# Patient Record
Sex: MALE | Race: WHITE | NOT HISPANIC OR LATINO | Employment: OTHER | URBAN - METROPOLITAN AREA
[De-identification: names, ages, dates, MRNs, and addresses within clinical notes are randomized per-mention and may not be internally consistent; named-entity substitution may affect disease eponyms.]

---

## 2017-04-03 DIAGNOSIS — F41.9 ANXIETY: ICD-10-CM

## 2017-04-03 RX ORDER — ALPRAZOLAM 0.5 MG/1
TABLET ORAL
Qty: 30 TABLET | Refills: 3 | Status: SHIPPED | OUTPATIENT
Start: 2017-04-03 | End: 2017-08-11 | Stop reason: SDUPTHER

## 2017-08-11 DIAGNOSIS — F41.9 ANXIETY: ICD-10-CM

## 2017-08-11 DIAGNOSIS — F33.1 MAJOR DEPRESSIVE DISORDER, RECURRENT, MODERATE: ICD-10-CM

## 2017-08-11 RX ORDER — ALPRAZOLAM 0.5 MG/1
0.5 TABLET ORAL NIGHTLY PRN
Qty: 30 TABLET | Refills: 5 | Status: SHIPPED | OUTPATIENT
Start: 2017-08-11 | End: 2018-03-10 | Stop reason: SDUPTHER

## 2017-08-11 RX ORDER — SERTRALINE HYDROCHLORIDE 100 MG/1
TABLET, FILM COATED ORAL
Qty: 90 TABLET | Refills: 3 | Status: SHIPPED | OUTPATIENT
Start: 2017-08-11 | End: 2018-02-23 | Stop reason: SDUPTHER

## 2017-12-28 RX ORDER — ATORVASTATIN CALCIUM 40 MG/1
TABLET, FILM COATED ORAL
Qty: 90 TABLET | Refills: 3 | Status: SHIPPED | OUTPATIENT
Start: 2017-12-28 | End: 2018-02-23 | Stop reason: SDUPTHER

## 2018-01-10 ENCOUNTER — OFFICE VISIT (OUTPATIENT)
Dept: FAMILY MEDICINE | Facility: CLINIC | Age: 63
End: 2018-01-10
Payer: COMMERCIAL

## 2018-01-10 VITALS
HEIGHT: 71 IN | TEMPERATURE: 98 F | DIASTOLIC BLOOD PRESSURE: 72 MMHG | SYSTOLIC BLOOD PRESSURE: 130 MMHG | OXYGEN SATURATION: 97 % | BODY MASS INDEX: 31.42 KG/M2 | HEART RATE: 73 BPM | WEIGHT: 224.44 LBS

## 2018-01-10 DIAGNOSIS — R73.03 PREDIABETES: Primary | ICD-10-CM

## 2018-01-10 DIAGNOSIS — G47.33 OSA ON CPAP: ICD-10-CM

## 2018-01-10 DIAGNOSIS — Z71.6 ENCOUNTER FOR SMOKING CESSATION COUNSELING: ICD-10-CM

## 2018-01-10 DIAGNOSIS — Z23 NEED FOR IMMUNIZATION AGAINST INFLUENZA: ICD-10-CM

## 2018-01-10 DIAGNOSIS — F33.1 MAJOR DEPRESSIVE DISORDER, RECURRENT, MODERATE: ICD-10-CM

## 2018-01-10 DIAGNOSIS — Z79.899 MEDICATION MANAGEMENT: ICD-10-CM

## 2018-01-10 DIAGNOSIS — F41.9 ANXIETY: ICD-10-CM

## 2018-01-10 DIAGNOSIS — N52.9 VASCULOGENIC ERECTILE DYSFUNCTION, UNSPECIFIED VASCULOGENIC ERECTILE DYSFUNCTION TYPE: ICD-10-CM

## 2018-01-10 DIAGNOSIS — Z12.5 ENCOUNTER FOR SCREENING FOR MALIGNANT NEOPLASM OF PROSTATE: ICD-10-CM

## 2018-01-10 DIAGNOSIS — E78.2 COMBINED HYPERLIPIDEMIA: ICD-10-CM

## 2018-01-10 DIAGNOSIS — Z23 NEED FOR SHINGLES VACCINE: ICD-10-CM

## 2018-01-10 DIAGNOSIS — E66.9 OBESITY (BMI 30.0-34.9): ICD-10-CM

## 2018-01-10 DIAGNOSIS — Z79.82 LONG-TERM USE OF ASPIRIN THERAPY: ICD-10-CM

## 2018-01-10 PROCEDURE — 99213 OFFICE O/P EST LOW 20 MIN: CPT | Mod: S$GLB,,, | Performed by: FAMILY MEDICINE

## 2018-01-10 PROCEDURE — 99999 PR PBB SHADOW E&M-EST. PATIENT-LVL V: CPT | Mod: PBBFAC,,, | Performed by: FAMILY MEDICINE

## 2018-01-10 RX ORDER — SILDENAFIL 100 MG/1
100 TABLET, FILM COATED ORAL DAILY PRN
Qty: 10 TABLET | Refills: 11 | Status: SHIPPED | OUTPATIENT
Start: 2018-01-10 | End: 2020-02-21

## 2018-01-10 NOTE — PATIENT INSTRUCTIONS
Consider options for smoking cessation and will discuss at upcoming annual-- chantix, vs E-cig. Re-address colonoscopy order at upcoming visit.  Obtain flu shot and shingles shot have pharmacy as advised.  Follow up for eye exam with Dr. Mcmahon per referral.

## 2018-01-10 NOTE — PROGRESS NOTES
Office Visit    Patient Name: Stef Pool    : 1955  MRN: [id number]    Subjective:  Stef is a 62 y.o. male who presents today for:    Medication Management (wants to discuss medications and get lab work done on friday)    Stef presents today for medication management and monitoring of chronic conditions including obesity, prediabetes, hyperlipidemia, tobacco abuse, anxiety and depression.  He was originally scheduled today for an annual wellness visit, but he had multiple questions regarding his medications that he wanted to address and stated that he would prefer to have labs done prior to his annual exam so we can go over them.    He has been feeling over all well. General mood outlook is good-- helped with daily zoloft. Anxiety is managed with once daily xanax.      Diet: has decreased fried foods, soft drinks, coffee      Exercise: active at work and walking pretty regularly-- feels well with exertion.      Sleep: good-- compliant with CPAP nightly     Weight is overall stable.     Smoking is poor-- he is smoking about 1 pack per day.  He was previously successful with smoking cessation on Chantix.  He has also had benefit from E cigarettes with cutting back.    Screening tests: colonoscopy (2012) up to date and next in 2017--overdue and ordered, due for PSA, hep C neg 2015     Immunizations: TDaP 2013, due for yearly FLU shot & shingles vaccine through pharmacy today    Eye/Dental: due for eye-- requests referral.          Past Medical History  Past Medical History:   Diagnosis Date    Anxiety 2015    Combined hyperlipidemia 2012    HEARING LOSS     Major depressive disorder, recurrent, moderate 2012    Obesity (BMI 30.0-34.9) 2015    diet and exercise changes discussed     BARAK on CPAP 2015    uses at least 4 hours nightly     Smoker 2013       Past Surgical History  No past surgical history on file.    Family History  Family History   Problem  "Relation Age of Onset    Heart disease Mother        Social History  Social History     Social History    Marital status:      Spouse name: N/A    Number of children: N/A    Years of education: N/A     Occupational History    Not on file.     Social History Main Topics    Smoking status: Current Every Day Smoker    Smokeless tobacco: Not on file    Alcohol use Yes    Drug use: No    Sexual activity: Not on file     Other Topics Concern    Not on file     Social History Narrative    No narrative on file       Current Medications  Medications reviewed and updated.     Allergies   Review of patient's allergies indicates:  No Known Allergies    Review of Systems (Pertinent positives)  Review of Systems   Constitutional: Negative for unexpected weight change.   Cardiovascular: Negative for chest pain and leg swelling.   Genitourinary:        Erectile dysfunction   Psychiatric/Behavioral: Positive for dysphoric mood. Negative for sleep disturbance. The patient is nervous/anxious.        /72 (BP Location: Left arm, Patient Position: Sitting)   Pulse 73   Temp 97.7 °F (36.5 °C) (Oral)   Ht 5' 11" (1.803 m)   Wt 101.8 kg (224 lb 6.9 oz)   SpO2 97%   BMI 31.30 kg/m²     Physical Exam   Constitutional: He is oriented to person, place, and time. He appears well-developed and well-nourished. No distress.   HENT:   Head: Normocephalic and atraumatic.   Cardiovascular: Normal rate, regular rhythm and normal heart sounds.    Pulmonary/Chest: Effort normal and breath sounds normal.   Musculoskeletal: He exhibits no edema.   Neurological: He is alert and oriented to person, place, and time.   Psychiatric: He has a normal mood and affect.   Vitals reviewed.        Assessment/Plan:  Stef Pool is a 62 y.o. male who presents today for :    Stef was seen today for medication management.    Diagnoses and all orders for this visit:    Prediabetes  -     Hemoglobin A1c; Future  -     Comprehensive " metabolic panel; Future  -     Lipid panel; Future  -     Ambulatory referral to Optometry    Obesity (BMI 30.0-34.9)  -     Hemoglobin A1c; Future  -     Comprehensive metabolic panel; Future  -     Lipid panel; Future  -     TSH; Future    BARAK on CPAP    Medication management  -     Hemoglobin A1c; Future  -     Comprehensive metabolic panel; Future  -     Lipid panel; Future  -     PSA, Screening; Future  -     CBC auto differential; Future  -     TSH; Future    Anxiety  -     TSH; Future    Major depressive disorder, recurrent, moderate  -     TSH; Future    Combined hyperlipidemia  -     Comprehensive metabolic panel; Future  -     Lipid panel; Future  -     CBC auto differential; Future  -     TSH; Future    Long-term use of aspirin therapy    Need for shingles vaccine    Need for immunization against influenza    Encounter for screening for malignant neoplasm of prostate  -     PSA, Screening; Future    Encounter for smoking cessation counseling    Vasculogenic erectile dysfunction, unspecified vasculogenic erectile dysfunction type  -     sildenafil (VIAGRA) 100 MG tablet; Take 1 tablet (100 mg total) by mouth daily as needed for Erectile Dysfunction.    Other orders  -     Cancel: Case request GI: COLONOSCOPY            ICD-10-CM ICD-9-CM    1. Prediabetes R73.03 790.29 Hemoglobin A1c      Comprehensive metabolic panel      Lipid panel      Ambulatory referral to Optometry   2. Obesity (BMI 30.0-34.9) E66.9 278.00 Hemoglobin A1c      Comprehensive metabolic panel      Lipid panel      TSH   3. BARAK on CPAP G47.33 327.23     Z99.89 V46.8    4. Medication management Z79.899 V58.69 Hemoglobin A1c      Comprehensive metabolic panel      Lipid panel      PSA, Screening      CBC auto differential      TSH   5. Anxiety F41.9 300.00 TSH   6. Major depressive disorder, recurrent, moderate F33.1 296.32 TSH   7. Combined hyperlipidemia E78.2 272.2 Comprehensive metabolic panel      Lipid panel      CBC auto  differential      TSH   8. Long-term use of aspirin therapy Z79.82 V58.66    9. Need for shingles vaccine Z23 V04.89    10. Need for immunization against influenza Z23 V04.81    11. Encounter for screening for malignant neoplasm of prostate Z12.5 V76.44 PSA, Screening   12. Encounter for smoking cessation counseling Z71.6 V65.42     Z72.0 305.1    13. Vasculogenic erectile dysfunction, unspecified vasculogenic erectile dysfunction type N52.9 607.84 sildenafil (VIAGRA) 100 MG tablet       Patient Instructions   Consider options for smoking cessation and will discuss at upcoming annual-- chantix, vs E-cig. Re-address colonoscopy order at upcoming visit.  Obtain flu shot and shingles shot have pharmacy as advised.  Follow up for eye exam with Dr. Mcmahon per referral.            Return for have labs as scheduled and then return for annual exam.

## 2018-01-12 ENCOUNTER — LAB VISIT (OUTPATIENT)
Dept: LAB | Facility: HOSPITAL | Age: 63
End: 2018-01-12
Attending: FAMILY MEDICINE
Payer: COMMERCIAL

## 2018-01-12 DIAGNOSIS — E78.2 COMBINED HYPERLIPIDEMIA: ICD-10-CM

## 2018-01-12 DIAGNOSIS — R73.03 PREDIABETES: ICD-10-CM

## 2018-01-12 DIAGNOSIS — F33.1 MAJOR DEPRESSIVE DISORDER, RECURRENT, MODERATE: ICD-10-CM

## 2018-01-12 DIAGNOSIS — Z79.899 MEDICATION MANAGEMENT: ICD-10-CM

## 2018-01-12 DIAGNOSIS — E66.9 OBESITY (BMI 30.0-34.9): ICD-10-CM

## 2018-01-12 DIAGNOSIS — F41.9 ANXIETY: ICD-10-CM

## 2018-01-12 DIAGNOSIS — Z12.5 ENCOUNTER FOR SCREENING FOR MALIGNANT NEOPLASM OF PROSTATE: ICD-10-CM

## 2018-01-12 LAB
ALBUMIN SERPL BCP-MCNC: 3.6 G/DL
ALP SERPL-CCNC: 113 U/L
ALT SERPL W/O P-5'-P-CCNC: 21 U/L
ANION GAP SERPL CALC-SCNC: 7 MMOL/L
AST SERPL-CCNC: 18 U/L
BASOPHILS # BLD AUTO: 0.02 K/UL
BASOPHILS NFR BLD: 0.3 %
BILIRUB SERPL-MCNC: 0.5 MG/DL
BUN SERPL-MCNC: 13 MG/DL
CALCIUM SERPL-MCNC: 9.5 MG/DL
CHLORIDE SERPL-SCNC: 106 MMOL/L
CHOLEST SERPL-MCNC: 156 MG/DL
CHOLEST/HDLC SERPL: 5.4 {RATIO}
CO2 SERPL-SCNC: 28 MMOL/L
COMPLEXED PSA SERPL-MCNC: 1.6 NG/ML
CREAT SERPL-MCNC: 1 MG/DL
DIFFERENTIAL METHOD: NORMAL
EOSINOPHIL # BLD AUTO: 0.3 K/UL
EOSINOPHIL NFR BLD: 4.3 %
ERYTHROCYTE [DISTWIDTH] IN BLOOD BY AUTOMATED COUNT: 14 %
EST. GFR  (AFRICAN AMERICAN): >60 ML/MIN/1.73 M^2
EST. GFR  (NON AFRICAN AMERICAN): >60 ML/MIN/1.73 M^2
ESTIMATED AVG GLUCOSE: 111 MG/DL
GLUCOSE SERPL-MCNC: 91 MG/DL
HBA1C MFR BLD HPLC: 5.5 %
HCT VFR BLD AUTO: 49.4 %
HDLC SERPL-MCNC: 29 MG/DL
HDLC SERPL: 18.6 %
HGB BLD-MCNC: 16.2 G/DL
LDLC SERPL CALC-MCNC: 89 MG/DL
LYMPHOCYTES # BLD AUTO: 1.7 K/UL
LYMPHOCYTES NFR BLD: 22.6 %
MCH RBC QN AUTO: 29.4 PG
MCHC RBC AUTO-ENTMCNC: 32.8 G/DL
MCV RBC AUTO: 90 FL
MONOCYTES # BLD AUTO: 0.8 K/UL
MONOCYTES NFR BLD: 10.3 %
NEUTROPHILS # BLD AUTO: 4.7 K/UL
NEUTROPHILS NFR BLD: 62.2 %
NONHDLC SERPL-MCNC: 127 MG/DL
PLATELET # BLD AUTO: 160 K/UL
PMV BLD AUTO: 11.1 FL
POTASSIUM SERPL-SCNC: 4.4 MMOL/L
PROT SERPL-MCNC: 6.7 G/DL
RBC # BLD AUTO: 5.51 M/UL
SODIUM SERPL-SCNC: 141 MMOL/L
TRIGL SERPL-MCNC: 190 MG/DL
TSH SERPL DL<=0.005 MIU/L-ACNC: 1.51 UIU/ML
WBC # BLD AUTO: 7.6 K/UL

## 2018-01-12 PROCEDURE — 84443 ASSAY THYROID STIM HORMONE: CPT

## 2018-01-12 PROCEDURE — 84153 ASSAY OF PSA TOTAL: CPT

## 2018-01-12 PROCEDURE — 83036 HEMOGLOBIN GLYCOSYLATED A1C: CPT

## 2018-01-12 PROCEDURE — 36415 COLL VENOUS BLD VENIPUNCTURE: CPT

## 2018-01-12 PROCEDURE — 80053 COMPREHEN METABOLIC PANEL: CPT

## 2018-01-12 PROCEDURE — 85025 COMPLETE CBC W/AUTO DIFF WBC: CPT

## 2018-01-12 PROCEDURE — 80061 LIPID PANEL: CPT

## 2018-01-25 ENCOUNTER — OFFICE VISIT (OUTPATIENT)
Dept: DERMATOLOGY | Facility: CLINIC | Age: 63
End: 2018-01-25
Payer: COMMERCIAL

## 2018-01-25 DIAGNOSIS — L72.0 EIC (EPIDERMAL INCLUSION CYST): ICD-10-CM

## 2018-01-25 DIAGNOSIS — Z12.83 SCREENING EXAM FOR SKIN CANCER: ICD-10-CM

## 2018-01-25 DIAGNOSIS — L81.4 LENTIGO: ICD-10-CM

## 2018-01-25 DIAGNOSIS — L91.8 SKIN TAG: ICD-10-CM

## 2018-01-25 DIAGNOSIS — D22.9 BENIGN NEVUS: ICD-10-CM

## 2018-01-25 DIAGNOSIS — L82.0 BENIGN LICHENOID KERATOSIS: Primary | ICD-10-CM

## 2018-01-25 PROCEDURE — 17110 DESTRUCTION B9 LES UP TO 14: CPT | Mod: S$GLB,,, | Performed by: DERMATOLOGY

## 2018-01-25 PROCEDURE — 99202 OFFICE O/P NEW SF 15 MIN: CPT | Mod: 25,S$GLB,, | Performed by: DERMATOLOGY

## 2018-01-25 PROCEDURE — 99999 PR PBB SHADOW E&M-EST. PATIENT-LVL II: CPT | Mod: PBBFAC,,, | Performed by: DERMATOLOGY

## 2018-01-25 NOTE — PATIENT INSTRUCTIONS

## 2018-01-25 NOTE — PROGRESS NOTES
Subjective:       Patient ID:  Stef Pool is a 62 y.o. male who presents for   Chief Complaint   Patient presents with    Spot     left chin x 12- years, asymptomatic no prev tx     Skin Check     UBSE     Patient presents as new patient for UBSE.   Lesions of concern: pink spot on the left chin, present 1-2 years, asymptomatic. No prior tx.   No personal or family history of skin cancer.        Review of Systems   Constitutional: Negative for fever and chills.   Respiratory:        Smokes 1ppd   Skin: Positive for wears hat. Negative for daily sunscreen use, activity-related sunscreen use and recent sunburn.   Hematologic/Lymphatic: Does not bruise/bleed easily.        Objective:    Physical Exam   Constitutional: He appears well-developed and well-nourished. No distress.   Neurological: He is alert and oriented to person, place, and time. He is not disoriented.   Psychiatric: He has a normal mood and affect.   Skin:   Areas Examined (abnormalities noted in diagram):   Head / Face Inspection Performed  Neck Inspection Performed  Chest / Axilla Inspection Performed  Back Inspection Performed  RUE Inspected  LUE Inspection Performed                   Diagram Legend     Erythematous scaling macule/papule c/w actinic keratosis       Vascular papule c/w angioma      Pigmented verrucoid papule/plaque c/w seborrheic keratosis      Yellow umbilicated papule c/w sebaceous hyperplasia      Irregularly shaped tan macule c/w lentigo     1-2 mm smooth white papules consistent with Milia      Movable subcutaneous cyst with punctum c/w epidermal inclusion cyst      Subcutaneous movable cyst c/w pilar cyst      Firm pink to brown papule c/w dermatofibroma      Pedunculated fleshy papule(s) c/w skin tag(s)      Evenly pigmented macule c/w junctional nevus     Mildly variegated pigmented, slightly irregular-bordered macule c/w mildly atypical nevus      Flesh colored to evenly pigmented papule c/w intradermal nevus        Pink pearly papule/plaque c/w basal cell carcinoma      Erythematous hyperkeratotic cursted plaque c/w SCC      Surgical scar with no sign of skin cancer recurrence      Open and closed comedones      Inflammatory papules and pustules      Verrucoid papule consistent consistent with wart     Erythematous eczematous patches and plaques     Dystrophic onycholytic nail with subungual debris c/w onychomycosis     Umbilicated papule    Erythematous-base heme-crusted tan verrucoid plaque consistent with inflamed seborrheic keratosis     Erythematous Silvery Scaling Plaque c/w Psoriasis     See annotation      Assessment / Plan:        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 Liquid nitrogen cryosurgery is applied to 1 lesions to produce a freeze injury. The patient is aware that blisters may form and is instructed on wound care with gentle cleansing and use of vaseline ointment to keep moist until healed. The patient is supplied a handout on cryosurgery and is instructed to call if lesions do not completely resolve.      Lentigo  This is a benign hyperpigmented sun induced lesion. Daily sun protection will reduce the number of new lesions. Treatment of these benign lesions are considered cosmetic.      Skin tags  Reassurance given to patient. No treatment is necessary.   Pt to see ophtho tomorrow for removal of irritating lesions left lower and upper eyelid    Benign nevus  Reassurance given to patient. No treatment is necessary.       EIC (epidermal inclusion cyst)  Reassurance given to patient. No treatment is necessary.       Screening exam for skin cancer    Upper body skin examination performed today including at least 6 points as noted in physical examination. No lesions suspicious for malignancy noted.               Follow-up if symptoms worsen or fail to improve.

## 2018-01-26 ENCOUNTER — OFFICE VISIT (OUTPATIENT)
Dept: OPTOMETRY | Facility: CLINIC | Age: 63
End: 2018-01-26
Payer: COMMERCIAL

## 2018-01-26 DIAGNOSIS — H25.13 NUCLEAR SCLEROSIS OF BOTH EYES: Primary | ICD-10-CM

## 2018-01-26 DIAGNOSIS — H52.4 BILATERAL PRESBYOPIA: ICD-10-CM

## 2018-01-26 DIAGNOSIS — H02.9 EYELID LESION: ICD-10-CM

## 2018-01-26 PROCEDURE — 92004 COMPRE OPH EXAM NEW PT 1/>: CPT | Mod: S$GLB,,, | Performed by: OPTOMETRIST

## 2018-01-26 PROCEDURE — 99999 PR PBB SHADOW E&M-EST. PATIENT-LVL II: CPT | Mod: PBBFAC,,, | Performed by: OPTOMETRIST

## 2018-01-26 NOTE — LETTER
January 26, 2018      Caitlyn Fong MD  [address]  [address]  [place] [address]           [place] [facility]  [address] [facility]  [place] [address]  Phone: [phone]  Fax: [phone]          Patient: Stef Pool   MR Number: [id number]   YOB: 1955   Date of Visit: 1/26/2018       Dear Dr. Caitlyn Fong:    Thank you for referring Stef Pool to me for evaluation. Attached you will find relevant portions of my assessment and plan of care.    If you have questions, please do not hesitate to call me. I look forward to following Stef Pool along with you.    Sincerely,    Don Mcmahon, OD    Enclosure  CC:  No Recipients    If you would like to receive this communication electronically, please contact [email or web address] or [phone] to request more information on [employer] access.    For providers and/or their staff who would like to refer a patient to [facility], please contact us through our one-stop-shop provider referral line, [facility] , at [phone].    If you feel you have received this communication in error or would no longer like to receive these types of communications, please e-mail [email or web address]

## 2018-01-26 NOTE — PROGRESS NOTES
HPI     Using OTC readers with good results  Wants eyelid lesion removed from LLL  Found out he was not diabetic    Last edited by Don Mcmahon, OD on 1/26/2018 10:57 AM. (History)            Assessment /Plan     For exam results, see Encounter Report.    Nuclear sclerosis of both eyes    Eyelid lesion  -     Ambulatory Referral to Ophthalmology    Bilateral presbyopia      1. Educated pt on presence of cataracts and effects on vision. No surgery at this time. Recheck in one year.  2. Refer to [facility] for eval and removal.  3. Cont with OTc readers.

## 2018-01-26 NOTE — MEDICAL/APP STUDENT
Subjective:       Patient ID: Stef Pool is a 62 y.o. male.    Chief Complaint: Skin tag on OS LL is a cosmetic concern and has gotten large enough to be within line of sight. Uses +2.50 OTC readers, happy with distance vision, reports difficulty driving at night due to glare.    HPI  Review of Systems    Objective:      Physical Exam    Assessment:       No diagnosis found.    Plan:

## 2018-02-23 ENCOUNTER — OFFICE VISIT (OUTPATIENT)
Dept: FAMILY MEDICINE | Facility: CLINIC | Age: 63
End: 2018-02-23
Payer: COMMERCIAL

## 2018-02-23 VITALS
SYSTOLIC BLOOD PRESSURE: 123 MMHG | BODY MASS INDEX: 31.11 KG/M2 | TEMPERATURE: 98 F | DIASTOLIC BLOOD PRESSURE: 71 MMHG | HEIGHT: 71 IN | OXYGEN SATURATION: 97 % | HEART RATE: 63 BPM | WEIGHT: 222.25 LBS

## 2018-02-23 DIAGNOSIS — M76.32 IT BAND SYNDROME, LEFT: ICD-10-CM

## 2018-02-23 DIAGNOSIS — Z12.11 COLON CANCER SCREENING: ICD-10-CM

## 2018-02-23 DIAGNOSIS — Z79.899 MEDICATION MANAGEMENT: ICD-10-CM

## 2018-02-23 DIAGNOSIS — E78.2 COMBINED HYPERLIPIDEMIA: ICD-10-CM

## 2018-02-23 DIAGNOSIS — R73.03 PREDIABETES: ICD-10-CM

## 2018-02-23 DIAGNOSIS — Z00.00 ROUTINE GENERAL MEDICAL EXAMINATION AT A HEALTH CARE FACILITY: Primary | ICD-10-CM

## 2018-02-23 DIAGNOSIS — H25.13 NUCLEAR SCLEROSIS, BILATERAL: ICD-10-CM

## 2018-02-23 DIAGNOSIS — G47.33 OSA ON CPAP: ICD-10-CM

## 2018-02-23 DIAGNOSIS — F33.1 MAJOR DEPRESSIVE DISORDER, RECURRENT, MODERATE: ICD-10-CM

## 2018-02-23 DIAGNOSIS — N40.1 BENIGN PROSTATIC HYPERPLASIA WITH WEAK URINARY STREAM: ICD-10-CM

## 2018-02-23 DIAGNOSIS — R39.12 BENIGN PROSTATIC HYPERPLASIA WITH WEAK URINARY STREAM: ICD-10-CM

## 2018-02-23 DIAGNOSIS — F41.9 ANXIETY: ICD-10-CM

## 2018-02-23 DIAGNOSIS — Z71.6 ENCOUNTER FOR SMOKING CESSATION COUNSELING: ICD-10-CM

## 2018-02-23 DIAGNOSIS — Z79.82 LONG-TERM USE OF ASPIRIN THERAPY: ICD-10-CM

## 2018-02-23 DIAGNOSIS — E66.9 OBESITY (BMI 30.0-34.9): ICD-10-CM

## 2018-02-23 PROCEDURE — 99396 PREV VISIT EST AGE 40-64: CPT | Mod: S$GLB,,, | Performed by: FAMILY MEDICINE

## 2018-02-23 PROCEDURE — 99999 PR PBB SHADOW E&M-EST. PATIENT-LVL III: CPT | Mod: PBBFAC,,, | Performed by: FAMILY MEDICINE

## 2018-02-23 RX ORDER — METFORMIN HYDROCHLORIDE 500 MG/1
500 TABLET, EXTENDED RELEASE ORAL
Qty: 90 TABLET | Refills: 3 | Status: SHIPPED | OUTPATIENT
Start: 2018-02-23 | End: 2019-02-08 | Stop reason: SDUPTHER

## 2018-02-23 RX ORDER — SERTRALINE HYDROCHLORIDE 100 MG/1
100 TABLET, FILM COATED ORAL DAILY
Qty: 90 TABLET | Refills: 3 | Status: SHIPPED | OUTPATIENT
Start: 2018-02-23 | End: 2019-02-08 | Stop reason: SDUPTHER

## 2018-02-23 RX ORDER — ATORVASTATIN CALCIUM 40 MG/1
40 TABLET, FILM COATED ORAL DAILY
Qty: 90 TABLET | Refills: 3 | Status: SHIPPED | OUTPATIENT
Start: 2018-02-23 | End: 2019-02-08 | Stop reason: SDUPTHER

## 2018-02-23 RX ORDER — VARENICLINE TARTRATE 0.5 (11)-1
KIT ORAL
Qty: 1 PACKAGE | Refills: 0 | Status: SHIPPED | OUTPATIENT
Start: 2018-02-23 | End: 2020-02-21

## 2018-02-23 RX ORDER — VARENICLINE TARTRATE 1 MG/1
1 TABLET, FILM COATED ORAL 2 TIMES DAILY
Qty: 60 TABLET | Refills: 4 | Status: SHIPPED | OUTPATIENT
Start: 2018-02-23 | End: 2020-02-21

## 2018-02-23 NOTE — PROGRESS NOTES
Office Visit    Patient Name: Stef Pool    : 1955  MRN: [id number]    Subjective:  Stef is a 62 y.o. male who presents today for:    Annual Exam    63 yo male here for annual exam and monitoring of chronic conditions including obesity, prediabetes, hyperlipidemia (with ongoing triglyceride elevation and low HDL), tobacco abuse, anxiety and depression.     He has been feeling over all well. General mood outlook is good-- helped with daily zoloft. Anxiety is managed with once daily xanax.      Diet: has decreased fried foods, soft drinks, coffee and drinking more water.       Exercise: walking 3-4 times week 1-2 miles, walks dogs      Sleep: good-- compliant with CPAP nightly, about 8 hours nightly     Weight is overall stable.     Today he complains of some BPH type symptoms-- frequency, weak stream of urine, trouble emptying bladder.      Smoking is poor-- he is smoking about 1 pack per day.  He was previously successful with smoking cessation on Chantix.  He an his wife are ready to try Chantix for smoking cessation. His wife recently picked up a Chantix prescription. High readiness to quit and plans to start chantix with his wife. Plans to use vaporized nicotine and decrease gradually until full cessation.      Screening tests: colonoscopy (2012) up to date and next in 2017--overdue and ordered, PSA normal, hep C neg 2015     Immunizations: TDaP 2013, FLU shot UTD & shingles vaccine through pharmacy 1/10/2018, Pneumovax 23 2012     Eye/Dental: eye [place] 2018- RTC one year, dental       Past Medical History  Past Medical History:   Diagnosis Date    Anxiety 2015    Combined hyperlipidemia 2012    HEARING LOSS     Major depressive disorder, recurrent, moderate 2012    Obesity (BMI 30.0-34.9) 2015    diet and exercise changes discussed     BARAK on CPAP 2015    uses at least 4 hours nightly     Smoker 2013       Past Surgical  "History  History reviewed. No pertinent surgical history.    Family History  Family History   Problem Relation Age of Onset    Heart disease Mother        Social History  Social History     Social History    Marital status:      Spouse name: N/A    Number of children: N/A    Years of education: N/A     Occupational History    Not on file.     Social History Main Topics    Smoking status: Current Every Day Smoker    Smokeless tobacco: Never Used    Alcohol use Yes    Drug use: No    Sexual activity: Not on file     Other Topics Concern    Not on file     Social History Narrative    No narrative on file       Current Medications  Medications reviewed and updated.     Allergies   Review of patient's allergies indicates:  No Known Allergies    Review of Systems (Pertinent positives)  Review of Systems   Constitutional: Negative for unexpected weight change.   Eyes: Negative for visual disturbance.   Respiratory: Negative for shortness of breath.    Cardiovascular: Negative for chest pain.   Gastrointestinal: Negative for constipation and diarrhea.   Genitourinary: Positive for difficulty urinating and frequency.   Musculoskeletal: Positive for arthralgias (left hip).   Skin: Positive for wound.   Allergic/Immunologic: Negative for environmental allergies.   Psychiatric/Behavioral: Negative for dysphoric mood and sleep disturbance. The patient is not nervous/anxious.        /71 (BP Location: Left arm, Patient Position: Sitting)   Pulse 63   Temp 98.2 °F (36.8 °C) (Oral)   Ht 5' 11" (1.803 m)   Wt 100.8 kg (222 lb 3.6 oz)   SpO2 97%   BMI 30.99 kg/m²     Physical Exam   Constitutional: He is oriented to person, place, and time. He appears well-developed and well-nourished. No distress.   HENT:   Head: Normocephalic and atraumatic.   Right Ear: External ear normal.   Left Ear: External ear normal.   Nose: Nose normal.   Mouth/Throat: Oropharynx is clear and moist. No oropharyngeal exudate. "   Eyes: Conjunctivae are normal. No scleral icterus.   Neck: No tracheal deviation present. No thyromegaly present.   Cardiovascular: Normal rate, regular rhythm, normal heart sounds and intact distal pulses.    Pulmonary/Chest: Effort normal and breath sounds normal.   Abdominal: Soft. Bowel sounds are normal. He exhibits no distension and no mass. There is no tenderness. No hernia.   Genitourinary: Prostate normal and penis normal.   Musculoskeletal: Normal range of motion.        Legs:  Lymphadenopathy:     He has no cervical adenopathy.   Neurological: He is alert and oriented to person, place, and time. He has normal reflexes.   Skin: Skin is warm and dry. No rash noted.   Psychiatric: He has a normal mood and affect.   Vitals reviewed.        Assessment/Plan:  Stef Pool is a 62 y.o. male who presents today for :    Stef was seen today for annual exam.    Diagnoses and all orders for this visit:    Routine general medical examination at a health care facility  Comments:  health maintenance reviewed and up to date except for colonoscopy-- ordered  Orders:  -     Comprehensive metabolic panel; Future  -     Lipid panel; Future  -     Hemoglobin A1c; Future    Obesity (BMI 30.0-34.9)    Encounter for smoking cessation counseling  -     varenicline (CHANTIX STARTING MONTH CYDNEY) 0.5 mg (11)- 1 mg (42) tablet; Take one 0.5mg tab by mouth once daily X3 days,then increase to one 0.5mg tab twice daily X4 days,then increase to one 1mg tab twice daily  -     varenicline (CHANTIX) 1 mg Tab; Take 1 tablet (1 mg total) by mouth 2 (two) times daily.    Prediabetes  -     Comprehensive metabolic panel; Future  -     Lipid panel; Future  -     Hemoglobin A1c; Future  -     metFORMIN (GLUCOPHAGE-XR) 500 MG 24 hr tablet; Take 1 tablet (500 mg total) by mouth daily with breakfast.    BARAK on CPAP    Major depressive disorder, recurrent, moderate  -     sertraline (ZOLOFT) 100 MG tablet; Take 1 tablet (100 mg total) by mouth  "once daily.    Combined hyperlipidemia  -     Comprehensive metabolic panel; Future  -     Lipid panel; Future  -     atorvastatin (LIPITOR) 40 MG tablet; Take 1 tablet (40 mg total) by mouth once daily.    Medication management  -     Comprehensive metabolic panel; Future  -     Lipid panel; Future  -     Hemoglobin A1c; Future    Long-term use of aspirin therapy    Anxiety    Nuclear sclerosis, bilateral    Colon cancer screening  -     Case request GI: COLONOSCOPY    Benign prostatic hyperplasia with weak urinary stream    Prediabetes  Comments:  significant improvement on Metformin and with lifestyle changes  Orders:  -     Comprehensive metabolic panel; Future  -     Lipid panel; Future  -     Hemoglobin A1c; Future  -     metFORMIN (GLUCOPHAGE-XR) 500 MG 24 hr tablet; Take 1 tablet (500 mg total) by mouth daily with breakfast.    Major depressive disorder, recurrent, moderate  Comments:  controled with zoloft  Orders:  -     sertraline (ZOLOFT) 100 MG tablet; Take 1 tablet (100 mg total) by mouth once daily.    It band syndrome, left  Comments:  demonstrated "towel stretches"             ICD-10-CM ICD-9-CM    1. Routine general medical examination at a health care facility Z00.00 V70.0 Comprehensive metabolic panel      Lipid panel      Hemoglobin A1c    health maintenance reviewed and up to date except for colonoscopy-- ordered   2. Obesity (BMI 30.0-34.9) E66.9 278.00    3. Encounter for smoking cessation counseling Z71.6 V65.42 varenicline (CHANTIX STARTING MONTH CYDNEY) 0.5 mg (11)- 1 mg (42) tablet    Z72.0 305.1 varenicline (CHANTIX) 1 mg Tab   4. Prediabetes R73.03 790.29 Comprehensive metabolic panel      Lipid panel      Hemoglobin A1c      metFORMIN (GLUCOPHAGE-XR) 500 MG 24 hr tablet   5. BARAK on CPAP G47.33 327.23     Z99.89 V46.8    6. Major depressive disorder, recurrent, moderate F33.1 296.32 sertraline (ZOLOFT) 100 MG tablet   7. Combined hyperlipidemia E78.2 272.2 Comprehensive metabolic panel     " " Lipid panel      atorvastatin (LIPITOR) 40 MG tablet   8. Medication management Z79.899 V58.69 Comprehensive metabolic panel      Lipid panel      Hemoglobin A1c   9. Long-term use of aspirin therapy Z79.82 V58.66    10. Anxiety F41.9 300.00    11. Nuclear sclerosis, bilateral H25.13 366.16    12. Colon cancer screening Z12.11 V76.51 Case request GI: COLONOSCOPY   13. Benign prostatic hyperplasia with weak urinary stream N40.1 600.01     R39.12 788.62    14. Prediabetes R73.03 790.29 Comprehensive metabolic panel      Lipid panel      Hemoglobin A1c      metFORMIN (GLUCOPHAGE-XR) 500 MG 24 hr tablet    significant improvement on Metformin and with lifestyle changes   15. Major depressive disorder, recurrent, moderate F33.1 296.32 sertraline (ZOLOFT) 100 MG tablet    controled with zoloft   16. It band syndrome, left M76.32 728.89     demonstrated "towel stretches"        There are no Patient Instructions on file for this visit.      Follow-up in about 6 months (around 8/23/2018) for to follow up on lab results, return as needed for new concerns.  "

## 2018-03-05 ENCOUNTER — TELEPHONE (OUTPATIENT)
Dept: GASTROENTEROLOGY | Facility: CLINIC | Age: 63
End: 2018-03-05

## 2018-03-05 NOTE — TELEPHONE ENCOUNTER
Referral was sent from Dr. Fong to schedule patient for an Colonoscopy, patient states will call back at a later time.

## 2018-03-10 DIAGNOSIS — F41.9 ANXIETY: ICD-10-CM

## 2018-03-12 RX ORDER — ALPRAZOLAM 0.5 MG/1
TABLET ORAL
Qty: 30 TABLET | Refills: 5 | Status: SHIPPED | OUTPATIENT
Start: 2018-03-12 | End: 2018-03-13 | Stop reason: SDUPTHER

## 2018-03-13 DIAGNOSIS — F41.9 ANXIETY: ICD-10-CM

## 2018-03-14 RX ORDER — ALPRAZOLAM 0.5 MG/1
TABLET ORAL
Qty: 30 TABLET | Refills: 5 | Status: SHIPPED | OUTPATIENT
Start: 2018-03-14 | End: 2018-08-24 | Stop reason: SDUPTHER

## 2018-04-17 ENCOUNTER — INITIAL CONSULT (OUTPATIENT)
Dept: OPHTHALMOLOGY | Facility: CLINIC | Age: 63
End: 2018-04-17
Payer: COMMERCIAL

## 2018-04-17 VITALS — SYSTOLIC BLOOD PRESSURE: 133 MMHG | HEART RATE: 64 BPM | DIASTOLIC BLOOD PRESSURE: 75 MMHG

## 2018-04-17 DIAGNOSIS — H02.9 EYELID LESION: Primary | ICD-10-CM

## 2018-04-17 PROCEDURE — 99999 PR PBB SHADOW E&M-EST. PATIENT-LVL III: CPT | Mod: PBBFAC,,, | Performed by: OPHTHALMOLOGY

## 2018-04-17 PROCEDURE — 92285 EXTERNAL OCULAR PHOTOGRAPHY: CPT | Mod: S$GLB,,, | Performed by: OPHTHALMOLOGY

## 2018-04-17 PROCEDURE — 92012 INTRM OPH EXAM EST PATIENT: CPT | Mod: 25,S$GLB,, | Performed by: OPHTHALMOLOGY

## 2018-04-17 PROCEDURE — 67840 REMOVE EYELID LESION: CPT | Mod: E2,S$GLB,, | Performed by: OPHTHALMOLOGY

## 2018-04-17 PROCEDURE — 88305 TISSUE EXAM BY PATHOLOGIST: CPT | Performed by: PATHOLOGY

## 2018-04-17 PROCEDURE — 88305 TISSUE EXAM BY PATHOLOGIST: CPT | Mod: 26,,, | Performed by: PATHOLOGY

## 2018-04-17 NOTE — PROGRESS NOTES
HPI     Lesion    Additional comments: ref. by Dr Mcmahon           Comments   Pt. States he has a growth on LLL. Noticed about 1 year ago. Getting   bigger. Impairs vision       Last edited by Latricia Tobias on 4/17/2018  2:45 PM. (History)            Assessment /Plan     For exam results, see Encounter Report.    Eyelid lesion  -     External/Slit Lamp Photography      Patient with left lower eyelid lesion causing chronic irritation and will move into visual field occasionally.     Recommend excisional biopsy.     Informed consent obtained after extensive risks/benefits/alternatives were discussed with the patient including but not limited to pain, bleeding, infection, ocular injury, loss of the eye, asymmetry, need for revision in future, scarring.  Alternatives such as waiting were discussed.  All questions were answered.      Procedure Note    Attending: Caterina Peters  Resident:None  Pre-op Dx: Left lower eyelid lesion  Post-op Dx: same  Local: 2% lidocaine with epinephrine with 0.5% marcaine and 4% NaHCO3 and vitrase  Specimens:  Left lower eyelid lesion  Complications: None  Blood Loss: minimal    The patient was prepped and draped in the usual sterile manner for ophthalmic plastic surgery.  A corneal shield was placed in the left palpebral fissure. 1 cc of local anesthesia was given to the left lower eyelid.  A liane scissor was used to excise the lesion from its base. The tissue was sent to pathology.  High-temp cautery was used to obtain hemostasis. The corneal shield  was removed. Tobradex ointment was applied to the wound. The patient tolerated the procedure well. There were no complications.     Post-operative instructions were given to the patient.

## 2018-04-17 NOTE — LETTER
April 17, 2018      Don Mcmahon, OD  [address]  [place] [address]           [facility]  [address]  [facility] [address]  Phone: [phone]  Fax: [phone]          Patient: Stef Pool   MR Number: [id number]   YOB: 1955   Date of Visit: 4/17/2018       Dear Dr. Don Mcmahon:    Thank you for referring Stef Pool to me for evaluation. Attached you will find relevant portions of my assessment and plan of care.    If you have questions, please do not hesitate to call me. I look forward to following Stef Pool along with you.    Sincerely,    Caterina Peters MD    Enclosure  CC:  No Recipients    If you would like to receive this communication electronically, please contact [email or web address] or [phone] to request more information on [employer] access.    For providers and/or their staff who would like to refer a patient to [facility], please contact us through our one-stop-shop provider referral line, [facility], at [phone].    If you feel you have received this communication in error or would no longer like to receive these types of communications, please e-mail [email or web address]

## 2018-05-08 ENCOUNTER — TELEPHONE (OUTPATIENT)
Dept: OPHTHALMOLOGY | Facility: CLINIC | Age: 63
End: 2018-05-08

## 2018-08-10 ENCOUNTER — LAB VISIT (OUTPATIENT)
Dept: LAB | Facility: HOSPITAL | Age: 63
End: 2018-08-10
Attending: FAMILY MEDICINE
Payer: COMMERCIAL

## 2018-08-10 DIAGNOSIS — Z79.899 MEDICATION MANAGEMENT: ICD-10-CM

## 2018-08-10 DIAGNOSIS — Z00.00 ROUTINE GENERAL MEDICAL EXAMINATION AT A HEALTH CARE FACILITY: ICD-10-CM

## 2018-08-10 DIAGNOSIS — R73.03 PREDIABETES: ICD-10-CM

## 2018-08-10 DIAGNOSIS — E78.2 COMBINED HYPERLIPIDEMIA: ICD-10-CM

## 2018-08-10 LAB
ALBUMIN SERPL BCP-MCNC: 4 G/DL
ALP SERPL-CCNC: 127 U/L
ALT SERPL W/O P-5'-P-CCNC: 29 U/L
ANION GAP SERPL CALC-SCNC: 6 MMOL/L
AST SERPL-CCNC: 21 U/L
BILIRUB SERPL-MCNC: 0.5 MG/DL
BUN SERPL-MCNC: 17 MG/DL
CALCIUM SERPL-MCNC: 9.2 MG/DL
CHLORIDE SERPL-SCNC: 107 MMOL/L
CHOLEST SERPL-MCNC: 166 MG/DL
CHOLEST/HDLC SERPL: 5.7 {RATIO}
CO2 SERPL-SCNC: 28 MMOL/L
CREAT SERPL-MCNC: 1 MG/DL
EST. GFR  (AFRICAN AMERICAN): >60 ML/MIN/1.73 M^2
EST. GFR  (NON AFRICAN AMERICAN): >60 ML/MIN/1.73 M^2
ESTIMATED AVG GLUCOSE: 117 MG/DL
GLUCOSE SERPL-MCNC: 96 MG/DL
HBA1C MFR BLD HPLC: 5.7 %
HDLC SERPL-MCNC: 29 MG/DL
HDLC SERPL: 17.5 %
LDLC SERPL CALC-MCNC: 98.6 MG/DL
NONHDLC SERPL-MCNC: 137 MG/DL
POTASSIUM SERPL-SCNC: 4.4 MMOL/L
PROT SERPL-MCNC: 6.8 G/DL
SODIUM SERPL-SCNC: 141 MMOL/L
TRIGL SERPL-MCNC: 192 MG/DL

## 2018-08-10 PROCEDURE — 80053 COMPREHEN METABOLIC PANEL: CPT

## 2018-08-10 PROCEDURE — 83036 HEMOGLOBIN GLYCOSYLATED A1C: CPT

## 2018-08-10 PROCEDURE — 36415 COLL VENOUS BLD VENIPUNCTURE: CPT

## 2018-08-10 PROCEDURE — 80061 LIPID PANEL: CPT

## 2018-08-24 ENCOUNTER — OFFICE VISIT (OUTPATIENT)
Dept: FAMILY MEDICINE | Facility: CLINIC | Age: 63
End: 2018-08-24
Payer: COMMERCIAL

## 2018-08-24 VITALS
BODY MASS INDEX: 30.9 KG/M2 | SYSTOLIC BLOOD PRESSURE: 139 MMHG | DIASTOLIC BLOOD PRESSURE: 77 MMHG | WEIGHT: 220.69 LBS | HEIGHT: 71 IN | TEMPERATURE: 98 F | OXYGEN SATURATION: 96 % | HEART RATE: 60 BPM

## 2018-08-24 DIAGNOSIS — Z12.5 ENCOUNTER FOR PROSTATE CANCER SCREENING: ICD-10-CM

## 2018-08-24 DIAGNOSIS — Z12.11 COLON CANCER SCREENING: ICD-10-CM

## 2018-08-24 DIAGNOSIS — E78.2 COMBINED HYPERLIPIDEMIA: Primary | ICD-10-CM

## 2018-08-24 DIAGNOSIS — F17.200 SMOKER: ICD-10-CM

## 2018-08-24 DIAGNOSIS — Z79.899 MEDICATION MANAGEMENT: ICD-10-CM

## 2018-08-24 DIAGNOSIS — F33.1 MAJOR DEPRESSIVE DISORDER, RECURRENT, MODERATE: ICD-10-CM

## 2018-08-24 DIAGNOSIS — F41.9 ANXIETY: ICD-10-CM

## 2018-08-24 DIAGNOSIS — R73.03 PREDIABETES: ICD-10-CM

## 2018-08-24 DIAGNOSIS — G47.33 OSA ON CPAP: ICD-10-CM

## 2018-08-24 DIAGNOSIS — E66.9 OBESITY (BMI 30.0-34.9): ICD-10-CM

## 2018-08-24 DIAGNOSIS — Z79.82 LONG-TERM USE OF ASPIRIN THERAPY: ICD-10-CM

## 2018-08-24 PROCEDURE — 3008F BODY MASS INDEX DOCD: CPT | Mod: CPTII,S$GLB,, | Performed by: FAMILY MEDICINE

## 2018-08-24 PROCEDURE — 99999 PR PBB SHADOW E&M-EST. PATIENT-LVL IV: CPT | Mod: PBBFAC,,, | Performed by: FAMILY MEDICINE

## 2018-08-24 PROCEDURE — 99214 OFFICE O/P EST MOD 30 MIN: CPT | Mod: S$GLB,,, | Performed by: FAMILY MEDICINE

## 2018-08-24 RX ORDER — ALPRAZOLAM 0.5 MG/1
TABLET ORAL
Qty: 30 TABLET | Refills: 5 | Status: SHIPPED | OUTPATIENT
Start: 2018-08-24 | End: 2018-11-13 | Stop reason: SDUPTHER

## 2018-08-24 NOTE — PATIENT INSTRUCTIONS
OBTAIN REPEAT COLONOSCOPY-- BE PREPARED TO SCHEDULE A DATE WHEN CONTACTED.  CONTINUE TO CONSIDER SMOKING CESSATION.  CONTINUE CURRENT MEDS AS PRESCRIBED WITH REPEAT LABS IN 6 MONTHS, PRIOR TO ANNUAL.  INCORPORATE REGULAR EXERCISE AND LOWER CARB DIET. CONTACT OFFICE IF ANY ISSUES WITH OBTAINING MORE CPAP SUPPLIES-- USE CPAP NIGHTLY.

## 2018-09-28 ENCOUNTER — TELEPHONE (OUTPATIENT)
Dept: GASTROENTEROLOGY | Facility: CLINIC | Age: 63
End: 2018-09-28

## 2018-10-11 ENCOUNTER — TELEPHONE (OUTPATIENT)
Dept: GASTROENTEROLOGY | Facility: CLINIC | Age: 63
End: 2018-10-11

## 2018-11-07 ENCOUNTER — TELEPHONE (OUTPATIENT)
Dept: GASTROENTEROLOGY | Facility: CLINIC | Age: 63
End: 2018-11-07

## 2018-11-13 DIAGNOSIS — F41.9 ANXIETY: ICD-10-CM

## 2018-11-13 RX ORDER — ALPRAZOLAM 0.5 MG/1
TABLET ORAL
Qty: 30 TABLET | Refills: 5 | Status: SHIPPED | OUTPATIENT
Start: 2018-11-13 | End: 2019-05-28 | Stop reason: SDUPTHER

## 2018-11-28 ENCOUNTER — CLINICAL SUPPORT (OUTPATIENT)
Dept: SMOKING CESSATION | Facility: CLINIC | Age: 63
End: 2018-11-28
Payer: COMMERCIAL

## 2018-11-28 VITALS — OXYGEN SATURATION: 97 % | HEART RATE: 66 BPM

## 2018-11-28 DIAGNOSIS — F17.210 HEAVY SMOKER (MORE THAN 20 CIGARETTES PER DAY): Primary | ICD-10-CM

## 2018-11-28 PROCEDURE — 99404 PREV MED CNSL INDIV APPRX 60: CPT | Mod: S$GLB,,,

## 2018-11-28 RX ORDER — IBUPROFEN 200 MG
1 TABLET ORAL DAILY
Qty: 14 PATCH | Refills: 0 | Status: SHIPPED | OUTPATIENT
Start: 2018-11-28 | End: 2018-12-12

## 2018-11-28 RX ORDER — NICOTINE 7MG/24HR
1 PATCH, TRANSDERMAL 24 HOURS TRANSDERMAL DAILY
Qty: 14 PATCH | Refills: 0 | Status: SHIPPED | OUTPATIENT
Start: 2018-11-28 | End: 2019-01-02 | Stop reason: SDUPTHER

## 2018-11-28 NOTE — Clinical Note
The patient is smoking about 26 cigarettes/day and has used Chantix with success before and Wellbutrin without success. He is ready to quit but lacks confidence to quit. Has vaped before and was reducing the nicotine level . Will start with double patches of 21 and 7 mg nicotine due to high nicotine intake. The patient will continue individual sessions and medication monitoring by CTTS. Prescribed medication management will be by practitoner.

## 2018-12-17 ENCOUNTER — TELEPHONE (OUTPATIENT)
Dept: SMOKING CESSATION | Facility: CLINIC | Age: 63
End: 2018-12-17

## 2018-12-17 DIAGNOSIS — F17.210 MODERATE SMOKER (20 OR LESS PER DAY): Primary | ICD-10-CM

## 2018-12-17 RX ORDER — IBUPROFEN 200 MG
1 TABLET ORAL DAILY
Qty: 14 PATCH | Refills: 0 | Status: SHIPPED | OUTPATIENT
Start: 2018-12-17 | End: 2019-01-04

## 2018-12-18 NOTE — TELEPHONE ENCOUNTER
Missed appt. Pt. Called needs patches. He is smoking 15 cigarettes/day and is only using the 21 mg nicotine patch. States he is not having any problems with the patch and feels he is making progress. Reordered patches today 21 mg only

## 2018-12-19 ENCOUNTER — CLINICAL SUPPORT (OUTPATIENT)
Dept: SMOKING CESSATION | Facility: CLINIC | Age: 63
End: 2018-12-19
Payer: COMMERCIAL

## 2018-12-19 VITALS — HEART RATE: 55 BPM | OXYGEN SATURATION: 97 %

## 2018-12-19 DIAGNOSIS — F17.210 MODERATE SMOKER (20 OR LESS PER DAY): Primary | ICD-10-CM

## 2018-12-19 PROCEDURE — 90853 GROUP PSYCHOTHERAPY: CPT | Mod: S$GLB,,,

## 2018-12-19 NOTE — PROGRESS NOTES
Smoking Cessation Group Session #2    Site: [facility]  Date:  12/19/2018  Clinical Status of Patient: Outpatient   Length of Service and Code: 90 minutes - 21995   Number in Attendance: 3  Group Activities/Focus of Group:  Sharing last weeks challenges, triggers, and coping activities to remain quit and/ or keep making progress toward cessation, completion of TCRS (Tobacco Cessation Rating Scale) learned addiction model, personal reasons for quitting, medications, goals, quit date.    Specific session focus: completion of TCRS (Tobacco Cessation Rating Scale) reviewed strategies, cues, and triggers. Introduced the negative impact of tobacco on health, the health advantages of discontinuing the use of tobacco, time line improved health changes after a quit, withdrawal issues to expect from nicotine and habit, and ways to achieve the goal of a quit.      Target symptoms:  withdrawal and medication side effects             The following were rated moderate (3) to severe (4) on TCRS:       Moderate 3: tremor, shaky, nicotine withdrawal symptom habit     Severe 4:   Desire/crave nicotine habit   Patient's Response to Intervention: The patient is smoking 12-16 cigarettes/day using the 21 mg nicotine patch. He has cut back from 7 cigarettes with morning coffee to 3 cigarettes. concerned he could not cut back more, but explained need to stretch out th cigarettes he cuts out all through the day, not in clusters. Will focus on delay ans diversion to help him quit. The CO measurement = 25 ppm which indicates a heavy smoker. Will cut back 2-3 cigarettes.day.   Progress Toward Goals and Other Mental Status Changes: The patient denies any abnormal behavioral or mental changes at this time.    Interval History: 0    Diagnosis: F17.210  Plan: The patient will continue with group therapy sessions and medication regimen prescribed with management by physician or by the Cessation Clinic Provider. Patient will inform Smoking Cessation  Counselor of symptoms as rated high on TCRS.    Return to Clinic: 3 weeks    Quit Date: TBD   Planned Quit Date: TBD

## 2019-01-02 ENCOUNTER — CLINICAL SUPPORT (OUTPATIENT)
Dept: SMOKING CESSATION | Facility: CLINIC | Age: 64
End: 2019-01-02
Payer: COMMERCIAL

## 2019-01-02 VITALS — HEART RATE: 73 BPM | OXYGEN SATURATION: 98 %

## 2019-01-02 DIAGNOSIS — F17.210 MODERATE SMOKER (20 OR LESS PER DAY): Primary | ICD-10-CM

## 2019-01-02 DIAGNOSIS — F17.210 HEAVY SMOKER (MORE THAN 20 CIGARETTES PER DAY): ICD-10-CM

## 2019-01-02 PROCEDURE — 90853 GROUP PSYCHOTHERAPY: CPT | Mod: S$GLB,,,

## 2019-01-02 PROCEDURE — 90853 PR GROUP PSYCHOTHERAPY: ICD-10-PCS | Mod: S$GLB,,,

## 2019-01-02 RX ORDER — NICOTINE 7MG/24HR
1 PATCH, TRANSDERMAL 24 HOURS TRANSDERMAL DAILY
Qty: 14 PATCH | Refills: 0 | Status: SHIPPED | OUTPATIENT
Start: 2019-01-02 | End: 2019-01-16 | Stop reason: SDUPTHER

## 2019-01-02 NOTE — Clinical Note
The patient is smoking 10 cigarettes/day using the 21 mg nicotine patch inconsistently. He was trying to wear it every other day because it was too much. Suggested he wear a lower dose patch - he wants 7 mg daily instead. He was congratulated on his success so far. He states he does not like the smoking diary, but does keep up with what he smokes. If he gets where he struggling too much, may use 2 7 mg (14 MG) patches. Will try to reduce by 1 cigarette/week for a while.

## 2019-01-03 NOTE — PROGRESS NOTES
Smoking Cessation Group Session #2    Site: [facility]  Date:  1/2/2019  Clinical Status of Patient: Outpatient   Length of Service and Code: 60 minutes - 54244   Number in Attendance: 3  Group Activities/Focus of Group:  Sharing last weeks challenges, triggers, and coping activities to remain quit and/ or keep making progress toward cessation, completion of TCRS (Tobacco Cessation Rating Scale) learned addiction model, personal reasons for quitting, medications, goals, quit date.    Specific session focus: completion of TCRS (Tobacco Cessation Rating Scale) reviewed strategies, habitual behavior, stress, and high risk situations. Introduced stress with addition interventions, SOLVE, relaxation with interventions, nutrition, exercise, weight gain, and the importance of rewarding oneself for accomplishments toward becoming tobacco free. Open discussion of all items with interventions.       Target symptoms:  withdrawal and medication side effects             The following were rated moderate (3) to severe (4) on TCRS:       Moderate 3: none     Severe 4:   none  Patient's Response to Intervention: The patient is smoking 10 cigarettes/day using the 21 mg nicotine patch inconsistently. He was trying to wear it every other day because it was too much. Suggested he wear a lower dose patch - he wants 7 mg daily instead. He was congratulated on his success so far. He states he does not like the smoking diary, but does keep up with what he smokes. If he gets where he struggling too much, may use 2 7 mg (14 MG) patches. Will try to reduce by 1 cigarette/week for a while.   Progress Toward Goals and Other Mental Status Changes: The patient denies any abnormal behavioral or mental changes at this time.    Interval History:     Diagnosis: F17.210    Plan: The patient will continue with group therapy sessions and medication regimen prescribed with management by physician or by the Cessation [facility] Provider. Patient will inform  Smoking Cessation Counselor of symptoms as rated high on TCRS.    Return to Clinic: 1 week    Quit Date: TBD   Planned Quit Date: TBD

## 2019-01-09 ENCOUNTER — CLINICAL SUPPORT (OUTPATIENT)
Dept: SMOKING CESSATION | Facility: CLINIC | Age: 64
End: 2019-01-09
Payer: COMMERCIAL

## 2019-01-09 DIAGNOSIS — F17.210 MODERATE SMOKER (20 OR LESS PER DAY): Primary | ICD-10-CM

## 2019-01-09 PROCEDURE — 90853 PR GROUP PSYCHOTHERAPY: ICD-10-PCS | Mod: S$GLB,,,

## 2019-01-09 PROCEDURE — 90853 GROUP PSYCHOTHERAPY: CPT | Mod: S$GLB,,,

## 2019-01-09 NOTE — Clinical Note
The patient is smoking 9 cigarettes per day using the 7 mg nicotine patch. He is very proud of his progress. He states he still has cravings but is trying to use diversion to learn how to quit. He is doing well on reduction going at his own pace. Will continue to reduce then will begin dry runs.

## 2019-01-10 NOTE — PROGRESS NOTES
Smoking Cessation Group Session #3    Site: [facility]  Date:  1/9/2019  Clinical Status of Patient: Outpatient   Length of Service and Code: 90 minutes - [id number]   Number in Attendance: 5  Group Activities/Focus of Group:  Sharing last weeks challenges, triggers, and coping activities to remain quit and/ or keep making progress toward cessation, completion of TCRS (Tobacco Cessation Rating Scale) learned addiction model, personal reasons for quitting, medications, goals, quit date.    Specific session focus: completion of TCRS (Tobacco Cessation Rating Scale) reviewed strategies, controlling environment, cues, triggers, new goals set. Introduced high risk situations with preparation interventions, caffeine similarities with withdrawal issues of habit and nicotine, Alcohol, Understanding urges, cravings, stress and relaxation. Open discussion with intervention discussion.      Target symptoms:  withdrawal and medication side effects             The following were rated moderate (3) to severe (4) on TCRS:       Moderate 3: none     Severe 4:   none  Patient's Response to Intervention: The patient is smoking 9 cigarettes per day using the 7 mg nicotine patch. He is very proud of his progress. He states he still has cravings but is trying to use diversion to learn how to quit. He is doing well on reduction going at his own pace. Will continue to reduce then will begin dry runs.   Progress Toward Goals and Other Mental Status Changes: The patient denies any abnormal behavioral or mental changes at this time.  Interval History: 0    Diagnosis:F17.210    Plan: The patient will continue with group therapy sessions and medication regimen prescribed with management by physician or by the Cessation Clinic Provider. Patient will inform Smoking Cessation Counselor of symptoms as rated high on TCRS.    Return to Clinic: 1 week    Quit Date: TBD   Planned Quit Date: TBD

## 2019-01-16 ENCOUNTER — CLINICAL SUPPORT (OUTPATIENT)
Dept: SMOKING CESSATION | Facility: CLINIC | Age: 64
End: 2019-01-16
Payer: COMMERCIAL

## 2019-01-16 DIAGNOSIS — F17.210 HEAVY SMOKER (MORE THAN 20 CIGARETTES PER DAY): ICD-10-CM

## 2019-01-16 DIAGNOSIS — F17.210 MODERATE SMOKER (20 OR LESS PER DAY): Primary | ICD-10-CM

## 2019-01-16 PROCEDURE — 90853 GROUP PSYCHOTHERAPY: CPT | Mod: S$GLB,,,

## 2019-01-16 PROCEDURE — 90853 PR GROUP PSYCHOTHERAPY: ICD-10-PCS | Mod: S$GLB,,,

## 2019-01-16 RX ORDER — NICOTINE 7MG/24HR
1 PATCH, TRANSDERMAL 24 HOURS TRANSDERMAL DAILY
Qty: 28 PATCH | Refills: 0 | Status: SHIPPED | OUTPATIENT
Start: 2019-01-16 | End: 2019-01-30 | Stop reason: DRUGHIGH

## 2019-01-17 NOTE — PROGRESS NOTES
Smoking Cessation Group Session #4    Site: [facility]  Date:  1/16/2019  Clinical Status of Patient: Outpatient   Length of Service and Code: 90 minutes - 91207   Number in Attendance: 5  Group Activities/Focus of Group:  Sharing last weeks challenges, triggers, and coping activities to remain quit and/ or keep making progress toward cessation, completion of TCRS (Tobacco Cessation Rating Scale) learned addiction model, personal reasons for quitting, medications, goals, quit date.    Specific session focus: completion of TCRS (Tobacco Cessation Rating Scale) reviewed strategies, habitual behavior, stress, and high risk situations. Introduced stress with addition interventions, SOLVE, relaxation with interventions, nutrition, exercise, weight gain, and the importance of rewarding oneself for accomplishments toward becoming tobacco free. Open discussion of all items with interventions.         Target symptoms:  withdrawal and medication side effects             The following were rated moderate (3) to severe (4) on TCRS:       Moderate 3: desire/cravings habit     Severe 4:   none  Patient's Response to Intervention: The patient is smoking 9 cigarettes/day using the 7 mg nicotine patch. He states he is proud of his progress  Coming down from 30 cigarettes/day. He states he does have cravings, but is working delaying tactics not to smoke more. He will cut back 1-2 cigarettes/day.  Progress Toward Goals and Other Mental Status Changes: The patient denies any abnormal behavioral or mental changes at this time.    Interval History: 0    Diagnosis:F17.210    Plan: The patient will continue with group therapy sessions and medication regimen prescribed with management by physician or by the Cessation Clinic Provider. Patient will inform Smoking Cessation Counselor of symptoms as rated high on TCRS.    Return to Clinic: 1 week    Quit Date: TBD   Planned Quit Date: TBD

## 2019-01-23 ENCOUNTER — CLINICAL SUPPORT (OUTPATIENT)
Dept: SMOKING CESSATION | Facility: CLINIC | Age: 64
End: 2019-01-23
Payer: COMMERCIAL

## 2019-01-23 DIAGNOSIS — F17.210 MODERATE SMOKER (20 OR LESS PER DAY): Primary | ICD-10-CM

## 2019-01-25 ENCOUNTER — LAB VISIT (OUTPATIENT)
Dept: LAB | Facility: HOSPITAL | Age: 64
End: 2019-01-25
Attending: FAMILY MEDICINE
Payer: COMMERCIAL

## 2019-01-25 DIAGNOSIS — R73.03 PREDIABETES: ICD-10-CM

## 2019-01-25 DIAGNOSIS — Z79.899 MEDICATION MANAGEMENT: ICD-10-CM

## 2019-01-25 DIAGNOSIS — E66.9 OBESITY (BMI 30.0-34.9): ICD-10-CM

## 2019-01-25 DIAGNOSIS — F33.1 MAJOR DEPRESSIVE DISORDER, RECURRENT, MODERATE: ICD-10-CM

## 2019-01-25 DIAGNOSIS — Z12.5 ENCOUNTER FOR PROSTATE CANCER SCREENING: ICD-10-CM

## 2019-01-25 DIAGNOSIS — E78.2 COMBINED HYPERLIPIDEMIA: ICD-10-CM

## 2019-01-25 DIAGNOSIS — F41.9 ANXIETY: ICD-10-CM

## 2019-01-25 LAB
25(OH)D3+25(OH)D2 SERPL-MCNC: 20 NG/ML
ALBUMIN SERPL BCP-MCNC: 3.9 G/DL
ALP SERPL-CCNC: 106 U/L
ALT SERPL W/O P-5'-P-CCNC: 17 U/L
ANION GAP SERPL CALC-SCNC: 7 MMOL/L
AST SERPL-CCNC: 17 U/L
BASOPHILS # BLD AUTO: 0.02 K/UL
BASOPHILS NFR BLD: 0.2 %
BILIRUB SERPL-MCNC: 0.4 MG/DL
BUN SERPL-MCNC: 16 MG/DL
CALCIUM SERPL-MCNC: 9.3 MG/DL
CHLORIDE SERPL-SCNC: 106 MMOL/L
CHOLEST SERPL-MCNC: 161 MG/DL
CHOLEST/HDLC SERPL: 4.7 {RATIO}
CO2 SERPL-SCNC: 27 MMOL/L
COMPLEXED PSA SERPL-MCNC: 1.5 NG/ML
CREAT SERPL-MCNC: 1 MG/DL
DIFFERENTIAL METHOD: NORMAL
EOSINOPHIL # BLD AUTO: 0.3 K/UL
EOSINOPHIL NFR BLD: 3.8 %
ERYTHROCYTE [DISTWIDTH] IN BLOOD BY AUTOMATED COUNT: 14.5 %
EST. GFR  (AFRICAN AMERICAN): >60 ML/MIN/1.73 M^2
EST. GFR  (NON AFRICAN AMERICAN): >60 ML/MIN/1.73 M^2
ESTIMATED AVG GLUCOSE: 120 MG/DL
GLUCOSE SERPL-MCNC: 91 MG/DL
HBA1C MFR BLD HPLC: 5.8 %
HCT VFR BLD AUTO: 47.2 %
HDLC SERPL-MCNC: 34 MG/DL
HDLC SERPL: 21.1 %
HGB BLD-MCNC: 15.3 G/DL
LDLC SERPL CALC-MCNC: 90.8 MG/DL
LYMPHOCYTES # BLD AUTO: 2.1 K/UL
LYMPHOCYTES NFR BLD: 24.1 %
MCH RBC QN AUTO: 29.1 PG
MCHC RBC AUTO-ENTMCNC: 32.4 G/DL
MCV RBC AUTO: 90 FL
MONOCYTES # BLD AUTO: 0.6 K/UL
MONOCYTES NFR BLD: 6.5 %
NEUTROPHILS # BLD AUTO: 5.5 K/UL
NEUTROPHILS NFR BLD: 65 %
NONHDLC SERPL-MCNC: 127 MG/DL
PLATELET # BLD AUTO: 183 K/UL
PMV BLD AUTO: 11.3 FL
POTASSIUM SERPL-SCNC: 4 MMOL/L
PROT SERPL-MCNC: 6.3 G/DL
RBC # BLD AUTO: 5.25 M/UL
SODIUM SERPL-SCNC: 140 MMOL/L
TRIGL SERPL-MCNC: 181 MG/DL
TSH SERPL DL<=0.005 MIU/L-ACNC: 2.6 UIU/ML
WBC # BLD AUTO: 8.49 K/UL

## 2019-01-25 PROCEDURE — 80053 COMPREHEN METABOLIC PANEL: CPT

## 2019-01-25 PROCEDURE — 36415 COLL VENOUS BLD VENIPUNCTURE: CPT

## 2019-01-25 PROCEDURE — 85025 COMPLETE CBC W/AUTO DIFF WBC: CPT

## 2019-01-25 PROCEDURE — 84153 ASSAY OF PSA TOTAL: CPT

## 2019-01-25 PROCEDURE — 83036 HEMOGLOBIN GLYCOSYLATED A1C: CPT

## 2019-01-25 PROCEDURE — 84443 ASSAY THYROID STIM HORMONE: CPT

## 2019-01-25 PROCEDURE — 80061 LIPID PANEL: CPT

## 2019-01-25 PROCEDURE — 82306 VITAMIN D 25 HYDROXY: CPT

## 2019-01-27 ENCOUNTER — TELEPHONE (OUTPATIENT)
Dept: FAMILY MEDICINE | Facility: CLINIC | Age: 64
End: 2019-01-27

## 2019-01-27 DIAGNOSIS — E55.9 VITAMIN D DEFICIENCY: ICD-10-CM

## 2019-01-27 RX ORDER — ERGOCALCIFEROL 1.25 MG/1
50000 CAPSULE ORAL
Qty: 15 CAPSULE | Refills: 3 | Status: SHIPPED | OUTPATIENT
Start: 2019-01-27 | End: 2019-02-26

## 2019-01-30 ENCOUNTER — CLINICAL SUPPORT (OUTPATIENT)
Dept: SMOKING CESSATION | Facility: CLINIC | Age: 64
End: 2019-01-30
Payer: COMMERCIAL

## 2019-01-30 DIAGNOSIS — F17.210 MODERATE SMOKER (20 OR LESS PER DAY): Primary | ICD-10-CM

## 2019-01-30 PROCEDURE — 90853 GROUP PSYCHOTHERAPY: CPT | Mod: S$GLB,,,

## 2019-01-30 PROCEDURE — 90853 PR GROUP PSYCHOTHERAPY: ICD-10-PCS | Mod: S$GLB,,,

## 2019-01-30 RX ORDER — IBUPROFEN 200 MG
1 TABLET ORAL DAILY
Qty: 14 PATCH | Refills: 0 | Status: SHIPPED | OUTPATIENT
Start: 2019-01-30 | End: 2019-02-06 | Stop reason: SDUPTHER

## 2019-01-31 NOTE — PROGRESS NOTES
Smoking Cessation Group Session #5    Site: [facility]  Date:  1/30/2019  Clinical Status of Patient: Outpatient   Length of Service and Code: 60 minutes - 21972   Number in Attendance: 6  Group Activities/Focus of Group:  Sharing last weeks challenges, triggers, and coping activities to remain quit and/ or keep making progress toward cessation, completion of TCRS (Tobacco Cessation Rating Scale) learned addiction model, personal reasons for quitting, medications, goals, quit date.    Specific session focus: completion of TCRS (Tobacco Cessation Rating Scale) reviewed strategies, habitual behavior, stress, and high risk situations. Introduced stress with addition interventions, SOLVE, relaxation with interventions, nutrition, exercise, weight gain, and the importance of rewarding oneself for accomplishments toward becoming tobacco free. Open discussion of all items with interventions.       Target symptoms:  withdrawal and medication side effects             The following were rated moderate (3) to severe (4) on TCRS:       Moderate 3: none     Severe 4:   none  Patient's Response to Intervention: The patient is smoking  cigarettes 8 / day using  14 mg nicotine patches. He has been able to work on not smoking 3 cigarettes in the morning, but 1. He was congratulated on he efforts. He will continue to work on reduction and to work on managing stress. The CO measurement = 12 ppm which indicates a heavy smoker.      Progress Toward Goals and Other Mental Status Changes: The patient denies any abnormal behavioral or mental changes at this time.    Interval History: 0    Diagnosis: F17.210    Plan: The patient will continue with group therapy sessions and medication regimen prescribed with management by physician or by the Cessation Clinic Provider. Patient will inform Smoking Cessation Counselor of symptoms as rated high on TCRS.    Return to Clinic: 1 week       Planned Quit Date: TBD

## 2019-02-06 ENCOUNTER — CLINICAL SUPPORT (OUTPATIENT)
Dept: SMOKING CESSATION | Facility: CLINIC | Age: 64
End: 2019-02-06
Payer: COMMERCIAL

## 2019-02-06 DIAGNOSIS — F17.210 MODERATE SMOKER (20 OR LESS PER DAY): Primary | ICD-10-CM

## 2019-02-06 RX ORDER — DM/P-EPHED/ACETAMINOPH/DOXYLAM 30-7.5/3
2 LIQUID (ML) ORAL
Qty: 270 LOZENGE | Refills: 0 | Status: SHIPPED | OUTPATIENT
Start: 2019-02-06 | End: 2019-02-14 | Stop reason: SDUPTHER

## 2019-02-06 RX ORDER — IBUPROFEN 200 MG
1 TABLET ORAL DAILY
Qty: 14 PATCH | Refills: 0 | Status: SHIPPED | OUTPATIENT
Start: 2019-02-06 | End: 2019-02-14 | Stop reason: SDUPTHER

## 2019-02-06 NOTE — PROGRESS NOTES
Individual Follow-Up Form    2/6/2019    Quit Date: TBD    Clinical Status of Patient: Outpatient    Length of Service: 60 minutes    Continuing Medication: yes  Patches    Other Medications: none     Target Symptoms: Withdrawal and medication side effects. The following were  rated moderate (3) to severe (4) on TCRS:  · Moderate (3): none  · Severe (4): none    Comments: The patient is smoking cigarettes 4 / day using 14 mg nicotine patch. The patient has been working on reduction and has been successful some days.   Would like 2 mg nicotine lozenges.Will work on dry runs to get use to not smoking .Reordered the 14 mg nicotine patch today.completion of TCRS (Tobacco Cessation Rating Scale) reviewed strategies, habitual behavior, high risks situations, understanding urges and cravings, stress and relaxation with open discussion and additional interventions, Introduced lapses, relapses, understanding them and analyzing the situation of a lapse, conflict issues that may be linked to a lapse.The patient will continue individual sessions and medication monitoring by CTTS. Prescribed medication management will be by practitioner. The patient denies any abnormal behavioral or mental changes at this time.    Diagnosis: F17.210     Next Visit: 1 week

## 2019-02-07 NOTE — PROGRESS NOTES
Office Visit    Patient Name: Stef Pool    : 1955  MRN: [id number]    Subjective:  Stef is a 63 y.o. male who presents today for:    Annual Exam (flu shot)    64 yo male here for annual exam and monitoring of chronic conditions including obesity, prediabetes, BARAK on CPAP, hyperlipidemia (with ongoing triglyceride elevation and low HDL), tobacco abuse, anxiety and depression (managed w/ Zoloft 100 and Xanax 0.5 prn). Labs drawn prior to office visit 19 include normal PSA/TSH/CBC/EGFR/LFTS/A1c (5.8 on Metformin 500 mg XR daily). Vitamin D level is low at 20 and he has started the prescription weekly supplement- 50,000 IU Q7days. Lipid panel shows mildly elevated trig at 181/ HDL 34/ LDL 90 on Lipitor 40 mg daily.      He has been feeling over all well. General mood outlook is good-- helped with daily zoloft. Anxiety is managed with once daily xanax. Today he complains of some BPH type symptoms-- frequency, weak stream of urine, trouble emptying bladder. No dysuria or hematuria.      Diet: has decreased fried foods, soft drinks, coffee and drinking more water.  At times he has skipped lunch and he reports that this has helped him to manage his weight.      Exercise: walking about 3 times week 1-2 miles, walks dogs      Sleep: good-- 6-7 hours nightly-- NOT COMPLIANT with CPAP nightly but still feels overall rested     Weight is overall stable over the last couple of years-- about 220 since .       Smoking Cessation Update: He did not end up starting Chantix but is actively involved in the smoking cessation program. Using the Nicoderm Patch and Nicorette Lozenges He reports he is down from 27 to 8 cigarettes daily but has levelled off at this level     Screening tests: colonoscopy (2012) and repeat --overdue and again ordered, PSA normal/ stable at 1.5 2019, hep C neg 2015     Immunizations: TDaP 2013, FLU shot today, shingles vaccine through pharmacy 1/10/2018, Pneumovax 23  1/31/2012 (smoker)     Eye/Dental: eye Lisandro 1/26/2018- RTC one year & Ophtho dr Peters 4/17/18, dental UTD     Past Medical History  Past Medical History:   Diagnosis Date    Anxiety 11/20/2015    Combined hyperlipidemia 11/23/2012    HEARING LOSS     Major depressive disorder, recurrent, moderate 11/23/2012    Obesity (BMI 30.0-34.9) 11/20/2015    diet and exercise changes discussed     BARAK on CPAP 11/20/2015    uses at least 4 hours nightly     Prediabetes 11/23/2016    making dietary and exercise changes and staring Metformin 500 mg XR daily.  recheck in 3 months    Smoker 5/2/2013       Past Surgical History  History reviewed. No pertinent surgical history.    Family History  Family History   Problem Relation Age of Onset    Heart disease Mother        Social History  Social History     Socioeconomic History    Marital status:      Spouse name: Not on file    Number of children: Not on file    Years of education: Not on file    Highest education level: Not on file   Social Needs    Financial resource strain: Not on file    Food insecurity - worry: Not on file    Food insecurity - inability: Not on file    Transportation needs - medical: Not on file    Transportation needs - non-medical: Not on file   Occupational History    Not on file   Tobacco Use    Smoking status: Current Every Day Smoker    Smokeless tobacco: Never Used   Substance and Sexual Activity    Alcohol use: Yes    Drug use: No    Sexual activity: Not on file   Other Topics Concern    Not on file   Social History Narrative    Not on file       Current Medications  Medications reviewed and updated.     Allergies   Review of patient's allergies indicates:  No Known Allergies    Review of Systems (Pertinent positives)  Review of Systems   Constitutional: Negative for unexpected weight change.   HENT: Negative for trouble swallowing.    Eyes: Negative for visual disturbance.   Respiratory: Positive for shortness of  "breath (overall stable and with good exercise tolerance).    Cardiovascular: Negative for chest pain.   Gastrointestinal: Negative for abdominal pain, constipation, diarrhea, nausea and vomiting.   Genitourinary: Positive for frequency and urgency. Negative for dysuria and hematuria.   Skin:        Skin tags and mole concern    Allergic/Immunologic: Negative for environmental allergies.   Neurological: Negative for syncope.   Psychiatric/Behavioral: Negative for dysphoric mood and sleep disturbance. The patient is not nervous/anxious.        /70   Pulse 64   Ht 5' 10" (1.778 m)   Wt 99.4 kg (219 lb 2.2 oz)   SpO2 96%   BMI 31.44 kg/m²     Physical Exam   Constitutional: He is oriented to person, place, and time. He appears well-developed and well-nourished. No distress.   HENT:   Head: Normocephalic and atraumatic.   Right Ear: External ear normal.   Left Ear: External ear normal.   Nose: Nose normal.   Mouth/Throat: Oropharynx is clear and moist. No oropharyngeal exudate.   Eyes: Conjunctivae are normal. No scleral icterus.   Neck: No tracheal deviation present. No thyromegaly present.   Cardiovascular: Normal rate, regular rhythm, normal heart sounds and intact distal pulses.   Pulmonary/Chest: Effort normal and breath sounds normal.   Abdominal: Soft. Bowel sounds are normal. He exhibits no distension and no mass. There is no tenderness. No hernia.   Genitourinary:   Genitourinary Comments: Prostate exam deferred today- about to have colonoscopy and will be checked then on rectal exam.       Musculoskeletal: Normal range of motion.   Lymphadenopathy:     He has no cervical adenopathy.   Neurological: He is alert and oriented to person, place, and time. He has normal reflexes.   Skin: Skin is warm and dry. No rash noted.   Psychiatric: He has a normal mood and affect.   Vitals reviewed.        Assessment/Plan:  Stef Pool is a 63 y.o. male who presents today for :    Stef was seen today for annual " exam.    Diagnoses and all orders for this visit:    Routine general medical examination at a health care facility  Comments:  Health maintenance reviewed:  Colonoscopy ordered, flu shot given, otherwise up-to-date.  Advised on diet/exercise, eye/dental exams  Orders:  -     Hemoglobin A1c; Future  -     Comprehensive metabolic panel; Future  -     Lipid panel; Future  -     Vitamin D; Future  -     Influenza - Quadrivalent (3 years & older) (PF)  -     Case request GI: COLONOSCOPY    Obesity (BMI 30.0-34.9)    Prediabetes  Comments:  A1c 5.8 on metformin 500 mg extended release daily with breakfast, continue and recheck in 6 months  Orders:  -     Hemoglobin A1c; Future  -     Comprehensive metabolic panel; Future  -     metFORMIN (GLUCOPHAGE-XR) 500 MG 24 hr tablet; Take 1 tablet (500 mg total) by mouth daily with breakfast.    Nuclear sclerosis, bilateral    BARAK on CPAP  Comments:  needs order for new CPAP supplies  Orders:  -     CPAP/BIPAP SUPPLIES    Combined hyperlipidemia  -     Lipid panel; Future  -     atorvastatin (LIPITOR) 40 MG tablet; Take 1 tablet (40 mg total) by mouth once daily.    Smoker  Comments:  Currently active in smoking cessation program, good compliance with visits.    Long-term use of aspirin therapy    Medication management  -     Hemoglobin A1c; Future  -     Comprehensive metabolic panel; Future  -     Lipid panel; Future  -     Vitamin D; Future    Major depressive disorder, recurrent, moderate  -     sertraline (ZOLOFT) 100 MG tablet; Take 1 tablet (100 mg total) by mouth once daily.    Anxiety  Comments:  Stable on Zoloft 100 mg daily, takes Xanax 0.5 mg daily p.r.n.    Vitamin D deficiency  Comments:  Starting weekly prescription vitamin-D we will recheck in 6 months  Orders:  -     Vitamin D; Future    Needs flu shot  -     Influenza - Quadrivalent (3 years & older) (PF)    Colon cancer screening  -     Case request GI: COLONOSCOPY    Benign prostatic hyperplasia with urinary  hesitancy  Comments:  Trial of Flomax 0.4-0.8 mg daily after dinner, re-evaluate based on response  Orders:  -     tamsulosin (FLOMAX) 0.4 mg Cap; Take 2 capsules (0.8 mg total) by mouth every evening.            ICD-10-CM ICD-9-CM    1. Routine general medical examination at a health care facility Z00.00 V70.0 Hemoglobin A1c      Comprehensive metabolic panel      Lipid panel      Vitamin D      Influenza - Quadrivalent (3 years & older) (PF)      Case request GI: COLONOSCOPY    Health maintenance reviewed:  Colonoscopy ordered, flu shot given, otherwise up-to-date.  Advised on diet/exercise, eye/dental exams   2. Obesity (BMI 30.0-34.9) E66.9 278.00    3. Prediabetes R73.03 790.29 Hemoglobin A1c      Comprehensive metabolic panel      metFORMIN (GLUCOPHAGE-XR) 500 MG 24 hr tablet    A1c 5.8 on metformin 500 mg extended release daily with breakfast, continue and recheck in 6 months   4. Nuclear sclerosis, bilateral H25.13 366.16    5. BARAK on CPAP G47.33 327.23 CPAP/BIPAP SUPPLIES    Z99.89 V46.8     needs order for new CPAP supplies   6. Combined hyperlipidemia E78.2 272.2 Lipid panel      atorvastatin (LIPITOR) 40 MG tablet   7. Smoker F17.200 305.1     Currently active in smoking cessation program, good compliance with visits.   8. Long-term use of aspirin therapy Z79.82 V58.66    9. Medication management Z79.899 V58.69 Hemoglobin A1c      Comprehensive metabolic panel      Lipid panel      Vitamin D   10. Major depressive disorder, recurrent, moderate F33.1 296.32 sertraline (ZOLOFT) 100 MG tablet   11. Anxiety F41.9 300.00     Stable on Zoloft 100 mg daily, takes Xanax 0.5 mg daily p.r.n.   12. Vitamin D deficiency E55.9 268.9 Vitamin D    Starting weekly prescription vitamin-D we will recheck in 6 months   13. Needs flu shot Z23 V04.81 Influenza - Quadrivalent (3 years & older) (PF)   14. Colon cancer screening Z12.11 V76.51 Case request GI: COLONOSCOPY   15. Benign prostatic hyperplasia with urinary hesitancy  N40.1 600.01 tamsulosin (FLOMAX) 0.4 mg Cap    R39.11 788.64     Trial of Flomax 0.4-0.8 mg daily after dinner, re-evaluate based on response       There are no Patient Instructions on file for this visit.      Follow-up in about 6 months (around 8/8/2019) for to follow up on lab results, return as needed for new concerns.

## 2019-02-08 ENCOUNTER — OFFICE VISIT (OUTPATIENT)
Dept: FAMILY MEDICINE | Facility: CLINIC | Age: 64
End: 2019-02-08
Payer: COMMERCIAL

## 2019-02-08 VITALS
OXYGEN SATURATION: 96 % | DIASTOLIC BLOOD PRESSURE: 70 MMHG | HEART RATE: 64 BPM | BODY MASS INDEX: 31.37 KG/M2 | HEIGHT: 70 IN | WEIGHT: 219.13 LBS | SYSTOLIC BLOOD PRESSURE: 133 MMHG

## 2019-02-08 DIAGNOSIS — Z23 NEEDS FLU SHOT: ICD-10-CM

## 2019-02-08 DIAGNOSIS — Z79.82 LONG-TERM USE OF ASPIRIN THERAPY: ICD-10-CM

## 2019-02-08 DIAGNOSIS — G47.33 OSA ON CPAP: ICD-10-CM

## 2019-02-08 DIAGNOSIS — H25.13 NUCLEAR SCLEROSIS, BILATERAL: ICD-10-CM

## 2019-02-08 DIAGNOSIS — Z00.00 ROUTINE GENERAL MEDICAL EXAMINATION AT A HEALTH CARE FACILITY: Primary | ICD-10-CM

## 2019-02-08 DIAGNOSIS — R73.03 PREDIABETES: ICD-10-CM

## 2019-02-08 DIAGNOSIS — Z79.899 MEDICATION MANAGEMENT: ICD-10-CM

## 2019-02-08 DIAGNOSIS — N40.1 BENIGN PROSTATIC HYPERPLASIA WITH URINARY HESITANCY: ICD-10-CM

## 2019-02-08 DIAGNOSIS — F33.1 MAJOR DEPRESSIVE DISORDER, RECURRENT, MODERATE: ICD-10-CM

## 2019-02-08 DIAGNOSIS — E78.2 COMBINED HYPERLIPIDEMIA: ICD-10-CM

## 2019-02-08 DIAGNOSIS — R39.11 BENIGN PROSTATIC HYPERPLASIA WITH URINARY HESITANCY: ICD-10-CM

## 2019-02-08 DIAGNOSIS — Z12.11 COLON CANCER SCREENING: ICD-10-CM

## 2019-02-08 DIAGNOSIS — E66.9 OBESITY (BMI 30.0-34.9): ICD-10-CM

## 2019-02-08 DIAGNOSIS — F41.9 ANXIETY: ICD-10-CM

## 2019-02-08 DIAGNOSIS — F17.200 SMOKER: ICD-10-CM

## 2019-02-08 DIAGNOSIS — E55.9 VITAMIN D DEFICIENCY: ICD-10-CM

## 2019-02-08 PROCEDURE — 90686 FLU VACCINE (QUAD) GREATER THAN OR EQUAL TO 3YO PRESERVATIVE FREE IM: ICD-10-PCS | Mod: S$GLB,,, | Performed by: FAMILY MEDICINE

## 2019-02-08 PROCEDURE — 99396 PREV VISIT EST AGE 40-64: CPT | Mod: 25,S$GLB,, | Performed by: FAMILY MEDICINE

## 2019-02-08 PROCEDURE — 90686 IIV4 VACC NO PRSV 0.5 ML IM: CPT | Mod: S$GLB,,, | Performed by: FAMILY MEDICINE

## 2019-02-08 PROCEDURE — 99999 PR PBB SHADOW E&M-EST. PATIENT-LVL III: CPT | Mod: PBBFAC,,, | Performed by: FAMILY MEDICINE

## 2019-02-08 PROCEDURE — 90471 IMMUNIZATION ADMIN: CPT | Mod: S$GLB,,, | Performed by: FAMILY MEDICINE

## 2019-02-08 PROCEDURE — 99999 PR PBB SHADOW E&M-EST. PATIENT-LVL III: ICD-10-PCS | Mod: PBBFAC,,, | Performed by: FAMILY MEDICINE

## 2019-02-08 PROCEDURE — 90471 FLU VACCINE (QUAD) GREATER THAN OR EQUAL TO 3YO PRESERVATIVE FREE IM: ICD-10-PCS | Mod: S$GLB,,, | Performed by: FAMILY MEDICINE

## 2019-02-08 PROCEDURE — 99396 PR PREVENTIVE VISIT,EST,40-64: ICD-10-PCS | Mod: 25,S$GLB,, | Performed by: FAMILY MEDICINE

## 2019-02-08 RX ORDER — ATORVASTATIN CALCIUM 40 MG/1
40 TABLET, FILM COATED ORAL DAILY
Qty: 90 TABLET | Refills: 3 | Status: SHIPPED | OUTPATIENT
Start: 2019-02-08 | End: 2020-02-17

## 2019-02-08 RX ORDER — TAMSULOSIN HYDROCHLORIDE 0.4 MG/1
0.8 CAPSULE ORAL NIGHTLY
Qty: 60 CAPSULE | Refills: 11 | Status: SHIPPED | OUTPATIENT
Start: 2019-02-08 | End: 2020-02-17

## 2019-02-08 RX ORDER — METFORMIN HYDROCHLORIDE 500 MG/1
500 TABLET, EXTENDED RELEASE ORAL
Qty: 90 TABLET | Refills: 3 | Status: SHIPPED | OUTPATIENT
Start: 2019-02-08 | End: 2020-02-17

## 2019-02-08 RX ORDER — SERTRALINE HYDROCHLORIDE 100 MG/1
100 TABLET, FILM COATED ORAL DAILY
Qty: 90 TABLET | Refills: 3 | Status: SHIPPED | OUTPATIENT
Start: 2019-02-08 | End: 2020-02-17

## 2019-02-11 ENCOUNTER — TELEPHONE (OUTPATIENT)
Dept: GASTROENTEROLOGY | Facility: CLINIC | Age: 64
End: 2019-02-11

## 2019-02-13 ENCOUNTER — CLINICAL SUPPORT (OUTPATIENT)
Dept: SMOKING CESSATION | Facility: CLINIC | Age: 64
End: 2019-02-13
Payer: COMMERCIAL

## 2019-02-13 DIAGNOSIS — F17.210 MODERATE SMOKER (20 OR LESS PER DAY): Primary | ICD-10-CM

## 2019-02-13 PROCEDURE — 99404 PREV MED CNSL INDIV APPRX 60: CPT | Mod: S$GLB,,,

## 2019-02-13 PROCEDURE — 99404 PR PREVENT COUNSEL,INDIV,60 MIN: ICD-10-PCS | Mod: S$GLB,,,

## 2019-02-13 NOTE — Clinical Note
The patient is smoking  8 cigarettes/day using the 14 mg nicotine patch and 2 mg lozenges. He states he has worked on not smoking with his coffee which was hard, but does not smoke in his car any more. He is making good progress and is determined not to smoke. He states he will work on more association/ delay coping styles to reduce further. He still does not want to use the  Chantix, but will continue with the patches and feels it is working. Reordered patches today.

## 2019-02-14 RX ORDER — DM/P-EPHED/ACETAMINOPH/DOXYLAM 30-7.5/3
2 LIQUID (ML) ORAL
Qty: 270 LOZENGE | Refills: 0 | Status: SHIPPED | OUTPATIENT
Start: 2019-02-14 | End: 2019-05-22 | Stop reason: SDUPTHER

## 2019-02-14 RX ORDER — IBUPROFEN 200 MG
1 TABLET ORAL DAILY
Qty: 28 PATCH | Refills: 0 | Status: SHIPPED | OUTPATIENT
Start: 2019-02-14 | End: 2019-03-14 | Stop reason: SDUPTHER

## 2019-02-14 NOTE — PROGRESS NOTES
Individual Follow-Up Form    2/14/2019    Quit Date: TBD    Clinical Status of Patient: Outpatient    Length of Service: 60 minutes  none  Continuing Medication: yes  Patches    Other Medications: 2 mg lozenges     Target Symptoms: Withdrawal and medication side effects. The following were  rated moderate (3) to severe (4) on TCRS:  · Moderate (3): none  · Severe (4): none    Comments: The patient is smoking  8 cigarettes/day using the 14 mg nicotine patch and 2 mg lozenges. He states he has worked on not smoking with his coffee which was hard, but does not smoke in his car any more. He is making good progress and is determined not to smoke. He states he will work on more association/ delay coping styles to reduce further. He still does not want to use the  Chantix, but will continue with the patches and feels it is working. Reordered patches today.     Diagnosis: F17.210    Next Visit: 1 week

## 2019-02-20 ENCOUNTER — CLINICAL SUPPORT (OUTPATIENT)
Dept: SMOKING CESSATION | Facility: CLINIC | Age: 64
End: 2019-02-20
Payer: COMMERCIAL

## 2019-02-20 DIAGNOSIS — F17.210 MODERATE SMOKER (20 OR LESS PER DAY): Primary | ICD-10-CM

## 2019-02-20 PROCEDURE — 99404 PREV MED CNSL INDIV APPRX 60: CPT | Mod: S$GLB,,,

## 2019-02-20 PROCEDURE — 99404 PR PREVENT COUNSEL,INDIV,60 MIN: ICD-10-PCS | Mod: S$GLB,,,

## 2019-02-20 NOTE — PROGRESS NOTES
Individual Follow-Up Form    2/20/2019    Quit Date: TBD    Clinical Status of Patient: Outpatient    Length of Service: 60 minutes    Continuing Medication: yes  Patches    Other Medications: 2 mg lozenges     Target Symptoms: Withdrawal and medication side effects. The following were  rated moderate (3) to severe (4) on TCRS:  · Moderate (3): desire/crave-- Nicotine replacement therapy, withdrawal symptoms   · Severe (4): none    Comments: The patient is smoking 6 cigarettes/day using 14 mg nicotine patches and 2 mg lozenges. The lozenges ere ordered,but he stated he did not get them from the pharmacy. Will check. He is doing good, not smoking at work, but at home will start rearranging the smoking areas, ashtrays, etc.to change the cues. He is gald about his progress, states it has been hard, but is determined to keep going. The patient denies any abnormal behavioral or mental changes at this time.The patient will continue individual sessions and medication monitoring by CTTS. Prescribed medication management will be by practitoner.    Diagnosis: F17.210    Next Visit: 1 week

## 2019-02-20 NOTE — Clinical Note
The patient is smoking 6 cigarettes/day using 14 mg nicotine patches and 2 mg lozenges. The lozenges ere ordered,but he stated he did not get them from the pharmacy. Will check. He is doing good, not smoking at work, but at home will start rearranging the smoking areas, ashtrays, etc.to change the cues. He is gald about his progress, states it has been hard, but is determined to keep going

## 2019-02-27 ENCOUNTER — CLINICAL SUPPORT (OUTPATIENT)
Dept: SMOKING CESSATION | Facility: CLINIC | Age: 64
End: 2019-02-27
Payer: COMMERCIAL

## 2019-02-27 DIAGNOSIS — F17.210 MODERATE SMOKER (20 OR LESS PER DAY): Primary | ICD-10-CM

## 2019-02-27 PROCEDURE — 90853 PR GROUP PSYCHOTHERAPY: ICD-10-PCS | Mod: S$GLB,,,

## 2019-02-27 PROCEDURE — 90853 GROUP PSYCHOTHERAPY: CPT | Mod: S$GLB,,,

## 2019-02-27 NOTE — Clinical Note
The patient is smoking 5 cigarettes/day using 14 mg nicotine patches and 2 mg lozenges. Last prescription of lozenges were the large size, which he cut in half an it still has trouble keeping parked. Called pharmacy only has big ones, but will look to see if can get the minis. Has cut out coffee cigarette, car, will not smoke at the same time as wife so no social smoking. Progressing well an mastering each step before moving on using delay and timing. Working on quit date.

## 2019-02-27 NOTE — PROGRESS NOTES
Smoking Cessation Group Session #4    Site: [facility]  Date:  2/27/2019  Clinical Status of Patient: Outpatient   Length of Service and Code: 90 minutes - 27230   Number in Attendance: 5  Group Activities/Focus of Group:  Sharing last weeks challenges, triggers, and coping activities to remain quit and/ or keep making progress toward cessation, completion of TCRS (Tobacco Cessation Rating Scale) learned addiction model, personal reasons for quitting, medications, goals, quit date.    Specific session focus: developing quit plan    Target symptoms:  withdrawal and medication side effects             The following were rated moderate (3) to severe (4) on TCRS:       Moderate 3: none     Severe 4:   none  Patient's Response to Intervention: The patient is smoking 5 cigarettes/day using 14 mg nicotine patches and 2 mg lozenges. Last prescription of lozenges were the large size, which he cut in half an it still has trouble keeping parked. Called pharmacy only has big ones, but will look to see if can get the minis. Will wait to order. Has cut out coffee cigarette, car, will not smoke at the same time as wife so no social smoking. Progressing well an mastering each step before moving on using delay and timing. Working on quit date.  Progress Toward Goals and Other Mental Status Changes: The patient denies any abnormal behavioral or mental changes at this time.    Interval History: 0    Diagnosis: F17.210  Plan: The patient will continue with group therapy sessions and medication regimen prescribed with management by physician or by the Cessation [facility] Provider. Patient will inform Smoking Cessation Counselor of symptoms as rated high on TCRS.    Return to Clinic: 1 week    Quit Date: TBD   Planned Quit Date: TBD

## 2019-03-11 ENCOUNTER — TELEPHONE (OUTPATIENT)
Dept: FAMILY MEDICINE | Facility: CLINIC | Age: 64
End: 2019-03-11

## 2019-03-11 DIAGNOSIS — Z12.11 SCREENING FOR COLON CANCER: Primary | ICD-10-CM

## 2019-03-11 NOTE — TELEPHONE ENCOUNTER
----- Message from Alina Lou MA sent at 3/11/2019  1:32 PM CDT -----  Contact: [phone]/self  Can you put in a colonoscopy referral?   ----- Message -----  From: Maida Joseph  Sent: 3/11/2019  11:37 AM  To: Ajit COOK Staff    Patient called in returning your call.  He wants to schedule a colonoscopy. Thanks

## 2019-03-12 ENCOUNTER — PATIENT MESSAGE (OUTPATIENT)
Dept: FAMILY MEDICINE | Facility: CLINIC | Age: 64
End: 2019-03-12

## 2019-03-13 ENCOUNTER — CLINICAL SUPPORT (OUTPATIENT)
Dept: SMOKING CESSATION | Facility: CLINIC | Age: 64
End: 2019-03-13
Payer: COMMERCIAL

## 2019-03-13 DIAGNOSIS — F17.210 LIGHT CIGARETTE SMOKER (1-9 CIGS/DAY): Primary | ICD-10-CM

## 2019-03-13 DIAGNOSIS — F17.210 MODERATE SMOKER (20 OR LESS PER DAY): ICD-10-CM

## 2019-03-13 PROCEDURE — 90853 PR GROUP PSYCHOTHERAPY: ICD-10-PCS | Mod: S$GLB,,,

## 2019-03-13 PROCEDURE — 90853 GROUP PSYCHOTHERAPY: CPT | Mod: S$GLB,,,

## 2019-03-13 NOTE — Clinical Note
The CO measurement =  8 ppm which indicates a light smoker.The patient is smoking 6 cigarettes/day using 14 mg nicotine patches and 2 mg nicotine lozenges. He did smoke mor during Mardi gras, but got right back on track to 8 then 6. He is determined to work toward quit. Has made progress on habits, not smoking in car, not smoking with wife, and continuing to reduce the number smoked/day. Discussed boredom and activity - has been doing dancing to exercise. Was congratulated on his progress.

## 2019-03-14 RX ORDER — IBUPROFEN 200 MG
1 TABLET ORAL DAILY
Qty: 28 PATCH | Refills: 0 | Status: SHIPPED | OUTPATIENT
Start: 2019-03-14 | End: 2019-04-17 | Stop reason: SDUPTHER

## 2019-03-14 NOTE — PROGRESS NOTES
Smoking Cessation Group Session #4    Site: [facility]  Date:  3/14/2019  Clinical Status of Patient: Outpatient   Length of Service and Code: 90 minutes - 60949   Number in Attendance: 5  Group Activities/Focus of Group:  Sharing last weeks challenges, triggers, and coping activities to remain quit and/ or keep making progress toward cessation, completion of TCRS (Tobacco Cessation Rating Scale) learned addiction model, personal reasons for quitting, medications, goals, quit date.    Specific session focus: completion of TCRS (Tobacco Cessation Rating Scale) reviewed strategies, habitual behavior, stress, and high risk situations. Introduced stress with addition interventions, SOLVE, relaxation with interventions, nutrition, exercise, weight gain, and the importance of rewarding oneself for accomplishments toward becoming tobacco free. Open discussion of all items with interventions.       Target symptoms:  withdrawal and medication side effects             The following were rated moderate (3) to severe (4) on TCRS:       Moderate 3: desire/crave, - Nicotine replacement therapy, withdrawal symptoms      Severe 4:   none  Patient's Response to Intervention: completion of TCRS (Tobacco Cessation Rating Scale) reviewed strategies, habitual behavior, stress, and high risk situations. Introduced stress with addition interventions, SOLVE, relaxation with interventions, nutrition, exercise, weight gain, and the importance of rewarding oneself for accomplishments toward becoming tobacco free. Open discussion of all items with interventions.   Progress Toward Goals and Other Mental Status Changes: The CO measurement =  8 ppm which indicates a light smoker.The patient is smoking 6 cigarettes/day using 14 mg nicotine patches and 2 mg nicotine lozenges. He did smoke mor during Mardi gras, but got right back on track to 8 then 6. He is determined to work toward quit. Has made progress on habits, not smoking in car, not smoking with  wife, and continuing to reduce the number smoked/day. Discussed boredom and activity - has been doing dancing to exercise. Was congratulated on his progress.   Interval History: o    Diagnosis: The patient is smoking  cigarettes/day using   Plan: The patient will continue with group therapy sessions and medication regimen prescribed with management by physician or by the [facility]ation [facility] Provider. Patient will inform Smoking Cessation Counselor of symptoms as rated high on TCRS.    Return to Clinic: 1 week    Quit Date: TBD   Planned Quit Date: TBD

## 2019-03-19 ENCOUNTER — OFFICE VISIT (OUTPATIENT)
Dept: FAMILY MEDICINE | Facility: CLINIC | Age: 64
End: 2019-03-19
Payer: COMMERCIAL

## 2019-03-19 VITALS
BODY MASS INDEX: 31.37 KG/M2 | DIASTOLIC BLOOD PRESSURE: 78 MMHG | WEIGHT: 219.13 LBS | HEIGHT: 70 IN | HEART RATE: 67 BPM | OXYGEN SATURATION: 97 % | SYSTOLIC BLOOD PRESSURE: 128 MMHG

## 2019-03-19 DIAGNOSIS — L91.8 INFLAMED SKIN TAG: ICD-10-CM

## 2019-03-19 DIAGNOSIS — Z12.11 COLON CANCER SCREENING: ICD-10-CM

## 2019-03-19 DIAGNOSIS — D48.5 NEOPLASM OF UNCERTAIN BEHAVIOR OF SKIN: Primary | ICD-10-CM

## 2019-03-19 DIAGNOSIS — F17.200 SMOKER: ICD-10-CM

## 2019-03-19 DIAGNOSIS — Z79.82 LONG-TERM USE OF ASPIRIN THERAPY: ICD-10-CM

## 2019-03-19 PROCEDURE — 99999 PR PBB SHADOW E&M-EST. PATIENT-LVL IV: ICD-10-PCS | Mod: PBBFAC,,, | Performed by: FAMILY MEDICINE

## 2019-03-19 PROCEDURE — 99999 PR PBB SHADOW E&M-EST. PATIENT-LVL IV: CPT | Mod: PBBFAC,,, | Performed by: FAMILY MEDICINE

## 2019-03-19 PROCEDURE — 99213 PR OFFICE/OUTPT VISIT, EST, LEVL III, 20-29 MIN: ICD-10-PCS | Mod: 25,S$GLB,, | Performed by: FAMILY MEDICINE

## 2019-03-19 PROCEDURE — 88305 TISSUE SPECIMEN TO PATHOLOGY, INTERNAL MEDICINE: ICD-10-PCS | Mod: 26,,, | Performed by: PATHOLOGY

## 2019-03-19 PROCEDURE — 3008F PR BODY MASS INDEX (BMI) DOCUMENTED: ICD-10-PCS | Mod: CPTII,S$GLB,, | Performed by: FAMILY MEDICINE

## 2019-03-19 PROCEDURE — 3008F BODY MASS INDEX DOCD: CPT | Mod: CPTII,S$GLB,, | Performed by: FAMILY MEDICINE

## 2019-03-19 PROCEDURE — 88305 TISSUE EXAM BY PATHOLOGIST: CPT | Performed by: PATHOLOGY

## 2019-03-19 PROCEDURE — 99213 OFFICE O/P EST LOW 20 MIN: CPT | Mod: 25,S$GLB,, | Performed by: FAMILY MEDICINE

## 2019-03-19 NOTE — PROGRESS NOTES
Office Visit    Patient Name: Stef Pool    : 1955  MRN: [id number]    Subjective:  Stef is a 63 y.o. male who presents today for:    Follow-up (skin tag)    Patient of mine with pre diabetes, controlled hypertension, chronic use of aspirin therapy, here today for removal of several facial skin tags.  He has no personal or family history of melanoma.  He has not had any skin cancers.  The reason why he would like the skin lesions removed is that they are either growing/becoming more inflamed/bother some and some of them are interfering with wearing of his glasses for reading and or sunglasses.    He has 2 larger skin lesions, 1 on the right lower cheek and 1 on the left chin that are becoming progressively more pedunculated, more inflamed, more susceptible to being cut with shaving, and are developing some overlying inflammation/scale that is not healing.  He would like these removed and biopsied if indicated.    He has 2 additional skin tags around his left eye better on the upper eyelid and interfering with wearing his glasses.  These become progressively irritated and inflamed each time he attempts to use his glasses on this area.      Past Medical History  Past Medical History:   Diagnosis Date    Anxiety 2015    Combined hyperlipidemia 2012    HEARING LOSS     Major depressive disorder, recurrent, moderate 2012    Obesity (BMI 30.0-34.9) 2015    diet and exercise changes discussed     BARAK on CPAP 2015    uses at least 4 hours nightly     Prediabetes 2016    making dietary and exercise changes and staring Metformin 500 mg XR daily.  recheck in 3 months    Smoker 2013       Past Surgical History  History reviewed. No pertinent surgical history.    Family History  Family History   Problem Relation Age of Onset    Heart disease Mother        Social History  Social History     Socioeconomic History    Marital status:      Spouse name: Not on file  "   Number of children: Not on file    Years of education: Not on file    Highest education level: Not on file   Social Needs    Financial resource strain: Not on file    Food insecurity - worry: Not on file    Food insecurity - inability: Not on file    Transportation needs - medical: Not on file    Transportation needs - non-medical: Not on file   Occupational History    Not on file   Tobacco Use    Smoking status: Current Every Day Smoker    Smokeless tobacco: Never Used   Substance and Sexual Activity    Alcohol use: Yes    Drug use: No    Sexual activity: Not on file   Other Topics Concern    Not on file   Social History Narrative    Not on file       Current Medications  Medications reviewed and updated.     Allergies   Review of patient's allergies indicates:  No Known Allergies    Review of Systems (Pertinent positives)  Review of Systems   Skin:        Skin lesions   Hematological: Bruises/bleeds easily (easy bleeding due to aspirin therapy ).       /78   Pulse 67   Ht 5' 10" (1.778 m)   Wt 99.4 kg (219 lb 2.2 oz)   SpO2 97%   BMI 31.44 kg/m²     Physical Exam   Constitutional: He is oriented to person, place, and time. He appears well-developed and well-nourished. No distress.   Pulmonary/Chest: Effort normal.   Musculoskeletal: He exhibits no edema.   Neurological: He is alert and oriented to person, place, and time.   Skin:        Psychiatric: He has a normal mood and affect.   Vitals reviewed.        Assessment/Plan:  Stef Pool is a 63 y.o. male who presents today for :    Stef was seen today for follow-up.    Diagnoses and all orders for this visit:    Neoplasm of uncertain behavior of skin  Comments:  The 2 lesions with more nonhealing characteristics were sent to pathology for further evaluation.  The other lesions of the left upper eyelid were trimmed off  Orders:  -     Tissue Specimen To Pathology, Internal Medicine    Inflamed skin tag  -     Tissue Specimen To " Pathology, Internal Medicine    Long-term use of aspirin therapy    Colon cancer screening  -     Case request GI: COLONOSCOPY    Smoker  Comments:  Making progress with smoking cessation            ICD-10-CM ICD-9-CM    1. Neoplasm of uncertain behavior of skin D48.5 238.2 Tissue Specimen To Pathology, Internal Medicine    The 2 lesions with more nonhealing characteristics were sent to pathology for further evaluation.  The other lesions of the left upper eyelid were trimmed off   2. Inflamed skin tag L91.8 701.9 Tissue Specimen To Pathology, Internal Medicine     686.9    3. Long-term use of aspirin therapy Z79.82 V58.66    4. Colon cancer screening Z12.11 V76.51 Case request GI: COLONOSCOPY   5. Smoker F17.200 305.1     Making progress with smoking cessation     Advised on normal skin care for the face, okay to wash face as per usual.  Keep lesions moisturizer with a facial moisturizer to improve healing over the long-term.    There are no Patient Instructions on file for this visit.      Follow-up for return as needed for new concerns.

## 2019-03-20 ENCOUNTER — TELEPHONE (OUTPATIENT)
Dept: GASTROENTEROLOGY | Facility: CLINIC | Age: 64
End: 2019-03-20

## 2019-03-20 ENCOUNTER — TELEPHONE (OUTPATIENT)
Dept: SMOKING CESSATION | Facility: CLINIC | Age: 64
End: 2019-03-20

## 2019-04-10 ENCOUNTER — CLINICAL SUPPORT (OUTPATIENT)
Dept: SMOKING CESSATION | Facility: CLINIC | Age: 64
End: 2019-04-10
Payer: COMMERCIAL

## 2019-04-10 DIAGNOSIS — F17.210 LIGHT CIGARETTE SMOKER (1-9 CIGS/DAY): Primary | ICD-10-CM

## 2019-04-10 PROCEDURE — 90853 GROUP PSYCHOTHERAPY: CPT | Mod: S$GLB,,,

## 2019-04-10 PROCEDURE — 90853 PR GROUP PSYCHOTHERAPY: ICD-10-PCS | Mod: S$GLB,,,

## 2019-04-11 NOTE — PROGRESS NOTES
Smoking Cessation Group Session #1    Site: [facility]  Date:  4/10/2019  Clinical Status of Patient: Outpatient   Length of Service and Code: 90 minutes - 42226   Number in Attendance: 3  Group Activities/Focus of Group:  Sharing last weeks challenges, triggers, and coping activities to remain quit and/ or keep making progress toward cessation, completion of TCRS (Tobacco Cessation Rating Scale) learned addiction model, personal reasons for quitting, medications, goals, quit date.    Specific session focus: completion of TCRS (Tobacco Cessation Rating Scale) reviewed strategies, controlling environment, cues, triggers, new goals set. Introduced high risk situations with preparation interventions, caffeine similarities with withdrawal issues of habit and nicotine, Alcohol, Understanding urges, cravings, stress and relaxation. Open discussion with intervention discussion.    Target symptoms:  withdrawal and medication side effects             The following were rated moderate (3) to severe (4) on TCRS:       Moderate 3: desire/crave/ anger - Nicotine replacement therapy, withdrawal symptoms      Severe 4:   none  Patient's Response to Intervention: The patient is smoking 4 cigarettes/day using 14 mg nicotine patch and 2 mg nicotine lozenges about 3-6/day. He states he does well at work, but still smokes more in the evening than morning. Discussed with spouse who is in the program about making a quit plan together, because they care enabling each other. They are to decide quit date, are going to quit together, and confirm no more cigarettes brought into the house. They agreed it was time and will make plan.  Progress Toward Goals and Other Mental Status Changes: The patient denies any abnormal behavioral or mental changes at this time.  Interval History:     Diagnosis: Z72.0  Plan: The patient will continue with group therapy sessions and medication regimen prescribed with management by physician or by the Cessation Clinic  Provider. Patient will inform Smoking Cessation Counselor of symptoms as rated high on TCRS.    Return to Clinic: 1 week    Quit Date: TBD   Planned Quit Date: TBD

## 2019-04-17 ENCOUNTER — CLINICAL SUPPORT (OUTPATIENT)
Dept: SMOKING CESSATION | Facility: CLINIC | Age: 64
End: 2019-04-17
Payer: COMMERCIAL

## 2019-04-17 DIAGNOSIS — F17.210 LIGHT CIGARETTE SMOKER (1-9 CIGS/DAY): Primary | ICD-10-CM

## 2019-04-17 DIAGNOSIS — F17.210 MODERATE SMOKER (20 OR LESS PER DAY): ICD-10-CM

## 2019-04-17 PROCEDURE — 90853 PR GROUP PSYCHOTHERAPY: ICD-10-PCS | Mod: S$GLB,,,

## 2019-04-17 PROCEDURE — 90853 GROUP PSYCHOTHERAPY: CPT | Mod: S$GLB,,,

## 2019-04-17 RX ORDER — IBUPROFEN 200 MG
1 TABLET ORAL DAILY
Qty: 28 PATCH | Refills: 0 | Status: SHIPPED | OUTPATIENT
Start: 2019-04-17 | End: 2019-05-22 | Stop reason: SDUPTHER

## 2019-04-17 NOTE — PROGRESS NOTES
Smoking Cessation Group Session #1    Site: [facility]  Date:  4/17/2019  Clinical Status of Patient: Outpatient   Length of Service and Code: 60 minutes - 79608   Number in Attendance: 3  Group Activities/Focus of Group:  Sharing last weeks challenges, triggers, and coping activities to remain quit and/ or keep making progress toward cessation, completion of TCRS (Tobacco Cessation Rating Scale) learned addiction model, personal reasons for quitting, medications, goals, quit date.    Specific session focus: completion of TCRS (Tobacco Cessation Rating Scale) reviewed strategies, habitual behavior, high risks situations, understanding urges and cravings, stress and relaxation with open discussion and additional interventions, Introduced lapses, relapses, understanding them and analyzing the situation of a lapse, conflict issues that may be linked to a lapse.       Target symptoms:  withdrawal and medication side effects             The following were rated moderate (3) to severe (4) on TCRS:       Moderate 3: none     Severe 4:   none  Patient's Response to Intervention: The patient is smoking 3 cigarettes/day using 14 mg nicotine patch.He has been working on  how to handle stress, boredom, and has been relaxing more. He is working on cravings at work by talking to people to distract his cravings. He had a good week-end an stuck to his limit.  Discussed cravings and when to expect more of problem with them such as 3 months and ways to keep from giving in. Reordered patches today.      Progress Toward Goals and Other Mental Status Changes: The patient denies any abnormal behavioral or mental changes at this time.    Interval History:     Diagnosis: Z72.0  Plan: The patient will continue with group therapy sessions and medication regimen prescribed with management by physician or by the [facility] Provider. Patient will inform Smoking Cessation Counselor of symptoms as rated high on TCRS.    Return to Clinic: 1  week    Quit Date: TBD   Planned Quit Date: TBD

## 2019-04-24 ENCOUNTER — CLINICAL SUPPORT (OUTPATIENT)
Dept: SMOKING CESSATION | Facility: CLINIC | Age: 64
End: 2019-04-24
Payer: COMMERCIAL

## 2019-04-24 DIAGNOSIS — F17.210 LIGHT CIGARETTE SMOKER (1-9 CIGS/DAY): Primary | ICD-10-CM

## 2019-04-24 PROCEDURE — 90853 GROUP PSYCHOTHERAPY: CPT | Mod: S$GLB,,,

## 2019-04-24 PROCEDURE — 90853 PR GROUP PSYCHOTHERAPY: ICD-10-PCS | Mod: S$GLB,,,

## 2019-04-24 NOTE — PROGRESS NOTES
Smoking Cessation Group Session #3    Site: [facility]  Date:  4/24/2019  Clinical Status of Patient: Outpatient   Length of Service and Code: 60 minutes - 11750   Number in Attendance: 4  Group Activities/Focus of Group:  Sharing last weeks challenges, triggers, and coping activities to remain quit and/ or keep making progress toward cessation, completion of TCRS (Tobacco Cessation Rating Scale) learned addiction model, personal reasons for quitting, medications, goals, quit date.    Specific session focus: completion of TCRS (Tobacco Cessation Rating Scale) reviewed strategies, habitual behavior, stress, and high risk situations. Introduced stress with addition interventions, SOLVE, relaxation with interventions, nutrition, exercise, weight gain, and the importance of rewarding oneself for accomplishments toward becoming tobacco free. Open discussion of all items with interventions.       Target symptoms:  withdrawal and medication side effects             The following were rated moderate (3) to severe (4) on TCRS:       Moderate 3: none     Severe 4:   none  Patient's Response to Intervention: The patient is smoking  3 cigarettes/day using 14 mg and is now trying the 7 mg nicotine patch. The patient is working on quit goals of reducing  Cigarette/week till her quits. He has been very successful with his plan so far.  Progress Toward Goals and Other Mental Status Changes: The patient denies any abnormal behavioral or mental changes at this time.    Interval History: 0    Diagnosis: Z72.0  Plan: The patient will continue with group therapy sessions and medication regimen prescribed with management by physician or by the [facility] Provider. Patient will inform Smoking Cessation Counselor of symptoms as rated high on TCRS.    Return to Clinic: 1 week    Quit Date: TBD   Planned Quit Date: TBD

## 2019-04-24 NOTE — Clinical Note
The patient is smoking  3 cigarettes/day using 14 mg and is now trying the 7 mg nicotine patch. The patient is working on quit goals of reducing  Cigarette/week till her quits. He has been very successful with his plan so far.

## 2019-05-01 ENCOUNTER — CLINICAL SUPPORT (OUTPATIENT)
Dept: SMOKING CESSATION | Facility: CLINIC | Age: 64
End: 2019-05-01
Payer: COMMERCIAL

## 2019-05-01 DIAGNOSIS — F17.210 LIGHT CIGARETTE SMOKER (1-9 CIGS/DAY): Primary | ICD-10-CM

## 2019-05-01 PROCEDURE — 90853 GROUP PSYCHOTHERAPY: CPT | Mod: S$GLB,,,

## 2019-05-01 PROCEDURE — 90853 PR GROUP PSYCHOTHERAPY: ICD-10-PCS | Mod: S$GLB,,,

## 2019-05-01 NOTE — Clinical Note
The patient is smoking 2 cigarettes/day using  7 mg nicotine patch and using 5 lozenges/day.  He is smoking [employer] cigarettes which has an unknown nicotine content. He is concerned because he is going on vacation, to a casino, flying and may be drinking. Discussed drinking slow, not reaching tip point, using patches when going around smokers and taking 2 steps back if around a smoker. He feels he may be ok and does not want to break his pattern. If does well, will try 1 cigarette,then will do dry runs. He is very pleased with his progress and was congratulated.

## 2019-05-01 NOTE — PROGRESS NOTES
Smoking Cessation Group Session #4    Site: [facility]  Date:  5/1/2019  Clinical Status of Patient: Outpatient   Length of Service and Code: 60 minutes - 37188   Number in Attendance: 2  Group Activities/Focus of Group:  Sharing last weeks challenges, triggers, and coping activities to remain quit and/ or keep making progress toward cessation, completion of TCRS (Tobacco Cessation Rating Scale) learned addiction model, personal reasons for quitting, medications, goals, quit date.    Specific session focus: completion of TCRS (Tobacco Cessation Rating Scale) reviewed strategies, habitual behavior, stress, and high risk situations. Introduced stress with addition interventions, SOLVE, relaxation with interventions, nutrition, exercise, weight gain, and the importance of rewarding oneself for accomplishments toward becoming tobacco free. Open discussion of all items with interventions.       Target symptoms:  withdrawal and medication side effects             The following were rated moderate (3) to severe (4) on TCRS:       Moderate 3: none     Severe 4:   none  Patient's Response to Intervention: The patient is smoking 2 cigarettes/day using  7 mg nicotine patch and using 5 lozenges/day.  He is smoking [employer] cigarettes which has an unknown nicotine content. He is concerned because he is going on vacation, to [place] [employer], flying and may be drinking. Discussed drinking slow, not reaching tip point, using patches when going around smokers and taking 2 steps back if around a smoker. He feels he may be ok and does not want to break his pattern. If does well, will try 1 cigarette,then will do dry runs. He is very pleased with his progress and was congratulated.   Progress Toward Goals and Other Mental Status Changes: The patient denies any abnormal behavioral or mental changes at this time.    Interval History:     Diagnosis: Z72.0  Plan: The patient will continue with group therapy sessions and medication regimen prescribed  with management by physician or by the [facility]essation [facility] Provider. Patient will inform Smoking Cessation Counselor of symptoms as rated high on TCRS.    Return to Clinic: 1 week    Quit Date: TBD   Planned Quit Date: TBD

## 2019-05-09 ENCOUNTER — TELEPHONE (OUTPATIENT)
Dept: GASTROENTEROLOGY | Facility: CLINIC | Age: 64
End: 2019-05-09

## 2019-05-10 ENCOUNTER — TELEPHONE (OUTPATIENT)
Dept: SMOKING CESSATION | Facility: CLINIC | Age: 64
End: 2019-05-10

## 2019-05-10 ENCOUNTER — CLINICAL SUPPORT (OUTPATIENT)
Dept: SMOKING CESSATION | Facility: CLINIC | Age: 64
End: 2019-05-10
Payer: COMMERCIAL

## 2019-05-10 DIAGNOSIS — F17.200 NICOTINE DEPENDENCE: Primary | ICD-10-CM

## 2019-05-10 PROCEDURE — 99407 BEHAV CHNG SMOKING > 10 MIN: CPT | Mod: S$GLB,,,

## 2019-05-10 PROCEDURE — 99407 PR TOBACCO USE CESSATION INTENSIVE >10 MINUTES: ICD-10-PCS | Mod: S$GLB,,,

## 2019-05-10 NOTE — PROGRESS NOTES
Successful contact with patients spouse (Mayra) regarding tobacco cessation quit #1. She states, the patient currently smoke four cigarettes per day and he continue to follow up with [facility] via group therapy. Pt's schedule to follow up with [facility] on 5/15/2019. Informed spuose of his benefit status, future telephone follow ups, and contact information. Will update the tobacco cessation smart form for 3-6 months on quit #1.

## 2019-05-15 ENCOUNTER — CLINICAL SUPPORT (OUTPATIENT)
Dept: SMOKING CESSATION | Facility: CLINIC | Age: 64
End: 2019-05-15
Payer: COMMERCIAL

## 2019-05-15 DIAGNOSIS — F17.210 LIGHT CIGARETTE SMOKER (1-9 CIGS/DAY): Primary | ICD-10-CM

## 2019-05-15 PROCEDURE — 90853 GROUP PSYCHOTHERAPY: CPT | Mod: S$GLB,,,

## 2019-05-15 PROCEDURE — 90853 PR GROUP PSYCHOTHERAPY: ICD-10-PCS | Mod: S$GLB,,,

## 2019-05-15 NOTE — PROGRESS NOTES
Smoking Cessation Group Session #1    Site: [facility]  Date:  5/15/2019  Clinical Status of Patient: Outpatient   Length of Service and Code: 90 minutes - [id number]   Number in Attendance: 4  Group Activities/Focus of Group:  Sharing last weeks challenges, triggers, and coping activities to remain quit and/ or keep making progress toward cessation, completion of TCRS (Tobacco Cessation Rating Scale) learned addiction model, personal reasons for quitting, medications, goals, quit date.    Specific session focus: completion of TCRS (Tobacco Cessation Rating Scale) reviewed strategies, habitual behavior, stress, and high risk situations. Introduced stress with addition interventions, SOLVE, relaxation with interventions, nutrition, exercise, weight gain, and the importance of rewarding oneself for accomplishments toward becoming tobacco free. Open discussion of all items with interventions.       Target symptoms:  withdrawal and medication side effects             The following were rated moderate (3) to severe (4) on TCRS:       Moderate 3: none     Severe 4:   Desire/crave, increased appetite -- Nicotine replacement therapy, withdrawal symptoms     Patient's Response to Intervention: The patient is smoking 8 cigarettes/day using 14 mg nicotine patch. He lapsed when on vacation, but has slowly gotten back to 7. He is very frustrated and discouraged with the lapse that he is not confident at all he can quit. Discussed lapses, causes, and what to do in the future. He was in a very high risk situation with other smokers consistently present. Discussed not beating yourself up, understand it happened and go on. Will try dry runs to start getting use to not smoking.   Progress Toward Goals and Other Mental Status Changes: The patient denies any abnormal behavioral or mental changes at this time.  Interval History:     Diagnosis: Z72.0  Plan: The patient will continue with group therapy sessions and medication regimen prescribed  with management by physician or by the [facility]ation [facility] Provider. Patient will inform Smoking Cessation Counselor of symptoms as rated high on TCRS.    Return to Clinic: 1 week    Quit Date: TBD   Planned Quit Date: TBD

## 2019-05-15 NOTE — Clinical Note
The patient is smoking 8 cigarettes/day using 14 mg nicotine patch. He lapsed when on vacation, but has slowly gotten back to 7. He is very frustrated and discouraged with the lapse that he is not confident at all he can quit. Discussed lapses, causes, and what to do in the future. He was in a very high risk situation with other smokers consistently present. Discussed not beating yourself up, understand it happened and go on. Will try dry runs to start getting use to not smoking.

## 2019-05-17 ENCOUNTER — TELEPHONE (OUTPATIENT)
Dept: GASTROENTEROLOGY | Facility: CLINIC | Age: 64
End: 2019-05-17

## 2019-05-22 ENCOUNTER — CLINICAL SUPPORT (OUTPATIENT)
Dept: SMOKING CESSATION | Facility: CLINIC | Age: 64
End: 2019-05-22
Payer: COMMERCIAL

## 2019-05-22 DIAGNOSIS — F17.210 LIGHT CIGARETTE SMOKER (1-9 CIGS/DAY): Primary | ICD-10-CM

## 2019-05-22 DIAGNOSIS — F17.210 MODERATE SMOKER (20 OR LESS PER DAY): ICD-10-CM

## 2019-05-22 PROCEDURE — 90853 PR GROUP PSYCHOTHERAPY: ICD-10-PCS | Mod: S$GLB,,,

## 2019-05-22 PROCEDURE — 90853 GROUP PSYCHOTHERAPY: CPT | Mod: S$GLB,,,

## 2019-05-22 RX ORDER — DM/P-EPHED/ACETAMINOPH/DOXYLAM 30-7.5/3
2 LIQUID (ML) ORAL
Qty: 216 LOZENGE | Refills: 0 | Status: SHIPPED | OUTPATIENT
Start: 2019-05-22 | End: 2019-06-19 | Stop reason: SDUPTHER

## 2019-05-22 RX ORDER — IBUPROFEN 200 MG
1 TABLET ORAL DAILY
Qty: 28 PATCH | Refills: 0 | Status: SHIPPED | OUTPATIENT
Start: 2019-05-22 | End: 2019-06-19 | Stop reason: SDUPTHER

## 2019-05-22 NOTE — PROGRESS NOTES
Smoking Cessation Group Session #2    Site: [facility]  Date:  5/22/2019  Clinical Status of Patient: Outpatient   Length of Service and Code: 90 minutes - 08430   Number in Attendance: 4  Group Activities/Focus of Group:  Sharing last weeks challenges, triggers, and coping activities to remain quit and/ or keep making progress toward cessation, completion of TCRS (Tobacco Cessation Rating Scale) learned addiction model, personal reasons for quitting, medications, goals, quit date.    Specific session focus: completion of TCRS (Tobacco Cessation Rating Scale) reviewed strategies, controlling environment, cues, triggers, new goals set. Introduced high risk situations with preparation interventions, caffeine similarities with withdrawal issues of habit and nicotine, Alcohol, Understanding urges, cravings, stress and relaxation. Open discussion with intervention discussion.      Target symptoms:  withdrawal and medication side effects             The following were rated moderate (3) to severe (4) on TCRS:       Moderate 3: none     Severe 4:   Increased appetite - Nicotine replacement therapy, withdrawal symptoms   Patient's Response to Intervention: The patient is smoking 5 cigarettes/day using 14 mg nicotine patch and 2 mg nicotine lozenges. He is getting back on track from vacation where he was smoking 10 cigarettes/day. He is back to 5/day and has already cut out the morning cigarette by using time delay. Encouraged to set quit date and to begin to work on quit plan. Patches and lozenges reordered today.   Progress Toward Goals and Other Mental Status Changes: The patient denies any abnormal behavioral or mental changes at this time.  Interval History:     Diagnosis: Z72.0  Plan: The patient will continue with group therapy sessions and medication regimen prescribed with management by physician or by the [facility]ation [facility] Provider. Patient will inform Smoking Cessation Counselor of symptoms as rated high on  TCRS.    Return to Clinic: 1 week    Quit Date: TBD   Planned Quit Date: TBD

## 2019-05-28 DIAGNOSIS — F41.9 ANXIETY: ICD-10-CM

## 2019-05-28 DIAGNOSIS — Z86.010 HISTORY OF COLON POLYPS: ICD-10-CM

## 2019-05-28 DIAGNOSIS — Z80.0 FAMILY HISTORY OF COLON CANCER REQUIRING SCREENING COLONOSCOPY: Primary | ICD-10-CM

## 2019-05-28 RX ORDER — SODIUM, POTASSIUM,MAG SULFATES 17.5-3.13G
1 SOLUTION, RECONSTITUTED, ORAL ORAL DAILY
Qty: 354 ML | Refills: 0 | Status: SHIPPED | OUTPATIENT
Start: 2019-05-28 | End: 2019-06-06

## 2019-05-28 NOTE — TELEPHONE ENCOUNTER
Colonoscopy Referral   Referring Physician:    Date: 7/12/2019  Reason for Referral: History of colon Polyp  Family History of: Colon Cancer  Colon polyp: None   Relationship/Age of Onset: None  Colon cancer: YES  Relationship/Age of Onset: Brother   Hemoccults Done:  NO  Iron deficient: NO  On Blood Thinner: NO  Valvular heart disease/valve replacement:NO    Anemia Present: NO  On NSAID: YES  Lung disease: NO  Kidney disease: NO  Hx of polyps:YES  Hx of colon cancer: NO  Previous colon evalations:YES       When: 2012  Where:   Pertinent symptoms:     Patient was scheduled for colonoscopy on 7/12/2019 with Dr. Reyes at [facility]. Suprep instructions were reviewed with patient.

## 2019-05-28 NOTE — TELEPHONE ENCOUNTER
SUPREP Instructions    You are scheduled for a colonoscopy with Dr. Reyes on 7/12/2019 at [facility]  To ensure that your test is accurate and complete, you MUST follow these instructions listed below.  If you have any questions, please call our office at [phone].  Plan on being at the hospital for your procedure for 3-4 hours.    1.  Follow a CLEAR LIQUID DIET for the entire day before your scheduled colonoscopy.  This means no solid food the entire day starting when you wake.  You may have as much of the clear liquids as you want throughout the day.   CLEAR LIQUID DIET:   - Avoid Red, Orange, Purple, and/or Blue food coloring   - NO DAIRY   - You can have:  Coffee with sugar (no creamer), tea, water, soda, apple or white grape juice, chicken or beef broth/bouillon (no meat, noodles, or veggies), green/yellow popsicles, green/yellow Jell-O, lemonade.    2.  AT 5 pm the evening before your colonoscopy, POUR ONE (1) BOTTLE OF SUPREP INTO THE MIXING CONTAINER, PROVIDED INSIDE THE BOX.  ADD WATER TO THE LINE ON THE CONTAINER AND MIX IT WELL.  DRINK THE ENTIRE CONTAINER AND THEN DRINK TWO (2) MORE CONTAINERS OF WATER OVER THE NEXT 1 HOUR.  This is sometimes easier to drink if this solution is cold, so you can mix the solution a few hours ahead of time and place in the refrigerator prior to drinking.  You have to drink the solution within 24 hours of mixing it.  Do NOT put this solution over ice.  It IS ok to drink with a straw.    3.  The endoscopy department will call you 2 days before your colonoscopy to tell you the exact time to arrive, AND to tell you the exact time to drink the 2nd portion of your prep (which will be FIVE HOURS BEFORE YOUR ARRIVAL TIME).  At this time given to you, POUR ONE (1) BOTTLE OF SUPREP INTO THE MIXING CONTAINER, PROVIDED INSIDE THE BOX.  ADD WATER TO THE LINE ON THE CONTAINER AND MIX IT WELL.  DRINK THE ENTIRE CONTAINER AND THEN DRINK TWO (2) MORE CONTAINERS OF WATER OVER THE  NEXT 1 HOUR.  This is sometimes easier to drink if this solution is cold, so you can mix the solution a few hours ahead of time and place in the refrigerator prior to drinking.  You have to drink the solution within 24 hours of mixing it.  Do NOT put this solution over ice.  It IS ok to drink with a straw. Once this is complete, you may not have ANYTHING else by mouth!    4.  You must have someone with you to DRIVE YOU HOME since you will be receiving IV sedation for the colonoscopy.    5.  It is ok to take your heart, blood pressure, and seizure medications in the morning of your test with a SIP of water.  Hold other medications until after your procedure.  Do NOT have anything else to eat or drink the morning of your colonoscopy.  It is ok to brush your teeth.    6.  If you are on blood thinners THAT YOU HAVE BEEN INSTRUCTED TO HOLD BY YOUR DOCTOR FOR THIS PROCEDURE, then do NOT take this the morning of your colonoscopy.  Do NOT stop these medications on your own, they must be approved to be held by your doctor.  Your colonoscopy can NOT be done if you are on these medications.  Examples of blood thinners include: Coumadin, Aggrenox, Plavix, Pradaxa, Reapro, Pletal, Xarelto, Ticagrelor, Brilinta, Eliquis, and high dose aspirin (325 mg).  You do not have to stop baby aspirin 81 mg.    7.  IF YOU ARE DIABETIC:  NO INSULIN OR ORAL MEDICATIONS THE MORNING OF THE COLONOSCOPY.  TAKE ONLY HALF THE DOSE OF YOUR INSULIN THE DAY BEFORE THE COLONOSCOPY.  DO NOT TAKE ANY ORAL DIABETIC MEDICATIONS THE DAY BEFORE THE COLONOSCOPY.  IF YOU ARE AN INSULIN DEPENDENT DIABETIC WITH UNSTABLE BLOOD SUGARDS, NOTIFY YOUR PRIMARY CARE PHYSICIAN FOR INSTRUCTIONS.

## 2019-05-29 ENCOUNTER — CLINICAL SUPPORT (OUTPATIENT)
Dept: SMOKING CESSATION | Facility: CLINIC | Age: 64
End: 2019-05-29
Payer: COMMERCIAL

## 2019-05-29 DIAGNOSIS — F17.210 LIGHT CIGARETTE SMOKER (1-9 CIGS/DAY): Primary | ICD-10-CM

## 2019-05-29 PROCEDURE — 90853 PR GROUP PSYCHOTHERAPY: ICD-10-PCS | Mod: S$GLB,,,

## 2019-05-29 PROCEDURE — 90853 GROUP PSYCHOTHERAPY: CPT | Mod: S$GLB,,,

## 2019-05-29 RX ORDER — ALPRAZOLAM 0.5 MG/1
TABLET ORAL
Qty: 30 TABLET | Refills: 5 | Status: SHIPPED | OUTPATIENT
Start: 2019-05-29 | End: 2019-12-13 | Stop reason: SDUPTHER

## 2019-05-29 NOTE — PROGRESS NOTES
Smoking Cessation Group Session #3    Site: [facility]  Date:  5/29/2019  Clinical Status of Patient: Outpatient   Length of Service and Code: 90 minutes - 55918   Number in Attendance: 3  Group Activities/Focus of Group:  Sharing last weeks challenges, triggers, and coping activities to remain quit and/ or keep making progress toward cessation, completion of TCRS (Tobacco Cessation Rating Scale) learned addiction model, personal reasons for quitting, medications, goals, quit date.    Specific session focus: completion of TCRS (Tobacco Cessation Rating Scale) reviewed strategies, cues, triggers, high risk situations, lapses, relapses, diet, exercise, stress, relaxation, sleep, habitual behavior, and life style changes.      Target symptoms:  withdrawal and medication side effects             The following were rated moderate (3) to severe (4) on TCRS:       Moderate 3: none     Severe 4:   none  Patient's Response to Intervention: The patient is smoking 5 cigarettes/day using 14 mg nicotine patch and 2 mg lozenges. He has trouble with the patches staying on when working outside and found he smoked more those days. He could keep the patch on if he was wearing socks. Suggested if he is outside he use the lozenges every hour if needed instead of the patch. Discussed setting quit date with spouse and set limits. Appears to be enabling each other and are actually smoking more, Discussed copping styles, what may be expected with high risk situations.   Progress Toward Goals and Other Mental Status Changes: The patient denies any abnormal behavioral or mental changes at this time.    Interval History:     Diagnosis: Z72.0  Plan: The patient will continue with group therapy sessions and medication regimen prescribed with management by physician or by the [facility] Provider. Patient will inform Smoking Cessation Counselor of symptoms as rated high on TCRS.    Return to Clinic: 2 weeks    Quit Date: TBD   Planned Quit Date:  TBD

## 2019-05-29 NOTE — Clinical Note
The patient is smoking 5 cigarettes/day using 14 mg nicotine patch and 2 mg lozenges. He has trouble with the patches staying on when working outside and found he smoked more those days. He could keep the patch on if he was wearing socks. Suggested if he is outside he use the lozenges every hour if needed instead of the patch. Discussed setting quit date with spouse and set limits. Appears to be enabling each other and are actually smoking more, Discussed copping styles, what may be expected with high risk situations.

## 2019-06-05 ENCOUNTER — TELEPHONE (OUTPATIENT)
Dept: SMOKING CESSATION | Facility: CLINIC | Age: 64
End: 2019-06-05

## 2019-06-05 NOTE — TELEPHONE ENCOUNTER
1st attempt missed appt. Working . Is going on vacation next week and will not be at group. Will call if needs medicaiton.

## 2019-06-19 ENCOUNTER — CLINICAL SUPPORT (OUTPATIENT)
Dept: SMOKING CESSATION | Facility: CLINIC | Age: 64
End: 2019-06-19
Payer: COMMERCIAL

## 2019-06-19 DIAGNOSIS — F17.210 LIGHT CIGARETTE SMOKER (1-9 CIGS/DAY): Primary | ICD-10-CM

## 2019-06-19 DIAGNOSIS — F17.210 MODERATE SMOKER (20 OR LESS PER DAY): ICD-10-CM

## 2019-06-19 PROCEDURE — 99403 PR PREVENT COUNSEL,INDIV,45 MIN: ICD-10-PCS | Mod: S$GLB,,,

## 2019-06-19 PROCEDURE — 99403 PREV MED CNSL INDIV APPRX 45: CPT | Mod: S$GLB,,,

## 2019-06-19 RX ORDER — DM/P-EPHED/ACETAMINOPH/DOXYLAM 30-7.5/3
2 LIQUID (ML) ORAL
Qty: 216 LOZENGE | Refills: 0 | Status: SHIPPED | OUTPATIENT
Start: 2019-06-19 | End: 2019-07-24 | Stop reason: SDUPTHER

## 2019-06-19 RX ORDER — IBUPROFEN 200 MG
1 TABLET ORAL DAILY
Qty: 28 PATCH | Refills: 0 | Status: SHIPPED | OUTPATIENT
Start: 2019-06-19 | End: 2019-07-24

## 2019-06-19 NOTE — Clinical Note
The patient is smoking 4 cigarettes/day using. The CO measurement =  6  Ppm which indicates a light smoker. The patient is getting back on track after the first vacation and did well on the last one. He was not around as many smokers and keep up with what he smoked better. Will cut down to 3 next week. States he is feeling better about cutting down.

## 2019-06-19 NOTE — PROGRESS NOTES
Individual Follow-Up Form    6/19/2019    Quit Date: TBD    Clinical Status of Patient: Outpatient    Length of Service: 45 minutes    Continuing Medication: yes  Patches    Other Medications: 2 mg nicotine lozenges     Target Symptoms: Withdrawal and medication side effects. The following were  rated moderate (3) to severe (4) on TCRS:  · Moderate (3): none  · Severe (4): increased appetite - - Nicotine replacement therapy, withdrawal symptoms     Comments: The patient is smoking 4 cigarettes/day using 14 mg nicotine patches and 2 mg nicotine lozenges . The CO measurement =  6  Ppm which indicates a light smoker. The patient is getting back on track after the first vacation and did well on the last one. He was not around as many smokers and keep up with what he smoked better. Will cut down to 3 next week. States he is feeling better about cutting down. completion of TCRS (Tobacco Cessation Rating Scale) reviewed strategies, cues, triggers, high risk situations, lapses, relapses, diet, exercise, stress, relaxation, sleep, habitual behavior, and life style changes.The patient will continue individual sessions and medication monitoring by CTTS. Prescribed medication management will be by practitoner.The patient denies any abnormal behavioral or mental changes at this time.reordered 14 mg nicotine patches      Diagnosis: F17.210    Next Visit: 1 week

## 2019-06-26 ENCOUNTER — CLINICAL SUPPORT (OUTPATIENT)
Dept: SMOKING CESSATION | Facility: CLINIC | Age: 64
End: 2019-06-26
Payer: COMMERCIAL

## 2019-06-26 DIAGNOSIS — F17.210 LIGHT CIGARETTE SMOKER (1-9 CIGS/DAY): Primary | ICD-10-CM

## 2019-06-26 PROCEDURE — 90853 PR GROUP PSYCHOTHERAPY: ICD-10-PCS | Mod: S$GLB,,,

## 2019-06-26 PROCEDURE — 90853 GROUP PSYCHOTHERAPY: CPT | Mod: S$GLB,,,

## 2019-06-26 NOTE — PROGRESS NOTES
Smoking Cessation Group Session #4    Site: [facility]  Date:  6/26/2019  Clinical Status of Patient: Outpatient   Length of Service and Code: 90 minutes - 45005   Number in Attendance: 3  Group Activities/Focus of Group:  Sharing last weeks challenges, triggers, and coping activities to remain quit and/ or keep making progress toward cessation, completion of TCRS (Tobacco Cessation Rating Scale) learned addiction model, personal reasons for quitting, medications, goals, quit date.    Specific session focus: completion of TCRS (Tobacco Cessation Rating Scale) reviewed strategies, habitual behavior, high risks situations, understanding urges and cravings, stress and relaxation with open discussion and additional interventions, Introduced lapses, relapses, understanding them and analyzing the situation of a lapse, conflict issues that may be linked to a lapse.       Target symptoms:  withdrawal and medication side effects             The following were rated moderate (3) to severe (4) on TCRS:       Moderate 3: none     Severe 4:   Increased appetite -- Nicotine replacement therapy, withdrawal symptoms     Patient's Response to Intervention: The patient is smoking 3 cigarettes/day using 14 mg nicotine patch and 2 mg nicotine lozenges about 5/day. The CO measurement = 6 ppm which indicates a light smoker. He was congratulated for getting back on track from his vacation. Discussed high risk situations and solutions. He states he is going to continue to reduce and hopefully start dry runs.   Progress Toward Goals and Other Mental Status Changes: The patient denies any abnormal behavioral or mental changes at this time.    Interval History:     Diagnosis: Z72.0  Plan: The patient will continue with group therapy sessions and medication regimen prescribed with management by physician or by the Cessation Clinic Provider. Patient will inform Smoking Cessation Counselor of symptoms as rated high on TCRS.    Return to Clinic: 2  weeks    Quit Date: TBD   Planned Quit Date: TBD

## 2019-06-26 NOTE — Clinical Note
he patient is smoking 3 cigarettes/day using 14 mg nicotine patch and 2 mg nicotine lozenges about 5/day. The CO measurement = 6 ppm which indicates a light smoker. He was congratulated for getting back on track from his vacation. Discussed high risk situations and solutions. He states he is going to continue to reduce and hopefully start dry runs.

## 2019-07-10 ENCOUNTER — TELEPHONE (OUTPATIENT)
Dept: ENDOSCOPY | Facility: HOSPITAL | Age: 64
End: 2019-07-10

## 2019-07-10 ENCOUNTER — CLINICAL SUPPORT (OUTPATIENT)
Dept: SMOKING CESSATION | Facility: CLINIC | Age: 64
End: 2019-07-10
Payer: COMMERCIAL

## 2019-07-10 DIAGNOSIS — F17.210 LIGHT CIGARETTE SMOKER (1-9 CIGS/DAY): Primary | ICD-10-CM

## 2019-07-10 PROCEDURE — 90853 PR GROUP PSYCHOTHERAPY: ICD-10-PCS | Mod: S$GLB,,,

## 2019-07-10 PROCEDURE — 90853 GROUP PSYCHOTHERAPY: CPT | Mod: S$GLB,,,

## 2019-07-10 NOTE — TELEPHONE ENCOUNTER
Spoke with patient about arrival time @. 0730    Prep instructions reviewed: the day before the procedure, follow a clear liquid diet all day, then start the first 1/2 of prep at 5pm and take 2nd 1/2 of prep @.0130  Pt must be completely NPO when prep completed @. 0330             Medications: Do not take Insulin or oral diabetic medications the day of the procedure.  Take as prescribed: heart, seizure and blood pressure medication in the morning with a sip of water (less than an ounce).  Take any breathing medications and bring inhalers to hospital with you Leave all valuables and jewelry at home.     Wear comfortable clothes to procedure to change into hospital gown You cannot drive for 24 hours after your procedure because you will receive sedation for your procedure to make you comfortable.  A ride must be provided at discharge.

## 2019-07-10 NOTE — Clinical Note
The patient is smoking 1-2  cigarettes/day using 7 mg nicotine patch and 2 mg nicotine lozenges PRN. He states he is having more cravings, but could be caused by the reduction from 14 mg nicotine patch to 7 mg nicotine patch. He has made a schedule of reduction and is determined to stick to his timeline. He does realize his spouse is not ready to quit and this may be a hindrance.. Will begin dry runs next week.

## 2019-07-12 ENCOUNTER — ANESTHESIA EVENT (OUTPATIENT)
Dept: ENDOSCOPY | Facility: HOSPITAL | Age: 64
End: 2019-07-12
Payer: COMMERCIAL

## 2019-07-12 ENCOUNTER — HOSPITAL ENCOUNTER (OUTPATIENT)
Facility: HOSPITAL | Age: 64
Discharge: HOME | End: 2019-07-12
Attending: INTERNAL MEDICINE | Admitting: INTERNAL MEDICINE
Payer: COMMERCIAL

## 2019-07-12 ENCOUNTER — ANESTHESIA (OUTPATIENT)
Dept: ENDOSCOPY | Facility: HOSPITAL | Age: 64
End: 2019-07-12
Payer: COMMERCIAL

## 2019-07-12 VITALS
OXYGEN SATURATION: 95 % | BODY MASS INDEX: 30.8 KG/M2 | HEART RATE: 62 BPM | DIASTOLIC BLOOD PRESSURE: 67 MMHG | WEIGHT: 220 LBS | SYSTOLIC BLOOD PRESSURE: 119 MMHG | RESPIRATION RATE: 19 BRPM | HEIGHT: 71 IN | TEMPERATURE: 99 F

## 2019-07-12 DIAGNOSIS — Z12.11 SCREENING FOR MALIGNANT NEOPLASM OF COLON: ICD-10-CM

## 2019-07-12 DIAGNOSIS — Z86.010 HISTORY OF COLON POLYPS: ICD-10-CM

## 2019-07-12 LAB
GLUCOSE SERPL-MCNC: 106 MG/DL (ref 70–110)
POCT GLUCOSE: 106 MG/DL (ref 70–110)

## 2019-07-12 PROCEDURE — 25000003 PHARM REV CODE 250: Performed by: INTERNAL MEDICINE

## 2019-07-12 PROCEDURE — 99203 OFFICE O/P NEW LOW 30 MIN: CPT | Mod: 25,,, | Performed by: INTERNAL MEDICINE

## 2019-07-12 PROCEDURE — 88305 TISSUE EXAM BY PATHOLOGIST: CPT | Performed by: PATHOLOGY

## 2019-07-12 PROCEDURE — 99203 PR OFFICE/OUTPT VISIT, NEW, LEVL III, 30-44 MIN: ICD-10-PCS | Mod: 25,,, | Performed by: INTERNAL MEDICINE

## 2019-07-12 PROCEDURE — 63600175 PHARM REV CODE 636 W HCPCS: Performed by: NURSE ANESTHETIST, CERTIFIED REGISTERED

## 2019-07-12 PROCEDURE — 45385 COLONOSCOPY W/LESION REMOVAL: CPT | Mod: 33,,, | Performed by: INTERNAL MEDICINE

## 2019-07-12 PROCEDURE — 45385 PR COLONOSCOPY,REMV LESN,SNARE: ICD-10-PCS | Mod: 33,,, | Performed by: INTERNAL MEDICINE

## 2019-07-12 PROCEDURE — 37000008 HC ANESTHESIA 1ST 15 MINUTES: Performed by: INTERNAL MEDICINE

## 2019-07-12 PROCEDURE — 88305 TISSUE EXAM BY PATHOLOGIST: CPT | Mod: 26,,, | Performed by: PATHOLOGY

## 2019-07-12 PROCEDURE — 45382 COLONOSCOPY W/CONTROL BLEED: CPT | Performed by: INTERNAL MEDICINE

## 2019-07-12 PROCEDURE — 88305 TISSUE SPECIMEN TO PATHOLOGY - SURGERY: ICD-10-PCS | Mod: 26,,, | Performed by: PATHOLOGY

## 2019-07-12 PROCEDURE — 45385 COLONOSCOPY W/LESION REMOVAL: CPT | Performed by: INTERNAL MEDICINE

## 2019-07-12 PROCEDURE — 27201089 HC SNARE, DISP (ANY): Performed by: INTERNAL MEDICINE

## 2019-07-12 PROCEDURE — 27200997: Performed by: INTERNAL MEDICINE

## 2019-07-12 PROCEDURE — 37000009 HC ANESTHESIA EA ADD 15 MINS: Performed by: INTERNAL MEDICINE

## 2019-07-12 PROCEDURE — 25000003 PHARM REV CODE 250: Performed by: NURSE ANESTHETIST, CERTIFIED REGISTERED

## 2019-07-12 RX ORDER — EPHEDRINE SULFATE 50 MG/ML
INJECTION, SOLUTION INTRAVENOUS
Status: DISCONTINUED | OUTPATIENT
Start: 2019-07-12 | End: 2019-07-12

## 2019-07-12 RX ORDER — SODIUM CHLORIDE 0.9 % (FLUSH) 0.9 %
10 SYRINGE (ML) INJECTION
Status: DISCONTINUED | OUTPATIENT
Start: 2019-07-12 | End: 2019-07-12 | Stop reason: HOSPADM

## 2019-07-12 RX ORDER — PROPOFOL 10 MG/ML
VIAL (ML) INTRAVENOUS CONTINUOUS PRN
Status: DISCONTINUED | OUTPATIENT
Start: 2019-07-12 | End: 2019-07-12

## 2019-07-12 RX ORDER — SODIUM CHLORIDE 9 MG/ML
INJECTION, SOLUTION INTRAVENOUS CONTINUOUS
Status: DISCONTINUED | OUTPATIENT
Start: 2019-07-12 | End: 2019-07-12 | Stop reason: HOSPADM

## 2019-07-12 RX ORDER — PROPOFOL 10 MG/ML
VIAL (ML) INTRAVENOUS
Status: DISCONTINUED | OUTPATIENT
Start: 2019-07-12 | End: 2019-07-12

## 2019-07-12 RX ORDER — LIDOCAINE HCL/PF 100 MG/5ML
SYRINGE (ML) INTRAVENOUS
Status: DISCONTINUED | OUTPATIENT
Start: 2019-07-12 | End: 2019-07-12

## 2019-07-12 RX ADMIN — EPHEDRINE SULFATE 10 MG: 50 INJECTION, SOLUTION INTRAMUSCULAR; INTRAVENOUS; SUBCUTANEOUS at 08:07

## 2019-07-12 RX ADMIN — LIDOCAINE HYDROCHLORIDE 50 MG: 20 INJECTION, SOLUTION INTRAVENOUS at 08:07

## 2019-07-12 RX ADMIN — PROPOFOL 150 MCG/KG/MIN: 10 INJECTION, EMULSION INTRAVENOUS at 08:07

## 2019-07-12 RX ADMIN — SODIUM CHLORIDE: 0.9 INJECTION, SOLUTION INTRAVENOUS at 07:07

## 2019-07-12 RX ADMIN — PROPOFOL 50 MG: 10 INJECTION, EMULSION INTRAVENOUS at 08:07

## 2019-07-12 NOTE — [employer] PATIENT INSTRUCTIONS
Discharge Summary/Instructions after an Endoscopic Procedure  Patient Name: Stef Pool  Patient MRN: [id number]  Patient YOB: 1955 Friday, July 12, 2019  Amrit Reyes MD  RESTRICTIONS:  During your procedure today, you received medications for sedation.  These   medications may affect your judgment, balance and coordination.  Therefore,   for 24 hours, you have the following restrictions:   - DO NOT drive a car, operate machinery, make legal/financial decisions,   sign important papers or drink alcohol.    ACTIVITY:  Today: no heavy lifting, straining or running due to procedural   sedation/anesthesia.  The following day: return to full activity including work.  DIET:  Eat and drink normally unless instructed otherwise.     TREATMENT FOR COMMON SIDE EFFECTS:  - Mild abdominal pain, nausea, belching, bloating or excessive gas:  rest,   eat lightly and use a heating pad.  - Sore Throat: treat with throat lozenges and/or gargle with warm salt   water.  - Because air was used during the procedure, expelling large amounts of air   from your rectum or belching is normal.  - If a bowel prep was taken, you may not have a bowel movement for 1-3 days.    This is normal.  SYMPTOMS TO WATCH FOR AND REPORT TO YOUR PHYSICIAN:  1. Abdominal pain or bloating, other than gas cramps.  2. Chest pain.  3. Back pain.  4. Signs of infection such as: chills or fever occurring within 24 hours   after the procedure.  5. Rectal bleeding, which would show as bright red, maroon, or black stools.   (A tablespoon of blood from the rectum is not serious, especially if   hemorrhoids are present.)  6. Vomiting.  7. Weakness or dizziness.  GO DIRECTLY TO THE NEAREST EMERGENCY ROOM IF YOU HAVE ANY OF THE FOLLOWING:      Difficulty breathing              Chills and/or fever over 101 F   Persistent vomiting and/or vomiting blood   Severe abdominal pain   Severe chest pain   Black, tarry stools   Bleeding- more than one tablespoon   Any  other symptom or condition that you feel may need urgent attention  Your doctor recommends these additional instructions:  If any biopsies were taken, your doctors clinic will contact you in 1 to 2   weeks with any results.  - Discharge patient to home (via wheelchair).   - Patient has a contact number available for emergencies.  The signs and   symptoms of potential delayed complications were discussed with the   patient.  Return to normal activities tomorrow.  Written discharge   instructions were provided to the patient.   - Resume previous diet.   - Continue present medications.   - Await pathology results.   - Repeat colonoscopy in 3 years for surveillance.  For questions, problems or results please call your physician - Amrit Reyes MD at Work:  [phone].  EMERGENCY PHONE NUMBER: [phone],  LAB RESULTS: [phone]  IF A COMPLICATION OR EMERGENCY SITUATION ARISES AND YOU ARE UNABLE TO REACH   YOUR PHYSICIAN - GO DIRECTLY TO THE EMERGENCY ROOM.  Amrit Reyes MD  7/12/2019 9:03:24 AM  This report has been verified and signed electronically.  [employer]

## 2019-07-12 NOTE — TRANSFER OF CARE
"Anesthesia Transfer of Care Note    Patient: Stef Pool    Procedure(s) Performed: Procedure(s) (LRB):  COLONOSCOPY (N/A)    Patient location: GI    Anesthesia Type: MAC    Transport from OR: Transported from OR on room air with adequate spontaneous ventilation    Post pain: adequate analgesia    Post assessment: no apparent anesthetic complications and tolerated procedure well    Post vital signs: stable    Level of consciousness: awake, alert and oriented    Nausea/Vomiting: no nausea/vomiting    Complications: none    Transfer of care protocol was followed      Last vitals:   Visit Vitals  BP (!) 121/59   Pulse 79   Temp 36.6 °C (97.8 °F)   Resp 16   Ht 5' 11" (1.803 m)   Wt 99.8 kg (220 lb)   SpO2 95%   BMI 30.68 kg/m²     "

## 2019-07-12 NOTE — H&P
[facility]  Gastroenterology  H&P    Patient Name: Stef Pool  MRN: [id number]  Admission Date: 7/12/2019  Code Status: Full Code    Attending Provider: Amrit Reyes MD   Primary Care Physician: Caitlyn Fong MD  Principal Problem:<principal problem not specified>    Subjective:     History of Present Illness: This is a 64yo male who presents for colon cancer screening with a history of colon polyps. Records reviewed. Last colonoscopy was done in 2012 for a history of polyps with one 7mm polyp from the transverse colon, path reviewed noting an adenoma. No current GI symptoms. Family present to give additional history.     Past Medical History:   Diagnosis Date    Anxiety 11/20/2015    Combined hyperlipidemia 11/23/2012    HEARING LOSS     Major depressive disorder, recurrent, moderate 11/23/2012    Obesity (BMI 30.0-34.9) 11/20/2015    diet and exercise changes discussed     BARAK on CPAP 11/20/2015    uses at least 4 hours nightly     Prediabetes 11/23/2016    making dietary and exercise changes and staring Metformin 500 mg XR daily.  recheck in 3 months    Smoker 5/2/2013       History reviewed. No pertinent surgical history.    Review of patient's allergies indicates:  No Known Allergies  Family History     Problem Relation (Age of Onset)    Heart disease Mother        Tobacco Use    Smoking status: Current Every Day Smoker    Smokeless tobacco: Never Used   Substance and Sexual Activity    Alcohol use: Yes    Drug use: No    Sexual activity: Not on file     Review of Systems   Constitutional: Negative for chills and fever.   HENT: Negative for postnasal drip and trouble swallowing.    Eyes: Negative for pain and visual disturbance.   Respiratory: Negative for cough, choking and shortness of breath.    Cardiovascular: Negative for chest pain and leg swelling.   Gastrointestinal: Negative for abdominal distention, abdominal pain, anal bleeding, blood in stool,  constipation, diarrhea, nausea, rectal pain and vomiting.   Endocrine: Negative for cold intolerance and heat intolerance.   Genitourinary: Negative for difficulty urinating and hematuria.   Neurological: Negative for dizziness and numbness.   Hematological: Negative for adenopathy. Does not bruise/bleed easily.   Psychiatric/Behavioral: Negative for agitation and confusion.     Objective:     Vital Signs (Most Recent):  Temp: 97.8 °F (36.6 °C) (07/12/19 0743)  Pulse: 79 (07/12/19 0743)  Resp: 16 (07/12/19 0743)  BP: (!) 121/59 (07/12/19 0743)  SpO2: 95 % (07/12/19 0743) Vital Signs (24h Range):  Temp:  [97.8 °F (36.6 °C)] 97.8 °F (36.6 °C)  Pulse:  [79] 79  Resp:  [16] 16  SpO2:  [95 %] 95 %  BP: (121)/(59) 121/59     Weight: 99.8 kg (220 lb) (07/12/19 0743)  Body mass index is 30.68 kg/m².    No intake or output data in the 24 hours ending 07/12/19 0800    Lines/Drains/Airways     Peripheral Intravenous Line                 Peripheral IV - Single Lumen 07/12/19 0747 20 G Right Hand less than 1 day                Physical Exam   Constitutional: He is oriented to person, place, and time. He appears well-developed and well-nourished. No distress.   HENT:   Head: Normocephalic and atraumatic.   Eyes: Conjunctivae are normal. No scleral icterus.   Neck: No tracheal deviation present. No thyromegaly present.   Cardiovascular: Normal rate, regular rhythm and normal heart sounds. Exam reveals no gallop and no friction rub.   No murmur heard.  Pulmonary/Chest: Effort normal and breath sounds normal. He has no wheezes. He has no rales.   Abdominal: Soft. Bowel sounds are normal. He exhibits no distension. There is no tenderness. There is no rebound and no guarding.   Musculoskeletal: Normal range of motion. He exhibits no edema or tenderness.   Neurological: He is alert and oriented to person, place, and time.   Skin: He is not diaphoretic.   Psychiatric: He has a normal mood and affect. His behavior is normal.   Nursing  note and vitals reviewed.      Significant Labs: reviewed        Assessment/Plan:     Active Diagnoses:    Diagnosis Date Noted POA    Screening for malignant neoplasm of colon [Z12.11] 07/12/2019 Not Applicable      Problems Resolved During this Admission:     History of colon polyps  Plan. Colonoscopy      Amrit Reyes MD  Gastroenterology  [facility]

## 2019-07-12 NOTE — ANESTHESIA PREPROCEDURE EVALUATION
07/12/2019  Stef Pool is a 63 y.o., male for colonoscopy under MAC    Past Medical History:   Diagnosis Date    Anxiety 11/20/2015    Combined hyperlipidemia 11/23/2012    HEARING LOSS     Major depressive disorder, recurrent, moderate 11/23/2012    Obesity (BMI 30.0-34.9) 11/20/2015    diet and exercise changes discussed     BARAK on CPAP 11/20/2015    uses at least 4 hours nightly     Prediabetes 11/23/2016    making dietary and exercise changes and staring Metformin 500 mg XR daily.  recheck in 3 months    Smoker 5/2/2013         Anesthesia Evaluation    I have reviewed the Patient Summary Reports.    I have reviewed the Nursing Notes.   I have reviewed the Medications.     Review of Systems  Social:  Smoker        Physical Exam  General:  Obesity    Airway/Jaw/Neck:  Airway Findings: Mallampati: II      Chest/Lungs:  Chest/Lungs Clear    Heart/Vascular:  Heart Findings: Normal            Anesthesia Plan  Type of Anesthesia, risks & benefits discussed:  Anesthesia Type:  MAC  Patient's Preference:   Intra-op Monitoring Plan:   Intra-op Monitoring Plan Comments:   Post Op Pain Control Plan:   Post Op Pain Control Plan Comments:   Induction:    Beta Blocker:  Patient is not currently on a Beta-Blocker (No further documentation required).       Informed Consent: Patient understands risks and agrees with Anesthesia plan.  Questions answered. Anesthesia consent signed with patient.  ASA Score: 3     Day of Surgery Review of History & Physical:            Ready For Surgery From Anesthesia Perspective.

## 2019-07-12 NOTE — ANESTHESIA POSTPROCEDURE EVALUATION
Anesthesia Post Evaluation    Patient: Stef Pool    Procedure(s) Performed: Procedure(s) (LRB):  COLONOSCOPY (N/A)    Final Anesthesia Type: MAC  Patient location during evaluation: GI PACU  Patient participation: Yes- Able to Participate  Level of consciousness: awake and alert and oriented  Post-procedure vital signs: reviewed and stable  Pain management: adequate  Airway patency: patent  PONV status at discharge: No PONV  Anesthetic complications: no      Cardiovascular status: blood pressure returned to baseline and hemodynamically stable  Respiratory status: unassisted, spontaneous ventilation and room air  Hydration status: euvolemic  Follow-up not needed.          Vitals Value Taken Time   /59 7/12/2019  7:43 AM   Temp 36.6 °C (97.8 °F) 7/12/2019  7:43 AM   Pulse 79 7/12/2019  7:43 AM   Resp 16 7/12/2019  7:43 AM   SpO2 95 % 7/12/2019  7:43 AM         No case tracking events are documented in the log.      Pain/Jonathan Score: No data recorded

## 2019-07-12 NOTE — PLAN OF CARE
Pt. And wife spoke with Dr. Reyes about results and follow up care.Dischsrge instructions given and explained.

## 2019-07-13 NOTE — PROGRESS NOTES
Smoking Cessation Group Session #3    Site: [facility]  Date:  7/12/2019  Clinical Status of Patient: Outpatient   Length of Service and Code: 90 minutes - 97290   Number in Attendance: 2  Group Activities/Focus of Group:  Sharing last weeks challenges, triggers, and coping activities to remain quit and/ or keep making progress toward cessation, completion of TCRS (Tobacco Cessation Rating Scale) learned addiction model, personal reasons for quitting, medications, goals, quit date.    Specific session focus: completion of TCRS (Tobacco Cessation Rating Scale) reviewed strategies, habitual behavior, high risks situations, understanding urges and cravings, stress and relaxation with open discussion and additional interventions, Introduced lapses, relapses, understanding them and analyzing the situation of a lapse, conflict issues that may be linked to a lapse.       Target symptoms:  withdrawal and medication side effects             The following were rated moderate (3) to severe (4) on TCRS:       Moderate 3: none     Severe 4:   none  Patient's Response to Intervention: The patient is smoking 1-2  cigarettes/day using 7 mg nicotine patch and 2 mg nicotine lozenges PRN. He states he is having more cravings, but could be caused by the reduction from 14 mg nicotine patch to 7 mg nicotine patch. He has made a schedule of reduction and is determined to stick to his timeline. He does realize his spouse is not ready to quit and this may be a hindrance.. Will begin dry runs next week.  Progress Toward Goals and Other Mental Status Changes: The patient denies any abnormal behavioral or mental changes at this time.  Interval History:     Diagnosis: Z72.0  Plan: The patient will continue with group therapy sessions and medication regimen prescribed with management by physician or by the Cessation Clinic Provider. Patient will inform Smoking Cessation Counselor of symptoms as rated high on TCRS.    Return to Clinic: 1 week    Quit  Date: TBD   Planned Quit Date: TBD

## 2019-07-17 ENCOUNTER — CLINICAL SUPPORT (OUTPATIENT)
Dept: SMOKING CESSATION | Facility: CLINIC | Age: 64
End: 2019-07-17
Payer: COMMERCIAL

## 2019-07-17 DIAGNOSIS — F17.210 LIGHT CIGARETTE SMOKER (1-9 CIGS/DAY): Primary | ICD-10-CM

## 2019-07-17 PROCEDURE — 99406 PR TOBACCO USE CESSATION INTERMEDIATE 3-10 MINUTES: ICD-10-PCS | Mod: S$GLB,,,

## 2019-07-17 PROCEDURE — 99406 BEHAV CHNG SMOKING 3-10 MIN: CPT | Mod: S$GLB,,,

## 2019-07-17 NOTE — PROGRESS NOTES
Spoke with patient about cancelled apt.on 7/17/19. He stated he was disappointed because he has been smoke free for 2 days. He is very excited and determined not to start back. He did adjust well to the 7 mg nicotine patch after 3 days and will remain on the patch and the 2 mg nicotine lozenges for awhile. He was congratulated on his efforts. He is exposed to other smokers and appears to handle the high risk well. The patient will continue group sessions and medication monitoring by CTTS. Prescribed medication management will be by practitoner.

## 2019-07-24 ENCOUNTER — CLINICAL SUPPORT (OUTPATIENT)
Dept: SMOKING CESSATION | Facility: CLINIC | Age: 64
End: 2019-07-24
Payer: COMMERCIAL

## 2019-07-24 DIAGNOSIS — F17.210 LIGHT CIGARETTE SMOKER (1-9 CIGS/DAY): Primary | ICD-10-CM

## 2019-07-24 DIAGNOSIS — F17.210 MODERATE SMOKER (20 OR LESS PER DAY): ICD-10-CM

## 2019-07-24 PROCEDURE — 90853 GROUP PSYCHOTHERAPY: CPT | Mod: S$GLB,,,

## 2019-07-24 PROCEDURE — 90853 PR GROUP PSYCHOTHERAPY: ICD-10-PCS | Mod: S$GLB,,,

## 2019-07-24 RX ORDER — DM/P-EPHED/ACETAMINOPH/DOXYLAM 30-7.5/3
2 LIQUID (ML) ORAL
Qty: 270 LOZENGE | Refills: 0 | Status: SHIPPED | OUTPATIENT
Start: 2019-07-24 | End: 2020-02-21

## 2019-07-24 RX ORDER — NICOTINE 7MG/24HR
1 PATCH, TRANSDERMAL 24 HOURS TRANSDERMAL DAILY
Qty: 28 PATCH | Refills: 0 | Status: SHIPPED | OUTPATIENT
Start: 2019-07-24 | End: 2020-02-21

## 2019-07-24 NOTE — PROGRESS NOTES
Smoking Cessation Group Session #4    Site: [facility]  Date:  7/24/2019  Clinical Status of Patient: Outpatient   Length of Service and Code: 60 minutes - 55633   Number in Attendance: 2  Group Activities/Focus of Group:  Sharing last weeks challenges, triggers, and coping activities to remain quit and/ or keep making progress toward cessation, completion of TCRS (Tobacco Cessation Rating Scale) learned addiction model, personal reasons for quitting, medications, goals, quit date.    Specific session focus: completion of TCRS (Tobacco Cessation Rating Scale) reviewed strategies, habitual behavior, stress, and high risk situations. Introduced stress with addition interventions, SOLVE, relaxation with interventions, nutrition, exercise, weight gain, and the importance of rewarding oneself for accomplishments toward becoming tobacco free. Open discussion of all items with interventions.       Target symptoms:  withdrawal and medication side effects             The following were rated moderate (3) to severe (4) on TCRS:       Moderate 3: none     Severe 4:   Increased hunger  Patient's Response to Intervention: The patient is smoking 0 cigarettes/day using 7 mg nicotine patch and 2 mg nicotine lozenges PRN. The patient has been smoke free since 7/14/19 and is doing well. He states he is having some cravings, but is doing well. He feels really good about the quit and states he  Had such a hard time quitting, he doubts he will start back. The patient was praised for the accomplishment. Discussed stress and how to handle high risk situations.     Progress Toward Goals and Other Mental Status Changes: The patient denies any abnormal behavioral or mental changes at this time.    Interval History:     Diagnosis: Z72.0  Plan: The patient will continue with group therapy sessions and medication regimen prescribed with management by physician or by the Cessation Clinic Provider. Patient will inform Smoking Cessation Counselor of  symptoms as rated high on TCRS.    Return to Clinic: 1 week    Quit Date: 7/14/19    Planned Quit Date: 7/14/19

## 2019-07-24 NOTE — Clinical Note
The patient is smoking 0 cigarettes/day using 7 mg nicotine patch and 2 mg nicotine lozenges PRN. The patient has been smoke free since 7/14/19 and is doing well. He states he is having some cravings, but is doing well. He feels really good about the quit and states he  Had such a hard time quitting, he doubts he will start back. The patient was praised for the accomplishment. Discussed stress and how to handle high risk situations.

## 2019-07-26 ENCOUNTER — LAB VISIT (OUTPATIENT)
Dept: LAB | Facility: HOSPITAL | Age: 64
End: 2019-07-26
Attending: FAMILY MEDICINE
Payer: COMMERCIAL

## 2019-07-26 DIAGNOSIS — E78.2 COMBINED HYPERLIPIDEMIA: ICD-10-CM

## 2019-07-26 DIAGNOSIS — R73.03 PREDIABETES: ICD-10-CM

## 2019-07-26 DIAGNOSIS — Z79.899 MEDICATION MANAGEMENT: ICD-10-CM

## 2019-07-26 DIAGNOSIS — Z00.00 ROUTINE GENERAL MEDICAL EXAMINATION AT A HEALTH CARE FACILITY: ICD-10-CM

## 2019-07-26 DIAGNOSIS — E55.9 VITAMIN D DEFICIENCY: ICD-10-CM

## 2019-07-26 LAB
25(OH)D3+25(OH)D2 SERPL-MCNC: 25 NG/ML (ref 30–96)
ALBUMIN SERPL BCP-MCNC: 4 G/DL (ref 3.5–5.2)
ALP SERPL-CCNC: 110 U/L (ref 55–135)
ALT SERPL W/O P-5'-P-CCNC: 28 U/L (ref 10–44)
ANION GAP SERPL CALC-SCNC: 9 MMOL/L (ref 8–16)
AST SERPL-CCNC: 22 U/L (ref 10–40)
BILIRUB SERPL-MCNC: 0.4 MG/DL (ref 0.1–1)
BUN SERPL-MCNC: 19 MG/DL (ref 8–23)
CALCIUM SERPL-MCNC: 10 MG/DL (ref 8.7–10.5)
CHLORIDE SERPL-SCNC: 106 MMOL/L (ref 95–110)
CHOLEST SERPL-MCNC: 188 MG/DL (ref 120–199)
CHOLEST/HDLC SERPL: 5.7 {RATIO} (ref 2–5)
CO2 SERPL-SCNC: 27 MMOL/L (ref 23–29)
CREAT SERPL-MCNC: 1.2 MG/DL (ref 0.5–1.4)
EST. GFR  (AFRICAN AMERICAN): >60 ML/MIN/1.73 M^2
EST. GFR  (NON AFRICAN AMERICAN): >60 ML/MIN/1.73 M^2
ESTIMATED AVG GLUCOSE: 131 MG/DL (ref 68–131)
GLUCOSE SERPL-MCNC: 97 MG/DL (ref 70–110)
HBA1C MFR BLD HPLC: 6.2 % (ref 4–5.6)
HDLC SERPL-MCNC: 33 MG/DL (ref 40–75)
HDLC SERPL: 17.6 % (ref 20–50)
LDLC SERPL CALC-MCNC: 100 MG/DL (ref 63–159)
NONHDLC SERPL-MCNC: 155 MG/DL
POTASSIUM SERPL-SCNC: 4.6 MMOL/L (ref 3.5–5.1)
PROT SERPL-MCNC: 6.7 G/DL (ref 6–8.4)
SODIUM SERPL-SCNC: 142 MMOL/L (ref 136–145)
TRIGL SERPL-MCNC: 275 MG/DL (ref 30–150)

## 2019-07-26 PROCEDURE — 80061 LIPID PANEL: CPT

## 2019-07-26 PROCEDURE — 36415 COLL VENOUS BLD VENIPUNCTURE: CPT

## 2019-07-26 PROCEDURE — 80053 COMPREHEN METABOLIC PANEL: CPT

## 2019-07-26 PROCEDURE — 82306 VITAMIN D 25 HYDROXY: CPT

## 2019-07-26 PROCEDURE — 83036 HEMOGLOBIN GLYCOSYLATED A1C: CPT

## 2019-08-09 ENCOUNTER — OFFICE VISIT (OUTPATIENT)
Dept: FAMILY MEDICINE | Facility: CLINIC | Age: 64
End: 2019-08-09
Payer: COMMERCIAL

## 2019-08-09 VITALS
DIASTOLIC BLOOD PRESSURE: 80 MMHG | SYSTOLIC BLOOD PRESSURE: 134 MMHG | BODY MASS INDEX: 32.19 KG/M2 | OXYGEN SATURATION: 97 % | HEIGHT: 71 IN | HEART RATE: 60 BPM | WEIGHT: 229.94 LBS

## 2019-08-09 DIAGNOSIS — Z79.899 MEDICATION MANAGEMENT: ICD-10-CM

## 2019-08-09 DIAGNOSIS — F41.9 ANXIETY: ICD-10-CM

## 2019-08-09 DIAGNOSIS — G47.33 OSA ON CPAP: ICD-10-CM

## 2019-08-09 DIAGNOSIS — N13.8 BPH WITH OBSTRUCTION/LOWER URINARY TRACT SYMPTOMS: ICD-10-CM

## 2019-08-09 DIAGNOSIS — R73.03 PREDIABETES: Primary | ICD-10-CM

## 2019-08-09 DIAGNOSIS — E66.9 OBESITY (BMI 30.0-34.9): ICD-10-CM

## 2019-08-09 DIAGNOSIS — Z87.891 FORMER CIGARETTE SMOKER: ICD-10-CM

## 2019-08-09 DIAGNOSIS — E78.2 COMBINED HYPERLIPIDEMIA: ICD-10-CM

## 2019-08-09 DIAGNOSIS — Z79.82 LONG-TERM USE OF ASPIRIN THERAPY: ICD-10-CM

## 2019-08-09 DIAGNOSIS — F33.1 MAJOR DEPRESSIVE DISORDER, RECURRENT, MODERATE: ICD-10-CM

## 2019-08-09 DIAGNOSIS — Z12.5 ENCOUNTER FOR SCREENING FOR MALIGNANT NEOPLASM OF PROSTATE: ICD-10-CM

## 2019-08-09 DIAGNOSIS — N40.1 BPH WITH OBSTRUCTION/LOWER URINARY TRACT SYMPTOMS: ICD-10-CM

## 2019-08-09 DIAGNOSIS — E55.9 VITAMIN D DEFICIENCY: ICD-10-CM

## 2019-08-09 DIAGNOSIS — Z86.010 HISTORY OF COLON POLYPS: ICD-10-CM

## 2019-08-09 PROCEDURE — 3008F PR BODY MASS INDEX (BMI) DOCUMENTED: ICD-10-PCS | Mod: CPTII,S$GLB,, | Performed by: FAMILY MEDICINE

## 2019-08-09 PROCEDURE — 99999 PR PBB SHADOW E&M-EST. PATIENT-LVL III: CPT | Mod: PBBFAC,,, | Performed by: FAMILY MEDICINE

## 2019-08-09 PROCEDURE — 3008F BODY MASS INDEX DOCD: CPT | Mod: CPTII,S$GLB,, | Performed by: FAMILY MEDICINE

## 2019-08-09 PROCEDURE — 99999 PR PBB SHADOW E&M-EST. PATIENT-LVL III: ICD-10-PCS | Mod: PBBFAC,,, | Performed by: FAMILY MEDICINE

## 2019-08-09 PROCEDURE — 99214 OFFICE O/P EST MOD 30 MIN: CPT | Mod: S$GLB,,, | Performed by: FAMILY MEDICINE

## 2019-08-09 PROCEDURE — 99214 PR OFFICE/OUTPT VISIT, EST, LEVL IV, 30-39 MIN: ICD-10-PCS | Mod: S$GLB,,, | Performed by: FAMILY MEDICINE

## 2019-08-09 RX ORDER — ACETAMINOPHEN 500 MG
1 TABLET ORAL DAILY
Qty: 90 CAPSULE | Refills: 4 | Status: SHIPPED | OUTPATIENT
Start: 2019-08-09 | End: 2020-08-21 | Stop reason: SDUPTHER

## 2019-08-09 RX ORDER — BUPROPION HYDROCHLORIDE 150 MG/1
150 TABLET ORAL DAILY
Qty: 90 TABLET | Refills: 4 | Status: SHIPPED | OUTPATIENT
Start: 2019-08-09 | End: 2020-02-21

## 2019-08-09 NOTE — PROGRESS NOTES
" Office Visit    Patient Name: Stef Pool    : 1955  MRN: [id number]    Subjective:  Stef is a 63 y.o. male who presents today for:    Follow-up (6 month )    64 yo patient of mine seen for annual exam 2019 here for 6 month monitoring of chronic conditions including obesity, prediabetes, BARAK on CPAP, hyperlipidemia (with ongoing triglyceride elevation and low HDL), tobacco abuse (recently quit 19), anxiety and depression (managed w/ Zoloft 100 and Xanax 0.5 prn).     Labs drawn prior to office visit 19 remarkable for Vit D 25(up from 20)/Triglycerides 181-->275, HDL 33, / EGFR/LFTS WNL A1c 6.2 on Metformin 500 mg XR daily.  Prescription weekly vit D supplement- 50,000 IU Q7days.Continuing Lipitor 40 mg daily.  Mood stable on Zoloft with Xanax prn.  Weight is up about 10 lb over last few months with quitting smoking-- eating more.  He is starting back with walking and has noticed a mild anterior lower leg pain on his right side when he is walking quickly.  He also states that the muscles in that area feel very tight.  He is not having any leg weakness or associated back pain. No leg swelling.      He had his colonoscopy 2019 where several polyps were removed-- no dysplasia but recall of 3 yrs advised.     Flomax started 2019 but he has not yet taken it. PSA was 1.5 2019. He is noting frequency and Nocturia    As per most recent smoking cessation note: "The patient is smoking 0 cigarettes/day using 7 mg nicotine patch and 2 mg nicotine lozenges PRN. The patient has been smoke free since 19 and is doing well. He states he is having some cravings, but is doing well. He feels really good about the quit and states he  Had such a hard time quitting, he doubts he will start back."     Past Medical History  Past Medical History:   Diagnosis Date    Anxiety 2015    Combined hyperlipidemia 2012    HEARING LOSS     Major depressive disorder, recurrent, moderate " 11/23/2012    Obesity (BMI 30.0-34.9) 11/20/2015    diet and exercise changes discussed     BARAK on CPAP 11/20/2015    uses at least 4 hours nightly     Prediabetes 11/23/2016    making dietary and exercise changes and staring Metformin 500 mg XR daily.  recheck in 3 months    Smoker 5/2/2013       Past Surgical History  Past Surgical History:   Procedure Laterality Date    COLONOSCOPY N/A 7/12/2019    Performed by Amrit Reyes MD at [facility]       Family History  Family History   Problem Relation Age of Onset    Heart disease Mother        Social History  Social History     Socioeconomic History    Marital status:      Spouse name: Not on file    Number of children: Not on file    Years of education: Not on file    Highest education level: Not on file   Occupational History    Not on file   Social Needs    Financial resource strain: Not on file    Food insecurity:     Worry: Not on file     Inability: Not on file    Transportation needs:     Medical: Not on file     Non-medical: Not on file   Tobacco Use    Smoking status: Current Every Day Smoker    Smokeless tobacco: Never Used   Substance and Sexual Activity    Alcohol use: Yes    Drug use: No    Sexual activity: Not on file   Lifestyle    Physical activity:     Days per week: Not on file     Minutes per session: Not on file    Stress: Not on file   Relationships    Social connections:     Talks on phone: Not on file     Gets together: Not on file     Attends Hoahaoism service: Not on file     Active member of club or organization: Not on file     Attends meetings of clubs or organizations: Not on file     Relationship status: Not on file   Other Topics Concern    Not on file   Social History Narrative    Not on file       Current Medications  Medications reviewed and updated.     Allergies   Review of patient's allergies indicates:  No Known Allergies    Review of Systems (Pertinent positives)  Review of Systems  "  Constitutional: Positive for activity change. Negative for unexpected weight change.   HENT: Negative for hearing loss, rhinorrhea and trouble swallowing.    Eyes: Negative for discharge and visual disturbance.   Respiratory: Negative for chest tightness and wheezing.    Cardiovascular: Negative for chest pain and palpitations.   Gastrointestinal: Negative for blood in stool, constipation, diarrhea and vomiting.   Endocrine: Positive for polyuria. Negative for polydipsia.   Genitourinary: Negative for difficulty urinating, hematuria and urgency.   Musculoskeletal: Negative for arthralgias, joint swelling and neck pain.   Neurological: Negative for weakness and headaches.   Psychiatric/Behavioral: Negative for confusion and dysphoric mood.       /80 (BP Location: Left arm, Patient Position: Sitting)   Pulse 60   Ht 5' 11" (1.803 m)   Wt 104.3 kg (229 lb 15 oz)   SpO2 97%   BMI 32.07 kg/m²     Physical Exam   Constitutional: He is oriented to person, place, and time. He appears well-developed and well-nourished. No distress.   Cardiovascular: Normal rate, regular rhythm and normal heart sounds.   Pulmonary/Chest: Effort normal and breath sounds normal.   Musculoskeletal: He exhibits no edema.   Neurological: He is alert and oriented to person, place, and time.   Psychiatric: He has a normal mood and affect.   Vitals reviewed.        Assessment/Plan:  Stef Pool is a 63 y.o. male who presents today for :    Stef was seen today for follow-up.    Diagnoses and all orders for this visit:    Prediabetes  Comments:  Recent A1c is 6.2 on metformin 500 mg extended release daily, advised on increased attention to diet and exercise given triglyceride elevation, recheck 6 months  Orders:  -     Hemoglobin A1c; Future  -     Comprehensive metabolic panel; Future  -     Lipid panel; Future    Combined hyperlipidemia  Comments:  continue LIPITOR 40 () and advise lifestyle modifications to help with " triglyceride elevation. Mediterranean Diet hand out provided  Orders:  -     Hemoglobin A1c; Future  -     Comprehensive metabolic panel; Future  -     Lipid panel; Future  -     TSH; Future    Vitamin D deficiency  Comments:  Advised to start 2000 international units daily with breakfast with a recheck in 6 months  Orders:  -     Vitamin D; Future  -     cholecalciferol, vitamin D3, (VITAMIN D3) 2,000 unit Cap; Take 1 capsule (2,000 Units total) by mouth once daily.    Obesity (BMI 30.0-34.9)  Comments:  Has gained weight since quitting smoking, advised ongoing attention exercise routine, starting Wellbutrin, attention to healthy diet.    BARAK on CPAP  Comments:  advised on importance of ongiong CPAP compliance and maintenance    Long-term use of aspirin therapy    Medication management  -     Hemoglobin A1c; Future  -     Comprehensive metabolic panel; Future  -     Lipid panel; Future  -     CBC auto differential; Future  -     TSH; Future  -     Vitamin D; Future  -     PSA, Screening; Future    Major depressive disorder, recurrent, moderate  Comments:  Stable on Zoloft 100 mg daily  Orders:  -     buPROPion (WELLBUTRIN XL) 150 MG TB24 tablet; Take 1 tablet (150 mg total) by mouth once daily.    Anxiety  Comments:  Largely stable on Zoloft, occasionally takes Xanax as needed especially around bedtime    History of colon polyps    BPH with obstruction/lower urinary tract symptoms  Comments:  Bladder emptying improved on Flomax 0.8 mg nightly, no adverse effects noted since starting prescription in February 2019  Orders:  -     PSA, Screening; Future    Encounter for screening for malignant neoplasm of prostate  Comments:  Had colonoscopy 07/20/2019, recall of 3 years  Orders:  -     PSA, Screening; Future    Former cigarette smoker  Comments:  Will add Wellbutrin 150 XL q.a.m. to see if this helps curb cravings for both cigarettes and the food that he has been eating more since quitting.  Orders:  -     buPROPion  (WELLBUTRIN XL) 150 MG TB24 tablet; Take 1 tablet (150 mg total) by mouth once daily.            ICD-10-CM ICD-9-CM    1. Prediabetes R73.03 790.29 Hemoglobin A1c      Comprehensive metabolic panel      Lipid panel    Recent A1c is 6.2 on metformin 500 mg extended release daily, advised on increased attention to diet and exercise given triglyceride elevation, recheck 6 months   2. Combined hyperlipidemia E78.2 272.2 Hemoglobin A1c      Comprehensive metabolic panel      Lipid panel      TSH    continue LIPITOR 40 () and advise lifestyle modifications to help with triglyceride elevation. Mediterranean Diet hand out provided   3. Vitamin D deficiency E55.9 268.9 Vitamin D      cholecalciferol, vitamin D3, (VITAMIN D3) 2,000 unit Cap    Advised to start 2000 international units daily with breakfast with a recheck in 6 months   4. Obesity (BMI 30.0-34.9) E66.9 278.00     Has gained weight since quitting smoking, advised ongoing attention exercise routine, starting Wellbutrin, attention to healthy diet.   5. BARAK on CPAP G47.33 327.23     Z99.89 V46.8     advised on importance of ongiong CPAP compliance and maintenance   6. Long-term use of aspirin therapy Z79.82 V58.66    7. Medication management Z79.899 V58.69 Hemoglobin A1c      Comprehensive metabolic panel      Lipid panel      CBC auto differential      TSH      Vitamin D      PSA, Screening   8. Major depressive disorder, recurrent, moderate F33.1 296.32 buPROPion (WELLBUTRIN XL) 150 MG TB24 tablet    Stable on Zoloft 100 mg daily   9. Anxiety F41.9 300.00     Largely stable on Zoloft, occasionally takes Xanax as needed especially around bedtime   10. History of colon polyps Z86.010 V12.72    11. BPH with obstruction/lower urinary tract symptoms N40.1 600.01 PSA, Screening    N13.8 599.69     Bladder emptying improved on Flomax 0.8 mg nightly, no adverse effects noted since starting prescription in February 2019   12. Encounter for screening for malignant  neoplasm of prostate Z12.5 V76.44 PSA, Screening    Had colonoscopy 07/20/2019, recall of 3 years   13. Former cigarette smoker Z87.891 V15.82 buPROPion (WELLBUTRIN XL) 150 MG TB24 tablet    Will add Wellbutrin 150 XL q.a.m. to see if this helps curb cravings for both cigarettes and the food that he has been eating more since quitting.       There are no Patient Instructions on file for this visit.      Follow up in about 6 months (around 2/9/2020) for to follow up on lab results, return as needed for new concerns.

## 2019-12-09 ENCOUNTER — CLINICAL SUPPORT (OUTPATIENT)
Dept: SMOKING CESSATION | Facility: CLINIC | Age: 64
End: 2019-12-09
Payer: COMMERCIAL

## 2019-12-09 DIAGNOSIS — F17.200 NICOTINE DEPENDENCE: Primary | ICD-10-CM

## 2019-12-09 PROCEDURE — 99407 BEHAV CHNG SMOKING > 10 MIN: CPT | Mod: S$GLB,,,

## 2019-12-09 PROCEDURE — 99407 PR TOBACCO USE CESSATION INTENSIVE >10 MINUTES: ICD-10-PCS | Mod: S$GLB,,,

## 2019-12-09 NOTE — PROGRESS NOTES
Spoke with patients wife  today in regard to smoking cessation progress for 12 month phone follow up on quit 1. She states that he is not tobacco free. Patient stated that she and her  had gone through program together and that he is tobacco free currently and she is still smoking. Stated thats would like to think and call back to  scheduled an appointment to return to the program for Quit attempt #2. Informed patient of his and her benefit period, and contact information if any further help or support is needed. Will complete / resolve episode  smart form for Quit attempt #1.

## 2019-12-13 DIAGNOSIS — F41.9 ANXIETY: ICD-10-CM

## 2019-12-13 RX ORDER — ALPRAZOLAM 0.5 MG/1
TABLET ORAL
Qty: 30 TABLET | Refills: 5 | Status: SHIPPED | OUTPATIENT
Start: 2019-12-13 | End: 2020-07-04

## 2020-02-07 ENCOUNTER — LAB VISIT (OUTPATIENT)
Dept: LAB | Facility: HOSPITAL | Age: 65
End: 2020-02-07
Attending: FAMILY MEDICINE
Payer: COMMERCIAL

## 2020-02-07 DIAGNOSIS — N13.8 BPH WITH OBSTRUCTION/LOWER URINARY TRACT SYMPTOMS: ICD-10-CM

## 2020-02-07 DIAGNOSIS — E78.2 COMBINED HYPERLIPIDEMIA: ICD-10-CM

## 2020-02-07 DIAGNOSIS — Z12.5 ENCOUNTER FOR SCREENING FOR MALIGNANT NEOPLASM OF PROSTATE: ICD-10-CM

## 2020-02-07 DIAGNOSIS — N40.1 BPH WITH OBSTRUCTION/LOWER URINARY TRACT SYMPTOMS: ICD-10-CM

## 2020-02-07 DIAGNOSIS — Z79.899 MEDICATION MANAGEMENT: ICD-10-CM

## 2020-02-07 DIAGNOSIS — R73.03 PREDIABETES: ICD-10-CM

## 2020-02-07 DIAGNOSIS — E55.9 VITAMIN D DEFICIENCY: ICD-10-CM

## 2020-02-07 LAB
25(OH)D3+25(OH)D2 SERPL-MCNC: 28 NG/ML (ref 30–96)
ALBUMIN SERPL BCP-MCNC: 3.9 G/DL (ref 3.5–5.2)
ALP SERPL-CCNC: 113 U/L (ref 55–135)
ALT SERPL W/O P-5'-P-CCNC: 23 U/L (ref 10–44)
ANION GAP SERPL CALC-SCNC: 5 MMOL/L (ref 8–16)
AST SERPL-CCNC: 19 U/L (ref 10–40)
BASOPHILS # BLD AUTO: 0.04 K/UL (ref 0–0.2)
BASOPHILS NFR BLD: 0.6 % (ref 0–1.9)
BILIRUB SERPL-MCNC: 0.5 MG/DL (ref 0.1–1)
BUN SERPL-MCNC: 19 MG/DL (ref 8–23)
CALCIUM SERPL-MCNC: 9.3 MG/DL (ref 8.7–10.5)
CHLORIDE SERPL-SCNC: 107 MMOL/L (ref 95–110)
CHOLEST SERPL-MCNC: 158 MG/DL (ref 120–199)
CHOLEST/HDLC SERPL: 4.8 {RATIO} (ref 2–5)
CO2 SERPL-SCNC: 28 MMOL/L (ref 23–29)
COMPLEXED PSA SERPL-MCNC: 2.6 NG/ML (ref 0–4)
CREAT SERPL-MCNC: 1.2 MG/DL (ref 0.5–1.4)
DIFFERENTIAL METHOD: ABNORMAL
EOSINOPHIL # BLD AUTO: 0.4 K/UL (ref 0–0.5)
EOSINOPHIL NFR BLD: 5.5 % (ref 0–8)
ERYTHROCYTE [DISTWIDTH] IN BLOOD BY AUTOMATED COUNT: 13.4 % (ref 11.5–14.5)
EST. GFR  (AFRICAN AMERICAN): >60 ML/MIN/1.73 M^2
EST. GFR  (NON AFRICAN AMERICAN): >60 ML/MIN/1.73 M^2
ESTIMATED AVG GLUCOSE: 128 MG/DL (ref 68–131)
GLUCOSE SERPL-MCNC: 101 MG/DL (ref 70–110)
HBA1C MFR BLD HPLC: 6.1 % (ref 4–5.6)
HCT VFR BLD AUTO: 44.2 % (ref 40–54)
HDLC SERPL-MCNC: 33 MG/DL (ref 40–75)
HDLC SERPL: 20.9 % (ref 20–50)
HGB BLD-MCNC: 14.1 G/DL (ref 14–18)
IMM GRANULOCYTES # BLD AUTO: 0.02 K/UL (ref 0–0.04)
IMM GRANULOCYTES NFR BLD AUTO: 0.3 % (ref 0–0.5)
LDLC SERPL CALC-MCNC: 86.2 MG/DL (ref 63–159)
LYMPHOCYTES # BLD AUTO: 1.6 K/UL (ref 1–4.8)
LYMPHOCYTES NFR BLD: 24.8 % (ref 18–48)
MCH RBC QN AUTO: 28.2 PG (ref 27–31)
MCHC RBC AUTO-ENTMCNC: 31.9 G/DL (ref 32–36)
MCV RBC AUTO: 88 FL (ref 82–98)
MONOCYTES # BLD AUTO: 0.4 K/UL (ref 0.3–1)
MONOCYTES NFR BLD: 6.9 % (ref 4–15)
NEUTROPHILS # BLD AUTO: 3.9 K/UL (ref 1.8–7.7)
NEUTROPHILS NFR BLD: 61.9 % (ref 38–73)
NONHDLC SERPL-MCNC: 125 MG/DL
NRBC BLD-RTO: 0 /100 WBC
PLATELET # BLD AUTO: 160 K/UL (ref 150–350)
PMV BLD AUTO: 11.3 FL (ref 9.2–12.9)
POTASSIUM SERPL-SCNC: 4.2 MMOL/L (ref 3.5–5.1)
PROT SERPL-MCNC: 6.6 G/DL (ref 6–8.4)
RBC # BLD AUTO: 5 M/UL (ref 4.6–6.2)
SODIUM SERPL-SCNC: 140 MMOL/L (ref 136–145)
TRIGL SERPL-MCNC: 194 MG/DL (ref 30–150)
TSH SERPL DL<=0.005 MIU/L-ACNC: 2.3 UIU/ML (ref 0.4–4)
WBC # BLD AUTO: 6.34 K/UL (ref 3.9–12.7)

## 2020-02-07 PROCEDURE — 84443 ASSAY THYROID STIM HORMONE: CPT

## 2020-02-07 PROCEDURE — 83036 HEMOGLOBIN GLYCOSYLATED A1C: CPT

## 2020-02-07 PROCEDURE — 80061 LIPID PANEL: CPT

## 2020-02-07 PROCEDURE — 80053 COMPREHEN METABOLIC PANEL: CPT

## 2020-02-07 PROCEDURE — 85025 COMPLETE CBC W/AUTO DIFF WBC: CPT

## 2020-02-07 PROCEDURE — 84153 ASSAY OF PSA TOTAL: CPT

## 2020-02-07 PROCEDURE — 36415 COLL VENOUS BLD VENIPUNCTURE: CPT

## 2020-02-07 PROCEDURE — 82306 VITAMIN D 25 HYDROXY: CPT

## 2020-02-17 DIAGNOSIS — R39.11 BENIGN PROSTATIC HYPERPLASIA WITH URINARY HESITANCY: ICD-10-CM

## 2020-02-17 DIAGNOSIS — F33.1 MAJOR DEPRESSIVE DISORDER, RECURRENT, MODERATE: ICD-10-CM

## 2020-02-17 DIAGNOSIS — N40.1 BENIGN PROSTATIC HYPERPLASIA WITH URINARY HESITANCY: ICD-10-CM

## 2020-02-17 DIAGNOSIS — E78.2 COMBINED HYPERLIPIDEMIA: ICD-10-CM

## 2020-02-17 DIAGNOSIS — R73.03 PREDIABETES: ICD-10-CM

## 2020-02-17 RX ORDER — METFORMIN HYDROCHLORIDE 500 MG/1
TABLET, EXTENDED RELEASE ORAL
Qty: 90 TABLET | Refills: 4 | Status: SHIPPED | OUTPATIENT
Start: 2020-02-17 | End: 2020-08-21 | Stop reason: SDUPTHER

## 2020-02-17 RX ORDER — TAMSULOSIN HYDROCHLORIDE 0.4 MG/1
CAPSULE ORAL
Qty: 180 CAPSULE | Refills: 4 | Status: SHIPPED | OUTPATIENT
Start: 2020-02-17 | End: 2021-02-21

## 2020-02-17 RX ORDER — ERGOCALCIFEROL 1.25 MG/1
CAPSULE ORAL
Qty: 15 CAPSULE | Refills: 4 | Status: SHIPPED | OUTPATIENT
Start: 2020-02-17 | End: 2021-02-21

## 2020-02-17 RX ORDER — SERTRALINE HYDROCHLORIDE 100 MG/1
TABLET, FILM COATED ORAL
Qty: 90 TABLET | Refills: 4 | Status: SHIPPED | OUTPATIENT
Start: 2020-02-17 | End: 2021-02-21

## 2020-02-17 RX ORDER — ATORVASTATIN CALCIUM 40 MG/1
TABLET, FILM COATED ORAL
Qty: 90 TABLET | Refills: 4 | Status: SHIPPED | OUTPATIENT
Start: 2020-02-17 | End: 2021-02-21

## 2020-02-20 RX ORDER — HYDROCODONE BITARTRATE AND ACETAMINOPHEN 5; 325 MG/1; MG/1
TABLET ORAL
COMMUNITY
Start: 2019-11-13 | End: 2020-02-21

## 2020-02-21 ENCOUNTER — OFFICE VISIT (OUTPATIENT)
Dept: FAMILY MEDICINE | Facility: CLINIC | Age: 65
End: 2020-02-21
Payer: COMMERCIAL

## 2020-02-21 VITALS
DIASTOLIC BLOOD PRESSURE: 86 MMHG | HEIGHT: 71 IN | BODY MASS INDEX: 33.36 KG/M2 | SYSTOLIC BLOOD PRESSURE: 138 MMHG | WEIGHT: 238.31 LBS | HEART RATE: 74 BPM | OXYGEN SATURATION: 98 % | TEMPERATURE: 98 F

## 2020-02-21 DIAGNOSIS — E66.9 OBESITY (BMI 30.0-34.9): ICD-10-CM

## 2020-02-21 DIAGNOSIS — F33.1 MAJOR DEPRESSIVE DISORDER, RECURRENT, MODERATE: ICD-10-CM

## 2020-02-21 DIAGNOSIS — Z11.4 ENCOUNTER FOR SCREENING FOR HIV: ICD-10-CM

## 2020-02-21 DIAGNOSIS — Z23 NEEDS FLU SHOT: ICD-10-CM

## 2020-02-21 DIAGNOSIS — G47.33 OSA ON CPAP: ICD-10-CM

## 2020-02-21 DIAGNOSIS — H93.11 TINNITUS, RIGHT EAR: ICD-10-CM

## 2020-02-21 DIAGNOSIS — Z79.899 MEDICATION MANAGEMENT: ICD-10-CM

## 2020-02-21 DIAGNOSIS — R09.81 NASAL CONGESTION: ICD-10-CM

## 2020-02-21 DIAGNOSIS — F41.9 ANXIETY: ICD-10-CM

## 2020-02-21 DIAGNOSIS — R73.03 PREDIABETES: ICD-10-CM

## 2020-02-21 DIAGNOSIS — Z86.010 HISTORY OF COLON POLYPS: ICD-10-CM

## 2020-02-21 DIAGNOSIS — E78.2 COMBINED HYPERLIPIDEMIA: ICD-10-CM

## 2020-02-21 DIAGNOSIS — E55.9 VITAMIN D DEFICIENCY: ICD-10-CM

## 2020-02-21 DIAGNOSIS — Z79.82 LONG-TERM USE OF ASPIRIN THERAPY: ICD-10-CM

## 2020-02-21 DIAGNOSIS — Z87.891 FORMER SMOKER: ICD-10-CM

## 2020-02-21 DIAGNOSIS — Z00.00 ROUTINE GENERAL MEDICAL EXAMINATION AT A HEALTH CARE FACILITY: Primary | ICD-10-CM

## 2020-02-21 DIAGNOSIS — Z23 NEED FOR SHINGLES VACCINE: ICD-10-CM

## 2020-02-21 DIAGNOSIS — H25.13 NUCLEAR SCLEROSIS, BILATERAL: ICD-10-CM

## 2020-02-21 PROBLEM — Z71.6 ENCOUNTER FOR SMOKING CESSATION COUNSELING: Status: RESOLVED | Noted: 2018-01-10 | Resolved: 2020-02-21

## 2020-02-21 PROCEDURE — 99999 PR PBB SHADOW E&M-EST. PATIENT-LVL III: CPT | Mod: PBBFAC,,, | Performed by: FAMILY MEDICINE

## 2020-02-21 PROCEDURE — 99396 PR PREVENTIVE VISIT,EST,40-64: ICD-10-PCS | Mod: 25,S$GLB,, | Performed by: FAMILY MEDICINE

## 2020-02-21 PROCEDURE — 99999 PR PBB SHADOW E&M-EST. PATIENT-LVL III: ICD-10-PCS | Mod: PBBFAC,,, | Performed by: FAMILY MEDICINE

## 2020-02-21 PROCEDURE — 90686 IIV4 VACC NO PRSV 0.5 ML IM: CPT | Mod: S$GLB,,, | Performed by: FAMILY MEDICINE

## 2020-02-21 PROCEDURE — 99396 PREV VISIT EST AGE 40-64: CPT | Mod: 25,S$GLB,, | Performed by: FAMILY MEDICINE

## 2020-02-21 PROCEDURE — 90471 FLU VACCINE (QUAD) GREATER THAN OR EQUAL TO 3YO PRESERVATIVE FREE IM: ICD-10-PCS | Mod: S$GLB,,, | Performed by: FAMILY MEDICINE

## 2020-02-21 PROCEDURE — 90471 IMMUNIZATION ADMIN: CPT | Mod: S$GLB,,, | Performed by: FAMILY MEDICINE

## 2020-02-21 PROCEDURE — 90686 FLU VACCINE (QUAD) GREATER THAN OR EQUAL TO 3YO PRESERVATIVE FREE IM: ICD-10-PCS | Mod: S$GLB,,, | Performed by: FAMILY MEDICINE

## 2020-02-21 RX ORDER — FLUTICASONE PROPIONATE 50 MCG
2 SPRAY, SUSPENSION (ML) NASAL DAILY
Qty: 16 G | Refills: 11 | Status: SHIPPED | OUTPATIENT
Start: 2020-02-21 | End: 2021-08-23 | Stop reason: SDUPTHER

## 2020-02-21 NOTE — PROGRESS NOTES
Office Visit    Patient Name: Stef Pool    : 1955  MRN: [id number]    Subjective:  Stef is a 64 y.o. male who presents today for:    Annual Exam (throbbing in ear, flu)    63 yo male here for annual exam and monitoring of chronic conditions including obesity, prediabetes, BARAK on CPAP, hyperlipidemia (with ongoing triglyceride elevation and low HDL), history of tobacco abuse(quit 2019), anxiety and depression (Zoloft 100 and Xanax 0.5 prn).    Labs drawn prior to office visit 20 include normal PSA/TSH/CBC/EGFR/LFTS. A1c 6.1 on Metformin 500 mg XR daily (up form 5.8). Vitamin D improved to 28 on 50,000 IU D2 Q7days. Lipid panel shows mildly elevated trig at 194/ HDL 33/ LDL 86 on Lipitor 40.      He has been feeling over all well. General mood outlook is good-- helped with daily zoloft. Takes prn xanax for anxiety. Bladder emptying improved with flomax.  Only acute complaint is some worsening nasal congestion with an associated throbbing in his right year.  He reports a noise somewhat like a heart beat but the pace is faster than his pulse.  It is not accompanied by any headaches or visual changes.  He has no associated symptoms other than the nasal congestion.     Diet: needs to again decreased fried foods, soft drinks, coffee and drink more water and needs to meal prep better.      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         Smoking Cessation Update: smoke free since 19 and is doing well. using 7 mg nicotine patch and 2 mg nicotine lozenges PRN     Screening tests: colonoscopy 19-- repeat 3 years, PSA 2.6 20, hep C neg 2015     Immunizations: TDaP 2013, FLU shot today 2020, Zostavax 1/10/2018 & SHINGRIX  ADVISED, Pneumovax 23 1/31/2012 (smoker)     Eye/Dental: eye [place] 1/26/2018- RTC one year & Ophtho dr Peters 4/17/18, dental UTD      Past Medical History  Past Medical History:   Diagnosis Date    Anxiety 11/20/2015    Combined hyperlipidemia 11/23/2012    HEARING LOSS     Major depressive disorder, recurrent, moderate 11/23/2012    Obesity (BMI 30.0-34.9) 11/20/2015    diet and exercise changes discussed     BARAK on CPAP 11/20/2015    uses at least 4 hours nightly     Prediabetes 11/23/2016    making dietary and exercise changes and staring Metformin 500 mg XR daily.  recheck in 3 months    Smoker 5/2/2013       Past Surgical History  Past Surgical History:   Procedure Laterality Date    COLONOSCOPY N/A 7/12/2019    Procedure: COLONOSCOPY;  Surgeon: Amrit Reyes MD;  Location: [facility];  Service: Endoscopy;  Laterality: N/A;       Family History  Family History   Problem Relation Age of Onset    Heart disease Mother        Social History  Social History     Socioeconomic History    Marital status:      Spouse name: Not on file    Number of children: Not on file    Years of education: Not on file    Highest education level: Not on file   Occupational History    Not on file   Social Needs    Financial resource strain: Not on file    Food insecurity:     Worry: Not on file     Inability: Not on file    Transportation needs:     Medical: Not on file     Non-medical: Not on file   Tobacco Use    Smoking status: Current Every Day Smoker    Smokeless tobacco: Never Used   Substance and Sexual Activity    Alcohol use: Yes    Drug use: No    Sexual activity: Not on file   Lifestyle    Physical activity:     Days per week: Not on file     Minutes per session: Not on file    Stress: Not on file   Relationships    Social connections:     Talks on phone: Not on file     Gets together: Not on file     Attends Cheondoism service: Not on file     Active member of club or organization: Not on  "file     Attends meetings of clubs or organizations: Not on file     Relationship status: Not on file   Other Topics Concern    Not on file   Social History Narrative    Not on file       Current Medications  Medications reviewed and updated.     Allergies   Review of patient's allergies indicates:  No Known Allergies    Review of Systems (Pertinent positives)  Review of Systems   Constitutional: Positive for unexpected weight change (weiht gain ).   HENT: Positive for congestion and tinnitus (pulsatile tinnitus). Negative for trouble swallowing.    Eyes: Negative for visual disturbance.   Respiratory: Positive for cough. Negative for shortness of breath.    Cardiovascular: Positive for leg swelling (mild). Negative for chest pain.   Gastrointestinal: Negative for constipation and diarrhea.   Genitourinary: Negative for difficulty urinating, dysuria and hematuria.   Allergic/Immunologic: Negative for environmental allergies.   Neurological: Negative for dizziness, light-headedness and headaches.   Psychiatric/Behavioral: Negative for sleep disturbance.       /86   Pulse 74   Temp 97.9 °F (36.6 °C)   Ht 5' 11" (1.803 m)   Wt 108.1 kg (238 lb 5.1 oz)   SpO2 98%   BMI 33.24 kg/m²     Physical Exam   Constitutional: He is oriented to person, place, and time. He appears well-developed and well-nourished. No distress.   HENT:   Head: Normocephalic and atraumatic.   Right Ear: External ear normal.   Left Ear: External ear normal.   Nose: Nose normal.   Mouth/Throat: Oropharynx is clear and moist. No oropharyngeal exudate.   Eyes: Conjunctivae are normal. No scleral icterus.   Neck: No tracheal deviation present. No thyromegaly present.   Cardiovascular: Normal rate, regular rhythm, normal heart sounds and intact distal pulses.   Pulmonary/Chest: Effort normal and breath sounds normal.   Abdominal: Soft. Bowel sounds are normal. He exhibits no distension and no mass. There is no tenderness. No hernia. "   Musculoskeletal: Normal range of motion.   Lymphadenopathy:     He has no cervical adenopathy.   Neurological: He is alert and oriented to person, place, and time. He has normal reflexes.   Skin: Skin is warm and dry. No rash noted.   Psychiatric: He has a normal mood and affect.   Vitals reviewed.        Assessment/Plan:  Stef Pool is a 64 y.o. male who presents today for :    Stef was seen today for annual exam.    Diagnoses and all orders for this visit:    Routine general medical examination at a health care facility  Comments:  HEALTH MAINTENANCE REVIEWED: SHINGRIX ADVISED & FLU SHOT GIVEN, TDAP/PCV 23 UTD. CSCOPE/PSA UTD. ADVISED ON DIET/EXERCISE/SLEEP, EYE/DENTAL EXAMS  Orders:  -     Hemoglobin A1c; Future  -     Comprehensive metabolic panel; Future  -     Lipid panel; Future  -     Vitamin D; Future  -     HIV 1 / 2 ANTIBODY; Future  -     Influenza - Quadrivalent (6 months+) (PF)    Obesity (BMI 30.0-34.9)  Comments:  Mediterranean diet handout provided and discussed the importance of a regular exercise routine.    BARAK on CPAP  Comments:  Discussed importance of nightly CPAP compliance    Prediabetes  Comments:  A1c 6.1 on Metformin 500XR daily, recheck 6 months  Orders:  -     Hemoglobin A1c; Future  -     Comprehensive metabolic panel; Future  -     Lipid panel; Future    Long-term use of aspirin therapy    Combined hyperlipidemia  Comments:  stable on Lipitor 40, LDL at goal < 100. lifestyle changes discussed to help w/ triglycerides  Orders:  -     Comprehensive metabolic panel; Future  -     Lipid panel; Future    Former smoker  Comments:  Has continued to do well with smoking cessation since July 2019-- only downside has been weight gain.    Anxiety  Comments:  stable on zoloft 100 mg daily w/ xanax prn    Major depressive disorder, recurrent, moderate  Comments:  stable on Zoloft, stopped Wellbutrin as noted no benefit    Vitamin D deficiency  Comments:  now near normal on weekly 50,000 IU  vit D supplement, will continue  Orders:  -     Vitamin D; Future    History of colon polyps  Comments:  repeat colonosocpy due 2022    Medication management    Nuclear sclerosis, bilateral  Comments:  Advised that he is due for exam but he wishes to postpone at this time.    Need for shingles vaccine    Encounter for screening for HIV  -     HIV 1 / 2 ANTIBODY; Future    Needs flu shot  -     Influenza - Quadrivalent (6 months+) (PF)    Nasal congestion  Comments:  Previously benefit from Flonase, refills provided, this may be affecting the throbbing in his right ear.  Orders:  -     fluticasone propionate (FLONASE) 50 mcg/actuation nasal spray; 2 sprays (100 mcg total) by Each Nostril route once daily.    Tinnitus, right ear  Comments:  pulsatile in nature so needs follow up if does not resolve.  No associated concerning symptoms such as headaches or visual changes. Notify if symptoms persist            ICD-10-CM ICD-9-CM    1. Routine general medical examination at a health care facility Z00.00 V70.0 Hemoglobin A1c      Comprehensive metabolic panel      Lipid panel      Vitamin D      HIV 1 / 2 ANTIBODY      Influenza - Quadrivalent (6 months+) (PF)    HEALTH MAINTENANCE REVIEWED: SHINGRIX ADVISED & FLU SHOT GIVEN, TDAP/PCV 23 UTD. CSCOPE/PSA UTD. ADVISED ON DIET/EXERCISE/SLEEP, EYE/DENTAL EXAMS   2. Obesity (BMI 30.0-34.9) E66.9 278.00     Mediterranean diet handout provided and discussed the importance of a regular exercise routine.   3. BARAK on CPAP G47.33 327.23     Z99.89 V46.8     Discussed importance of nightly CPAP compliance   4. Prediabetes R73.03 790.29 Hemoglobin A1c      Comprehensive metabolic panel      Lipid panel    A1c 6.1 on Metformin 500XR daily, recheck 6 months   5. Long-term use of aspirin therapy Z79.82 V58.66    6. Combined hyperlipidemia E78.2 272.2 Comprehensive metabolic panel      Lipid panel    stable on Lipitor 40, LDL at goal < 100. lifestyle changes discussed to help w/  triglycerides   7. Former smoker Z87.891 V15.82     Has continued to do well with smoking cessation since July 2019-- only downside has been weight gain.   8. Anxiety F41.9 300.00     stable on zoloft 100 mg daily w/ xanax prn   9. Major depressive disorder, recurrent, moderate F33.1 296.32     stable on Zoloft, stopped Wellbutrin as noted no benefit   10. Vitamin D deficiency E55.9 268.9 Vitamin D    now near normal on weekly 50,000 IU vit D supplement, will continue   11. History of colon polyps Z86.010 V12.72     repeat colonosocpy due 2022   12. Medication management Z79.899 V58.69    13. Nuclear sclerosis, bilateral H25.13 366.16     Advised that he is due for exam but he wishes to postpone at this time.   14. Need for shingles vaccine Z23 V04.89    15. Encounter for screening for HIV Z11.4 V73.89 HIV 1 / 2 ANTIBODY   16. Needs flu shot Z23 V04.81 Influenza - Quadrivalent (6 months+) (PF)   17. Nasal congestion R09.81 478.19 fluticasone propionate (FLONASE) 50 mcg/actuation nasal spray    Previously benefit from Flonase, refills provided, this may be affecting the throbbing in his right ear.   18. Tinnitus, right ear H93.11 388.30     pulsatile in nature so needs follow up if does not resolve.  No associated concerning symptoms such as headaches or visual changes. Notify if symptoms persist       Patient Instructions   ADVISE LOOKING INTO NEW 2-PART, NON-LIVE SHINGRIX SHINGLES VACCINE THROUGH YOUR LOCAL PHARMACY.     NOTIFY IF THE HEART BEAT TYPE NOISE IN THE RIGHT EAR DOES NOT IMPROVE WITH TREATMENT OF NASAL CONGESTION WITH FLONASE OR IS GETTING WORSE OR IF ACCOMPANIED BY HEADACHES OR VISUAL CHANGES.           Follow up in about 6 months (around 8/21/2020) for to follow up on lab results, return as needed for new concerns.

## 2020-02-21 NOTE — PATIENT INSTRUCTIONS
ADVISE LOOKING INTO NEW 2-PART, NON-LIVE SHINGRIX SHINGLES VACCINE THROUGH YOUR LOCAL PHARMACY.     NOTIFY IF THE HEART BEAT TYPE NOISE IN THE RIGHT EAR DOES NOT IMPROVE WITH TREATMENT OF NASAL CONGESTION WITH FLONASE OR IS GETTING WORSE OR IF ACCOMPANIED BY HEADACHES OR VISUAL CHANGES.

## 2020-06-16 NOTE — PROGRESS NOTES
Office Visit    Patient Name: Stef Pool    : 1955  MRN: [id number]    Subjective:  Stef is a 62 y.o. male who presents today for:    Follow-up (6 month f/u, refill all meds)    62 you male patient of mine last seen by me for annual exam 2018, here for 6 month follow up of chronic conditions including obesity, prediabetes, BARAK on CPAP, hyperlipidemia (with ongoing triglyceride elevation and low HDL), tobacco abuse, anxiety and depression. Labs drawn prior to visit on 8/10/2018 show overall stable lipid panel with triglycerides of 192, HDL 29, LDL 98. He is taking atorvastatin 40 mg daily. He is taking Metformin 500 mg XR daily for treatment of pre-diabetes and A1c is stable at 5.7. CMP is unremarkable. His weight is overall stable. He is not really exercising.  He is eating too many carbs and temporarily stopped his metformin.  He is also not been using his CPAP machine as consistently due to the need to update his supplies.  He does feel better when he uses his CPAP machine every night but currently has not been.       At our previous office visit he was prescribed chantix to aid with smoking cessation, and he reports he hasn't taken it yet.  Wife smokes too and she has not been ready either to try quitting.  Despite continued smoking, he denies increased shortness of breath, cough, sputum production.  His exercise tolerance is stable though as per above he is not doing much in terms of physical activity.     Repeat Colonoscopy was ordered but patient did not have this done.       He had a benign eyelid lesion removed by Dr Peters 2018.     Past Medical History  Past Medical History:   Diagnosis Date    Anxiety 2015    Combined hyperlipidemia 2012    HEARING LOSS     Major depressive disorder, recurrent, moderate 2012    Obesity (BMI 30.0-34.9) 2015    diet and exercise changes discussed     BARAK on CPAP 2015    uses at least 4 hours nightly     Prediabetes  PATIENT:  Laura Fong is a 17 year old female presenting for back and neck pain    PROBLEM:   Date of Initial Visit for this Episode:  6/16/2020     Visit #1    SUBJECTIVE / HPI: Patient presents with primary complaints of back and neck pain symptoms.  Patient was originally evaluated by Mel White NP in late May for the symptoms.  Referral was made to our office for evaluation and treatment.  Symptoms have been present for approximately 2 to 3 months and have been progressively worsening.  Patient states that she has been quite active in the past with weight lifting and exercising.  Patient recently states that she is also had weight changes.  Recently patient has increased her weight.  Patient believes this to be due to decreased activity.  Prior to increased activity patient notes that she had lost approximately 20 pounds.  Patient is also very concerned of her posture stating that she feels like she is getting a hunched back and that her belly protrudes.    Patient is accompanied by a staff member from [facility].    Description and onset: Back  Duration and Frequency of Pain: 2 to 3 months and constant  Radiation of pain: None into the upper or lower extremities.  Patient states that pain will extend up her back and into her low back  Pain rated at it's worst: 10/10  Pain rated currently:  8/10  Pain course: Gradually getting worse  Worse with: Nothing specific  Improved by:  Nothing and patient has attempted to treat with cold and medications without relief  Additional Features:  unremarkable  Other Health Care Providers seen for this: Mel White NP  Previous treatment: Evaluation referral  Previous injury: Unremarkable      Other Secondary/Associated Problems  Description, Duration and Location of Seondary Problem: Neck.  Patient noted waking up with pain especially of the right side of her neck/shoulder.  Patient noted a lump/swelling around this area.  This has improved since symptoms initially  "11/23/2016    making dietary and exercise changes and staring Metformin 500 mg XR daily.  recheck in 3 months    Smoker 5/2/2013       Past Surgical History  History reviewed. No pertinent surgical history.    Family History  Family History   Problem Relation Age of Onset    Heart disease Mother        Social History  Social History     Socioeconomic History    Marital status:      Spouse name: Not on file    Number of children: Not on file    Years of education: Not on file    Highest education level: Not on file   Social Needs    Financial resource strain: Not on file    Food insecurity - worry: Not on file    Food insecurity - inability: Not on file    Transportation needs - medical: Not on file    Transportation needs - non-medical: Not on file   Occupational History    Not on file   Tobacco Use    Smoking status: Current Every Day Smoker    Smokeless tobacco: Never Used   Substance and Sexual Activity    Alcohol use: Yes    Drug use: No    Sexual activity: Not on file   Other Topics Concern    Not on file   Social History Narrative    Not on file       Current Medications  Medications reviewed and updated.     Allergies   Review of patient's allergies indicates:  No Known Allergies    Review of Systems (Pertinent positives)  Review of Systems   Constitutional: Negative for fatigue and unexpected weight change.   HENT: Negative for congestion.    Respiratory: Negative for cough and shortness of breath.    Cardiovascular: Negative for chest pain and leg swelling.   Gastrointestinal: Negative for blood in stool, constipation and diarrhea.   Neurological: Negative for headaches.       /77   Pulse 60   Temp 97.9 °F (36.6 °C)   Ht 5' 11" (1.803 m)   Wt 100.1 kg (220 lb 10.9 oz)   SpO2 96%   BMI 30.78 kg/m²     Physical Exam   Constitutional: He is oriented to person, place, and time. He appears well-developed and well-nourished. No distress.   Cardiovascular: Normal rate, regular " presented    Duration and Frequency of Pain: few days ago and constant  Radiation of pain: None into the upper extremities  Pain rated at it's worst: 10/10  Pain rated currently:  8/10  Pain course: Mild improvement  Worse with: Unremarkable  Improved by: Patient attempted to treat with ice and medication.  These interventions provided little to no relief  Additional Features: Unremarkable  Other Health Care Providers seen for this: None  Previous treatment: None  Previous injury: None    See flowsheets in chart for details.  6/16/2020   Neck Disability Index (  Sulaiman DAVILA. and Falguni LENNON. 1991. All rights reserved.; used with permission) 6/16/2020   SECTION 1 - PAIN INTENSITY 2   SECTION 2 - PERSONAL CARE 1   SECTION 3 - LIFTING 1   SECTION 4 - READING 2   SECTION 5 - HEADACHES 3   SECTION 6 - CONCENTRATION 3   SECTION 7 - WORK 3   SECTION 8 - DRIVING 2   SECTION 9 - SLEEPING 2   SECTION 10 - RECREATION 1   Count 10   Sum 20   Raw Score: /50 20   Neck Disability Index Score: (%) 40     Oswestry (BRAEDEN) Questionnaire    OSWESTRY DISABILITY INDEX 6/16/2020   Count 9   Sum 15   Oswestry Score (%) 33.33        Functional limitations:  Sitting, sleeping and lifting weights      Past D.C. Care: no         PAST MEDICAL HISTORY:  History reviewed. No pertinent past medical history.    PAST SURGICAL HISTORY:  History reviewed. No pertinent surgical history.    ALLERGIES:  No Known Allergies    CURRENT MEDICATIONS:  Current Outpatient Medications   Medication Sig Dispense Refill     benzoyl peroxide (ACNE-CLEAR) 10 % external gel Apply topically At Bedtime 60 g 1     escitalopram (LEXAPRO) 10 MG tablet Take 10 mg by mouth daily       hydrOXYzine (VISTARIL) 25 MG capsule Take 25 mg by mouth 3 times daily       lisdexamfetamine (VYVANSE) 60 MG capsule Take 1 capsule (60 mg) by mouth every morning 30 capsule 0     melatonin 5 MG tablet Take 5 mg by mouth nightly as needed for sleep       naproxen (NAPROSYN) 500 MG tablet Take 1  rhythm and normal heart sounds.   Pulmonary/Chest: Effort normal and breath sounds normal.   Musculoskeletal: He exhibits no edema.   Neurological: He is alert and oriented to person, place, and time.   Psychiatric: He has a normal mood and affect.   Vitals reviewed.        Assessment/Plan:  Stef Pool is a 62 y.o. male who presents today for :    Stef was seen today for follow-up.    Diagnoses and all orders for this visit:    Combined hyperlipidemia  -     Hemoglobin A1c; Future  -     Comprehensive metabolic panel; Future  -     Lipid panel; Future  -     TSH; Future    BARAK on CPAP    Obesity (BMI 30.0-34.9)  -     Hemoglobin A1c; Future  -     Comprehensive metabolic panel; Future  -     Lipid panel; Future  -     TSH; Future    Smoker    Prediabetes  -     Hemoglobin A1c; Future  -     Comprehensive metabolic panel; Future  -     Lipid panel; Future    Major depressive disorder, recurrent, moderate  -     TSH; Future    Anxiety  -     TSH; Future  -     ALPRAZolam (XANAX) 0.5 MG tablet; TAKE 0NE TABLET BY MOUTH NIGHTLY AS NEEDED FOR ANXIETY    Long-term use of aspirin therapy    Medication management  -     Hemoglobin A1c; Future  -     Comprehensive metabolic panel; Future  -     Lipid panel; Future  -     CBC auto differential; Future  -     TSH; Future  -     Vitamin D; Future  -     PSA, Screening; Future    Encounter for prostate cancer screening  -     PSA, Screening; Future    Colon cancer screening  -     Case request GI: COLONOSCOPY            ICD-10-CM ICD-9-CM    1. Combined hyperlipidemia E78.2 272.2 Hemoglobin A1c      Comprehensive metabolic panel      Lipid panel      TSH   2. BARAK on CPAP G47.33 327.23     Z99.89 V46.8    3. Obesity (BMI 30.0-34.9) E66.9 278.00 Hemoglobin A1c      Comprehensive metabolic panel      Lipid panel      TSH   4. Smoker F17.200 305.1    5. Prediabetes R73.03 790.29 Hemoglobin A1c      Comprehensive metabolic panel      Lipid panel   6. Major depressive disorder,  tablet (500 mg) by mouth 2 times daily (with meals) for 14 days 28 tablet 0     triamcinolone (KENALOG) 0.1 % external cream Apply topically 2 times daily as needed for irritation 85.2 g 3       SOCIAL HISTORY:  Marital Status: single (never ).  Children: no.  Occupation: N/A.  Alcohol use:none.  Tobacco use: Smoker: former.  Are you or have you used illicit drugs:  yes, marijuana history.    FAMILY HISTORY:  Family History   Family history unknown: Yes       Patient Active Problem List   Diagnosis     Anxiety     Acne vulgaris     Sleep concern     Adjustment disorder with mixed disturbance of emotions and conduct     Attention deficit hyperactivity disorder (ADHD), combined type     History of self injurious behavior         ROS:  The patient denies any fevers, chills, nausea, vomiting, diarrhea, constipation,dysuria, hematuria, or urinary hesitancy or incontinence.  No shortness of breath, chest pain, or rashes.    OBJECTIVE:    DIAGNOSTICS:  No current spinal imaging taken.     PHYSICAL EXAM:     GENERAL APPEARANCE: healthy, alert, mild distress, moderate distress and cooperative   GAIT: NORMAL      MUSCULOSKELETAL:   Posture: Patient stands with a swayback position.  Accentuated upper thoracic kyphotic curvature, hips are tucked and cervical spine is moderately anterior. Shoulders and iliac crest appear level bilaterally.  Gait:  unremarkable.     Cervical performed actively, measured approximately  ROM:   smooth/halting arc of motion   50/50 flexion with pain   40/45 extension with pain   40/45 RLF with pain  30/45 LLF with pain    80/85 RR with pain       70/85 LR   With pain    -Maximal Foraminal Compression: +focal mild neck pain. -radicular symptoms  -Distraction increases neck pain      Thoracic and Lumbar performed actively, measured approximately  ROM:  55/60 flexion 45/60 extension    40/45 RLF    40/45 LLF   45/45 RR      45/45 LR    +Kemps: Laterally lumbosacral region, mid thoracic spine  +  Straight leg raise right side,  Other:  + Elys bilaterally.    +Tenderness: Mild jump sign elicited lumbosacral junction bilaterally, T8 midline, T3 right side, C7 right side, C2 left side  +Muscle spasm: Quadratus lumborum bilaterally, paraspinal musculature TL junction bilaterally, upper trapezius, levator scapula, scalenes, suboccipitals bilaterally, taut and tender fibers noted of the C-spine paraspinals bilaterally.  +Joint asymmetry and restriction: C2 left lateral flexion right rotation restriction, C7 extension left rotation restriction, T3 extension left rotation restriction, T8 extension restriction, L5 extension right lateral flexion left rotation restriction, sacrum extension and left lateral flexion restriction    ASSESSMENT: Laura Fong is a 17 year old female presenting with complaints of back and neck pain.  I did find evidence to suggest segmental/somatic dysfunction present of the cervical, thoracic, and lumbar sacral regions.  Patient does have a significant amount of muscular spasms.  I believe that majority of patient's symptoms likely stemming from poor posture.  I also believe that patient's recent fluctuations in weight and decreased physical activity are likely contributing factors as well.  Do believe patient is a good candidate for chiropractic care and should respond favorably with course of treatment.  As the patient has not been under chiropractic care in the past I did inform patient of techniques to be utilized today as well as the fact that she might feel somewhat sore after treatment.  Patient understood my instructions and as there were no other questions we opted to perform chiropractic care today.     1. Lumbago    2. Segmental and somatic dysfunction of lumbar region    3. Segmental and somatic dysfunction of sacral region    4. Pain in thoracic spine    5. Segmental and somatic dysfunction of thoracic region    6. Cervicalgia    7. Segmental and somatic dysfunction of  recurrent, moderate F33.1 296.32 TSH   7. Anxiety F41.9 300.00 TSH      ALPRAZolam (XANAX) 0.5 MG tablet   8. Long-term use of aspirin therapy Z79.82 V58.66    9. Medication management Z79.899 V58.69 Hemoglobin A1c      Comprehensive metabolic panel      Lipid panel      CBC auto differential      TSH      Vitamin D      PSA, Screening   10. Encounter for prostate cancer screening Z12.5 V76.44 PSA, Screening   11. Colon cancer screening Z12.11 V76.51 Case request GI: COLONOSCOPY       Patient Instructions   OBTAIN REPEAT COLONOSCOPY-- BE PREPARED TO SCHEDULE A DATE WHEN CONTACTED.  CONTINUE TO CONSIDER SMOKING CESSATION.  CONTINUE CURRENT MEDS AS PRESCRIBED WITH REPEAT LABS IN 6 MONTHS, PRIOR TO ANNUAL.  INCORPORATE REGULAR EXERCISE AND LOWER CARB DIET. CONTACT OFFICE IF ANY ISSUES WITH OBTAINING MORE CPAP SUPPLIES-- USE CPAP NIGHTLY.         Follow-up in about 6 months (around 2/24/2019) for annual exam in 6 months with labs prior, sooner if concerns.   cervical region    8. Spasm of muscle    9. Poor posture        PLAN    Evaluation and Management:  90867 Moderate exam established patient 20 min    Procedures:  Modalities:  None performed this visit    CMT:  [id number] Chiropractic manipulative treatment 3-4 regions performed   Cervical: Diversified, C2, C7 , Supine  Thoracic: Diversified, T3, T8, Prone  Lumbar: Diversified, L5, Side posture  Pelvis: Diversified, Sacrum , Side posture    Therapeutic procedures:  Encourage patient to utilize core strengthening exercises.  We reviewed planking exercise.  Also encouraged patient to perform various squatting motions as activation of the posterior muscle chain will help to stabilize patient posture.    Response to Treatment  Reduction in symptoms as reported by patient    Prognosis: Excellent    6/16/2020 Plan of Care:  4-6 visits of Chiropractic Care including Spinal Adjustments and/or physiotherapy and active rehabilitation, to include exercises in the office and/or at home to meet care plan goals.     Frequency: 1-2xweek for up to 4 weeks. A reevaluation would be clinically appropriate in 6 visits, to determine progress and further course of care.    POC discussed and patient agreeable to plan of care.      6/16/2020 Goals:      Patient will report improved back pain by 75%.   Patient will report improved neck pain by 75%.   Patient will report able to exercise without painful limits.   Patient will demonstrate an improved ability to complete Activities of Daily Living  as shown by a reported 10-30% reduced score on neck and/or back index.    Patient will demonstrate improved spinal ROM.        INSTRUCTIONS   ice 20 minutes every other hour as needed and heat 15 minutes every other hour as needed    Follow-up:  Return to care in 4 days.        Disclaimer: This note consists of symbols derived from keyboarding, dictation and/or voice recognition software. As a result, there may be errors in the script that have gone  undetected. Please consider this when interpreting information found in this chart.

## 2020-08-07 ENCOUNTER — LAB VISIT (OUTPATIENT)
Dept: LAB | Facility: HOSPITAL | Age: 65
End: 2020-08-07
Attending: FAMILY MEDICINE
Payer: COMMERCIAL

## 2020-08-07 DIAGNOSIS — Z00.00 ROUTINE GENERAL MEDICAL EXAMINATION AT A HEALTH CARE FACILITY: ICD-10-CM

## 2020-08-07 DIAGNOSIS — R73.03 PREDIABETES: ICD-10-CM

## 2020-08-07 DIAGNOSIS — Z11.4 ENCOUNTER FOR SCREENING FOR HIV: ICD-10-CM

## 2020-08-07 DIAGNOSIS — E78.2 COMBINED HYPERLIPIDEMIA: ICD-10-CM

## 2020-08-07 DIAGNOSIS — E55.9 VITAMIN D DEFICIENCY: ICD-10-CM

## 2020-08-07 LAB
25(OH)D3+25(OH)D2 SERPL-MCNC: 28 NG/ML (ref 30–96)
ALBUMIN SERPL BCP-MCNC: 3.9 G/DL (ref 3.5–5.2)
ALP SERPL-CCNC: 105 U/L (ref 55–135)
ALT SERPL W/O P-5'-P-CCNC: 19 U/L (ref 10–44)
ANION GAP SERPL CALC-SCNC: 7 MMOL/L (ref 8–16)
AST SERPL-CCNC: 20 U/L (ref 10–40)
BILIRUB SERPL-MCNC: 0.4 MG/DL (ref 0.1–1)
BUN SERPL-MCNC: 18 MG/DL (ref 8–23)
CALCIUM SERPL-MCNC: 9.2 MG/DL (ref 8.7–10.5)
CHLORIDE SERPL-SCNC: 109 MMOL/L (ref 95–110)
CHOLEST SERPL-MCNC: 159 MG/DL (ref 120–199)
CHOLEST/HDLC SERPL: 4.7 {RATIO} (ref 2–5)
CO2 SERPL-SCNC: 25 MMOL/L (ref 23–29)
CREAT SERPL-MCNC: 1.1 MG/DL (ref 0.5–1.4)
EST. GFR  (AFRICAN AMERICAN): >60 ML/MIN/1.73 M^2
EST. GFR  (NON AFRICAN AMERICAN): >60 ML/MIN/1.73 M^2
ESTIMATED AVG GLUCOSE: 134 MG/DL (ref 68–131)
GLUCOSE SERPL-MCNC: 111 MG/DL (ref 70–110)
HBA1C MFR BLD HPLC: 6.3 % (ref 4–5.6)
HDLC SERPL-MCNC: 34 MG/DL (ref 40–75)
HDLC SERPL: 21.4 % (ref 20–50)
HIV 1+2 AB+HIV1 P24 AG SERPL QL IA: NEGATIVE
LDLC SERPL CALC-MCNC: 83.8 MG/DL (ref 63–159)
NONHDLC SERPL-MCNC: 125 MG/DL
POTASSIUM SERPL-SCNC: 4.4 MMOL/L (ref 3.5–5.1)
PROT SERPL-MCNC: 6.7 G/DL (ref 6–8.4)
SODIUM SERPL-SCNC: 141 MMOL/L (ref 136–145)
TRIGL SERPL-MCNC: 206 MG/DL (ref 30–150)

## 2020-08-07 PROCEDURE — 80061 LIPID PANEL: CPT

## 2020-08-07 PROCEDURE — 83036 HEMOGLOBIN GLYCOSYLATED A1C: CPT

## 2020-08-07 PROCEDURE — 86703 HIV-1/HIV-2 1 RESULT ANTBDY: CPT

## 2020-08-07 PROCEDURE — 82306 VITAMIN D 25 HYDROXY: CPT

## 2020-08-07 PROCEDURE — 36415 COLL VENOUS BLD VENIPUNCTURE: CPT

## 2020-08-07 PROCEDURE — 80053 COMPREHEN METABOLIC PANEL: CPT

## 2020-08-21 ENCOUNTER — OFFICE VISIT (OUTPATIENT)
Dept: FAMILY MEDICINE | Facility: CLINIC | Age: 65
End: 2020-08-21
Payer: COMMERCIAL

## 2020-08-21 VITALS
DIASTOLIC BLOOD PRESSURE: 66 MMHG | TEMPERATURE: 99 F | BODY MASS INDEX: 32.62 KG/M2 | HEIGHT: 71 IN | HEART RATE: 55 BPM | WEIGHT: 233 LBS | SYSTOLIC BLOOD PRESSURE: 122 MMHG | OXYGEN SATURATION: 96 %

## 2020-08-21 DIAGNOSIS — E78.2 COMBINED HYPERLIPIDEMIA: ICD-10-CM

## 2020-08-21 DIAGNOSIS — Z79.899 MEDICATION MANAGEMENT: ICD-10-CM

## 2020-08-21 DIAGNOSIS — Z86.010 HISTORY OF COLON POLYPS: ICD-10-CM

## 2020-08-21 DIAGNOSIS — Z87.891 FORMER SMOKER: ICD-10-CM

## 2020-08-21 DIAGNOSIS — E66.9 OBESITY (BMI 30.0-34.9): ICD-10-CM

## 2020-08-21 DIAGNOSIS — F33.1 MAJOR DEPRESSIVE DISORDER, RECURRENT, MODERATE: ICD-10-CM

## 2020-08-21 DIAGNOSIS — Z12.5 SCREENING FOR MALIGNANT NEOPLASM OF PROSTATE: ICD-10-CM

## 2020-08-21 DIAGNOSIS — F41.9 ANXIETY: ICD-10-CM

## 2020-08-21 DIAGNOSIS — Z79.82 LONG-TERM USE OF ASPIRIN THERAPY: ICD-10-CM

## 2020-08-21 DIAGNOSIS — E55.9 VITAMIN D DEFICIENCY: ICD-10-CM

## 2020-08-21 DIAGNOSIS — G47.33 OSA ON CPAP: ICD-10-CM

## 2020-08-21 DIAGNOSIS — R73.03 PREDIABETES: Primary | ICD-10-CM

## 2020-08-21 PROCEDURE — 3008F PR BODY MASS INDEX (BMI) DOCUMENTED: ICD-10-PCS | Mod: CPTII,S$GLB,, | Performed by: FAMILY MEDICINE

## 2020-08-21 PROCEDURE — 99214 PR OFFICE/OUTPT VISIT, EST, LEVL IV, 30-39 MIN: ICD-10-PCS | Mod: S$GLB,,, | Performed by: FAMILY MEDICINE

## 2020-08-21 PROCEDURE — 99999 PR PBB SHADOW E&M-EST. PATIENT-LVL V: ICD-10-PCS | Mod: PBBFAC,,, | Performed by: FAMILY MEDICINE

## 2020-08-21 PROCEDURE — 3008F BODY MASS INDEX DOCD: CPT | Mod: CPTII,S$GLB,, | Performed by: FAMILY MEDICINE

## 2020-08-21 PROCEDURE — 99999 PR PBB SHADOW E&M-EST. PATIENT-LVL V: CPT | Mod: PBBFAC,,, | Performed by: FAMILY MEDICINE

## 2020-08-21 PROCEDURE — 99214 OFFICE O/P EST MOD 30 MIN: CPT | Mod: S$GLB,,, | Performed by: FAMILY MEDICINE

## 2020-08-21 RX ORDER — METFORMIN HYDROCHLORIDE 500 MG/1
500 TABLET, EXTENDED RELEASE ORAL 2 TIMES DAILY WITH MEALS
Qty: 180 TABLET | Refills: 4 | Status: SHIPPED | OUTPATIENT
Start: 2020-08-21 | End: 2021-08-23 | Stop reason: SDUPTHER

## 2020-08-21 RX ORDER — ACETAMINOPHEN 500 MG
1 TABLET ORAL DAILY
Qty: 90 CAPSULE | Refills: 4 | Status: SHIPPED | OUTPATIENT
Start: 2020-08-21 | End: 2021-08-23 | Stop reason: SDUPTHER

## 2020-08-21 NOTE — PROGRESS NOTES
Office Visit    Patient Name: Stef Pool    : 1955  MRN: [id number]    Subjective:  Stef is a 64 y.o. male who presents today for:    Follow-up (6 month)    63 yo male patient of mine, last seen by me for annual exam 20, here for 6 month follow up of chronic conditions including obesity, prediabetes, BARAK on CPAP, hyperlipidemia (with ongoing triglyceride elevation and low HDL), history of tobacco abuse(quit 2019), anxiety and depression (Zoloft 100 and Xanax 0.5 prn).     Labs 20 showed normal PSA/TSH/CBC/EGFR/LFTS. A1c 6.1 on Metformin 500 mg XR daily (up form 5.8). Vitamin D improved to 28 on 50,000 IU D2 Q7days. Lipid panel shows mildly elevated trig at 194/ HDL 33/ LDL 86 on Lipitor 40.   REPEAT LABS PRIOR TO TODAY'S OFFICE VISIT 20 WITH FURTHER INCREASE IN A1C TO 6.3, NORMAL CMP, VIT D STILL 28, AND LIPID PANEL WITH TRIG 206, HDL 34, LDL 83.     WEIGHT IS DOWN 5 LBS IN THE LAST 6 MONTHS AND BLOOD PRESSURES CONTROLLED.  He is trying to walk in the mornings and ride his bike in the evenings.   Diet: poor meal planning leading to poor convenience choices.     He has been feeling over all well. General mood outlook is good-- helped with daily zoloft. Takes prn xanax for anxiety. Bladder emptying improved with flomax.    He feels good about his continued smoking cessation.     He is concerned that he may need an adjustment to his CPAP device.  He has had it for very long time and has not followed up with sleep medicine in several years.  He is not waking up feeling as rested like he used to and he is more restless throughout the night with his sleep.    Past Medical History  Past Medical History:   Diagnosis Date    Anxiety 2015    Combined hyperlipidemia 2012    HEARING LOSS     Major depressive disorder, recurrent, moderate 2012    Obesity (BMI 30.0-34.9) 2015    diet and exercise changes discussed     BARAK on CPAP 2015    uses at least 4 hours nightly      Prediabetes 11/23/2016    making dietary and exercise changes and staring Metformin 500 mg XR daily.  recheck in 3 months    Smoker 5/2/2013       Past Surgical History  Past Surgical History:   Procedure Laterality Date    COLONOSCOPY N/A 7/12/2019    Procedure: COLONOSCOPY;  Surgeon: Amrit Reyes MD;  Location: [facility];  Service: Endoscopy;  Laterality: N/A;       Family History  Family History   Problem Relation Age of Onset    Heart disease Mother        Social History  Social History     Socioeconomic History    Marital status:      Spouse name: Not on file    Number of children: Not on file    Years of education: Not on file    Highest education level: Not on file   Occupational History    Not on file   Social Needs    Financial resource strain: Not on file    Food insecurity     Worry: Not on file     Inability: Not on file    Transportation needs     Medical: Not on file     Non-medical: Not on file   Tobacco Use    Smoking status: Current Every Day Smoker    Smokeless tobacco: Never Used   Substance and Sexual Activity    Alcohol use: Yes    Drug use: No    Sexual activity: Not on file   Lifestyle    Physical activity     Days per week: Not on file     Minutes per session: Not on file    Stress: Not on file   Relationships    Social connections     Talks on phone: Not on file     Gets together: Not on file     Attends Jainism service: Not on file     Active member of club or organization: Not on file     Attends meetings of clubs or organizations: Not on file     Relationship status: Not on file   Other Topics Concern    Not on file   Social History Narrative    Not on file       Current Medications  Medications reviewed and updated.     Allergies   Review of patient's allergies indicates:  No Known Allergies    Review of Systems (Pertinent positives)  Review of Systems   Constitutional: Negative for unexpected weight change.   Respiratory: Negative for shortness of  "breath.    Cardiovascular: Negative for chest pain and leg swelling.   Psychiatric/Behavioral: Positive for sleep disturbance. Negative for dysphoric mood. The patient is not nervous/anxious.        /66 (BP Location: Left arm, Patient Position: Sitting, BP Method: Medium (Manual))   Pulse (!) 55   Temp 98.5 °F (36.9 °C) (Oral)   Ht 5' 11" (1.803 m)   Wt 105.7 kg (233 lb 0.4 oz)   SpO2 96%   BMI 32.50 kg/m²     Physical Exam  Vitals signs reviewed.   Constitutional:       General: He is not in acute distress.     Appearance: He is well-developed.   Cardiovascular:      Rate and Rhythm: Normal rate and regular rhythm.      Heart sounds: Normal heart sounds.   Pulmonary:      Effort: Pulmonary effort is normal.      Breath sounds: Normal breath sounds.   Skin:     General: Skin is warm and dry.   Neurological:      Mental Status: He is alert and oriented to person, place, and time.   Psychiatric:         Mood and Affect: Mood normal.         Behavior: Behavior normal.           Assessment/Plan:  Stef Pool is a 64 y.o. male who presents today for :          ICD-10-CM ICD-9-CM    1. Prediabetes  R73.03 790.29 metFORMIN (GLUCOPHAGE-XR) 500 MG ER 24hr tablet      Hemoglobin A1C      Comprehensive metabolic panel      Lipid Panel      PSA, Screening      CBC auto differential      TSH      Vitamin D   2. Obesity (BMI 30.0-34.9)  E66.9 278.00    3. Combined hyperlipidemia  E78.2 272.2 Hemoglobin A1C      Comprehensive metabolic panel      Lipid Panel      TSH   4. BARAK on CPAP  G47.33 327.23 Ambulatory referral/consult to Sleep Disorders    Z99.89 V46.8    5. Long-term use of aspirin therapy  Z79.82 V58.66    6. Former smoker  Z87.891 V15.82    7. History of colon polyps  Z86.010 V12.72     CSCOPE 2019, REPEAT 3 YEARS   8. Major depressive disorder, recurrent, moderate  F33.1 296.32    9. Anxiety  F41.9 300.00    10. Vitamin D deficiency  E55.9 268.9 cholecalciferol, vitamin D3, (VITAMIN D3) 50 mcg (2,000 " unit) Cap      Vitamin D   11. Medication management  Z79.899 V58.69 Hemoglobin A1C      Comprehensive metabolic panel      Lipid Panel      PSA, Screening      CBC auto differential      TSH      Vitamin D   12. Screening for malignant neoplasm of prostate  Z12.5 V76.44 PSA, Screening     PRE DIABETES:  HIS A1C HAS INCREASED TO 6.3 WHICH IS VERY CLOSE TO THE DIABETIC RANGE.  HE WAS COUNSELED ON IMPROVED ATTENTION TO DIET, EXERCISE, BUT WE ARE INCREASING HIS METFORMIN  EXTENDED RELEASE TWICE DAILY TO DECREASE THE CHANCE THAT HE WILL MOVE INTO THE DIABETES RANGE.  RECHECK A1C IN 6 MONTHS.    OBESITY:  HE DID LOSE 5 LB OVER THE LAST FEW MONTHS WHICH HE ATTRIBUTES TO WALKING MORE WHEN HE WAS NOT HAVING GOING TO WORK DURING COVID -19 PANDEMIC HE WAS DOING A PROJECT AT HOME.  HE RECENTLY HAS NOT BEEN AS GOOD ABOUT IS WALKING ROUTINE AND IS NOT GOOD ABOUT MEAL PLANNING-NEEDS TO WORK ON THIS WITH HIS FAMILY.    HYPERLIPIDEMIA:  CONTINUE STATIN-LDL REMAINS AT GOAL, BUT TRIGLYCERIDES AND HDL WOULD LIKELY IMPROVE WITH BETTER EXERCISE AND DIET ROUTINE, WEIGHT LOSS.  RECHECK IN 6 MONTHS    DEPRESSION, ANXIETY:  HIS MOOD IS STABLE ON DAILY ZOLOFT WITH XANAX P.R.N..  HE PLANS TO RETIRE THIS MARCH 2021 AND FEELS THAT HIS STRESS LEVEL WILL GO DOWN SIGNIFICANTLY AFTER THAT.    Patient Instructions   ADVISE LOOKING INTO NEW 2-PART, NON-LIVE SHINGRIX SHINGLES VACCINE THROUGH YOUR LOCAL PHARMACY.     ADVISE YEARLY FLU SHOT THIS FALL AROUND OCTOBER 1, 2020.    LABS PRIOR TO ANNUAL PHYSICAL IN 6 MONTHS, PLEASE FOLLOW-UP SOONER IF NEED.        Follow up in about 6 months (around 2/21/2021) for to follow up on lab results, return as needed for new concerns.

## 2020-08-21 NOTE — PATIENT INSTRUCTIONS
ADVISE LOOKING INTO NEW 2-PART, NON-LIVE SHINGRIX SHINGLES VACCINE THROUGH YOUR LOCAL PHARMACY.     ADVISE YEARLY FLU SHOT THIS FALL AROUND OCTOBER 1, 2020.    LABS PRIOR TO ANNUAL PHYSICAL IN 6 MONTHS, PLEASE FOLLOW-UP SOONER IF NEED.

## 2020-08-24 ENCOUNTER — IMMUNIZATION (OUTPATIENT)
Dept: PHARMACY | Facility: CLINIC | Age: 65
End: 2020-08-24
Payer: COMMERCIAL

## 2020-10-19 ENCOUNTER — IMMUNIZATION (OUTPATIENT)
Dept: PHARMACY | Facility: CLINIC | Age: 65
End: 2020-10-19
Payer: COMMERCIAL

## 2021-03-01 ENCOUNTER — LAB VISIT (OUTPATIENT)
Dept: LAB | Facility: HOSPITAL | Age: 66
End: 2021-03-01
Attending: FAMILY MEDICINE
Payer: COMMERCIAL

## 2021-03-01 DIAGNOSIS — E78.2 COMBINED HYPERLIPIDEMIA: ICD-10-CM

## 2021-03-01 DIAGNOSIS — R73.03 PREDIABETES: ICD-10-CM

## 2021-03-01 DIAGNOSIS — E55.9 VITAMIN D DEFICIENCY: ICD-10-CM

## 2021-03-01 DIAGNOSIS — Z79.899 MEDICATION MANAGEMENT: ICD-10-CM

## 2021-03-01 DIAGNOSIS — Z12.5 SCREENING FOR MALIGNANT NEOPLASM OF PROSTATE: ICD-10-CM

## 2021-03-01 LAB
25(OH)D3+25(OH)D2 SERPL-MCNC: 62 NG/ML (ref 30–96)
ALBUMIN SERPL BCP-MCNC: 3.8 G/DL (ref 3.5–5.2)
ALP SERPL-CCNC: 110 U/L (ref 55–135)
ALT SERPL W/O P-5'-P-CCNC: 13 U/L (ref 10–44)
ANION GAP SERPL CALC-SCNC: 8 MMOL/L (ref 8–16)
AST SERPL-CCNC: 17 U/L (ref 10–40)
BASOPHILS # BLD AUTO: 0.03 K/UL (ref 0–0.2)
BASOPHILS NFR BLD: 0.5 % (ref 0–1.9)
BILIRUB SERPL-MCNC: 0.5 MG/DL (ref 0.1–1)
BUN SERPL-MCNC: 15 MG/DL (ref 8–23)
CALCIUM SERPL-MCNC: 8.8 MG/DL (ref 8.7–10.5)
CHLORIDE SERPL-SCNC: 107 MMOL/L (ref 95–110)
CHOLEST SERPL-MCNC: 171 MG/DL (ref 120–199)
CHOLEST/HDLC SERPL: 5.5 {RATIO} (ref 2–5)
CO2 SERPL-SCNC: 23 MMOL/L (ref 23–29)
COMPLEXED PSA SERPL-MCNC: 2.5 NG/ML (ref 0–4)
CREAT SERPL-MCNC: 0.9 MG/DL (ref 0.5–1.4)
DIFFERENTIAL METHOD: NORMAL
EOSINOPHIL # BLD AUTO: 0.3 K/UL (ref 0–0.5)
EOSINOPHIL NFR BLD: 4.7 % (ref 0–8)
ERYTHROCYTE [DISTWIDTH] IN BLOOD BY AUTOMATED COUNT: 13.7 % (ref 11.5–14.5)
EST. GFR  (AFRICAN AMERICAN): >60 ML/MIN/1.73 M^2
EST. GFR  (NON AFRICAN AMERICAN): >60 ML/MIN/1.73 M^2
ESTIMATED AVG GLUCOSE: 123 MG/DL (ref 68–131)
GLUCOSE SERPL-MCNC: 105 MG/DL (ref 70–110)
HBA1C MFR BLD: 5.9 % (ref 4–5.6)
HCT VFR BLD AUTO: 45.7 % (ref 40–54)
HDLC SERPL-MCNC: 31 MG/DL (ref 40–75)
HDLC SERPL: 18.1 % (ref 20–50)
HGB BLD-MCNC: 14.8 G/DL (ref 14–18)
IMM GRANULOCYTES # BLD AUTO: 0.01 K/UL (ref 0–0.04)
IMM GRANULOCYTES NFR BLD AUTO: 0.2 % (ref 0–0.5)
LDLC SERPL CALC-MCNC: 118 MG/DL (ref 63–159)
LYMPHOCYTES # BLD AUTO: 1.6 K/UL (ref 1–4.8)
LYMPHOCYTES NFR BLD: 25.4 % (ref 18–48)
MCH RBC QN AUTO: 28.4 PG (ref 27–31)
MCHC RBC AUTO-ENTMCNC: 32.4 G/DL (ref 32–36)
MCV RBC AUTO: 88 FL (ref 82–98)
MONOCYTES # BLD AUTO: 0.4 K/UL (ref 0.3–1)
MONOCYTES NFR BLD: 6 % (ref 4–15)
NEUTROPHILS # BLD AUTO: 3.9 K/UL (ref 1.8–7.7)
NEUTROPHILS NFR BLD: 63.2 % (ref 38–73)
NONHDLC SERPL-MCNC: 140 MG/DL
NRBC BLD-RTO: 0 /100 WBC
PLATELET # BLD AUTO: 176 K/UL (ref 150–350)
PMV BLD AUTO: 11.2 FL (ref 9.2–12.9)
POTASSIUM SERPL-SCNC: 4.1 MMOL/L (ref 3.5–5.1)
PROT SERPL-MCNC: 6.4 G/DL (ref 6–8.4)
RBC # BLD AUTO: 5.22 M/UL (ref 4.6–6.2)
SODIUM SERPL-SCNC: 138 MMOL/L (ref 136–145)
TRIGL SERPL-MCNC: 110 MG/DL (ref 30–150)
TSH SERPL DL<=0.005 MIU/L-ACNC: 1.01 UIU/ML (ref 0.4–4)
WBC # BLD AUTO: 6.17 K/UL (ref 3.9–12.7)

## 2021-03-01 PROCEDURE — 84443 ASSAY THYROID STIM HORMONE: CPT

## 2021-03-01 PROCEDURE — 83036 HEMOGLOBIN GLYCOSYLATED A1C: CPT

## 2021-03-01 PROCEDURE — 85025 COMPLETE CBC W/AUTO DIFF WBC: CPT

## 2021-03-01 PROCEDURE — 80053 COMPREHEN METABOLIC PANEL: CPT

## 2021-03-01 PROCEDURE — 84153 ASSAY OF PSA TOTAL: CPT

## 2021-03-01 PROCEDURE — 80061 LIPID PANEL: CPT

## 2021-03-01 PROCEDURE — 82306 VITAMIN D 25 HYDROXY: CPT

## 2021-03-01 PROCEDURE — 36415 COLL VENOUS BLD VENIPUNCTURE: CPT

## 2021-03-03 ENCOUNTER — OFFICE VISIT (OUTPATIENT)
Dept: FAMILY MEDICINE | Facility: CLINIC | Age: 66
End: 2021-03-03
Payer: COMMERCIAL

## 2021-03-03 ENCOUNTER — TELEPHONE (OUTPATIENT)
Dept: FAMILY MEDICINE | Facility: CLINIC | Age: 66
End: 2021-03-03

## 2021-03-03 VITALS
BODY MASS INDEX: 31.42 KG/M2 | WEIGHT: 224.44 LBS | OXYGEN SATURATION: 97 % | DIASTOLIC BLOOD PRESSURE: 78 MMHG | HEIGHT: 71 IN | SYSTOLIC BLOOD PRESSURE: 132 MMHG | HEART RATE: 69 BPM

## 2021-03-03 DIAGNOSIS — N52.01 ERECTILE DYSFUNCTION DUE TO ARTERIAL INSUFFICIENCY: ICD-10-CM

## 2021-03-03 DIAGNOSIS — E78.2 COMBINED HYPERLIPIDEMIA: ICD-10-CM

## 2021-03-03 DIAGNOSIS — Z23 HIGH PRIORITY FOR COVID-19 VIRUS VACCINATION: ICD-10-CM

## 2021-03-03 DIAGNOSIS — Z79.899 MEDICATION MANAGEMENT: ICD-10-CM

## 2021-03-03 DIAGNOSIS — Z79.82 LONG-TERM USE OF ASPIRIN THERAPY: ICD-10-CM

## 2021-03-03 DIAGNOSIS — F17.200 CURRENT EVERY DAY SMOKER: ICD-10-CM

## 2021-03-03 DIAGNOSIS — R73.03 PREDIABETES: ICD-10-CM

## 2021-03-03 DIAGNOSIS — Z00.00 ROUTINE GENERAL MEDICAL EXAMINATION AT A HEALTH CARE FACILITY: Primary | ICD-10-CM

## 2021-03-03 DIAGNOSIS — E55.9 VITAMIN D DEFICIENCY: ICD-10-CM

## 2021-03-03 DIAGNOSIS — F41.9 ANXIETY: ICD-10-CM

## 2021-03-03 DIAGNOSIS — F33.1 MAJOR DEPRESSIVE DISORDER, RECURRENT, MODERATE: ICD-10-CM

## 2021-03-03 DIAGNOSIS — E66.09 CLASS 1 OBESITY DUE TO EXCESS CALORIES WITH SERIOUS COMORBIDITY AND BODY MASS INDEX (BMI) OF 31.0 TO 31.9 IN ADULT: ICD-10-CM

## 2021-03-03 DIAGNOSIS — H25.13 NUCLEAR SCLEROSIS, BILATERAL: ICD-10-CM

## 2021-03-03 DIAGNOSIS — G47.33 OSA ON CPAP: ICD-10-CM

## 2021-03-03 DIAGNOSIS — Z86.010 HISTORY OF COLON POLYPS: ICD-10-CM

## 2021-03-03 PROCEDURE — 99396 PR PREVENTIVE VISIT,EST,40-64: ICD-10-PCS | Mod: S$GLB,,, | Performed by: FAMILY MEDICINE

## 2021-03-03 PROCEDURE — 99396 PREV VISIT EST AGE 40-64: CPT | Mod: S$GLB,,, | Performed by: FAMILY MEDICINE

## 2021-03-03 PROCEDURE — 3008F PR BODY MASS INDEX (BMI) DOCUMENTED: ICD-10-PCS | Mod: CPTII,S$GLB,, | Performed by: FAMILY MEDICINE

## 2021-03-03 PROCEDURE — 99999 PR PBB SHADOW E&M-EST. PATIENT-LVL IV: CPT | Mod: PBBFAC,,, | Performed by: FAMILY MEDICINE

## 2021-03-03 PROCEDURE — 3008F BODY MASS INDEX DOCD: CPT | Mod: CPTII,S$GLB,, | Performed by: FAMILY MEDICINE

## 2021-03-03 PROCEDURE — 99999 PR PBB SHADOW E&M-EST. PATIENT-LVL IV: ICD-10-PCS | Mod: PBBFAC,,, | Performed by: FAMILY MEDICINE

## 2021-03-03 RX ORDER — IBUPROFEN 200 MG
1 TABLET ORAL DAILY
Qty: 28 PATCH | Refills: 11 | Status: SHIPPED | OUTPATIENT
Start: 2021-03-03 | End: 2021-12-06

## 2021-03-03 RX ORDER — SILDENAFIL 100 MG/1
100 TABLET, FILM COATED ORAL DAILY PRN
Qty: 9 TABLET | Refills: 11 | Status: SHIPPED | OUTPATIENT
Start: 2021-03-03 | End: 2023-11-13 | Stop reason: SDUPTHER

## 2021-03-19 ENCOUNTER — IMMUNIZATION (OUTPATIENT)
Dept: PHARMACY | Facility: CLINIC | Age: 66
End: 2021-03-19
Payer: COMMERCIAL

## 2021-03-19 DIAGNOSIS — Z23 NEED FOR VACCINATION: Primary | ICD-10-CM

## 2021-04-16 ENCOUNTER — IMMUNIZATION (OUTPATIENT)
Dept: PHARMACY | Facility: CLINIC | Age: 66
End: 2021-04-16
Payer: COMMERCIAL

## 2021-04-16 DIAGNOSIS — Z23 NEED FOR VACCINATION: Primary | ICD-10-CM

## 2021-06-16 NOTE — PATIENT INSTRUCTIONS
Add: 6/15/21 had corrected Charge code and refilled. Checked Trust to see if benefits are active and benefit period 11/27/18-3/27/19

## 2021-09-08 ENCOUNTER — PATIENT MESSAGE (OUTPATIENT)
Dept: FAMILY MEDICINE | Facility: CLINIC | Age: 66
End: 2021-09-08

## 2021-10-26 ENCOUNTER — LAB VISIT (OUTPATIENT)
Dept: LAB | Facility: HOSPITAL | Age: 66
End: 2021-10-26
Attending: FAMILY MEDICINE
Payer: COMMERCIAL

## 2021-10-26 ENCOUNTER — PATIENT MESSAGE (OUTPATIENT)
Dept: FAMILY MEDICINE | Facility: CLINIC | Age: 66
End: 2021-10-26
Payer: COMMERCIAL

## 2021-10-26 DIAGNOSIS — R73.03 PREDIABETES: ICD-10-CM

## 2021-10-26 DIAGNOSIS — Z00.00 ROUTINE GENERAL MEDICAL EXAMINATION AT A HEALTH CARE FACILITY: ICD-10-CM

## 2021-10-26 LAB
ALBUMIN SERPL BCP-MCNC: 3.7 G/DL (ref 3.5–5.2)
ALP SERPL-CCNC: 101 U/L (ref 55–135)
ALT SERPL W/O P-5'-P-CCNC: 18 U/L (ref 10–44)
ANION GAP SERPL CALC-SCNC: 8 MMOL/L (ref 8–16)
AST SERPL-CCNC: 19 U/L (ref 10–40)
BILIRUB SERPL-MCNC: 0.4 MG/DL (ref 0.1–1)
BUN SERPL-MCNC: 16 MG/DL (ref 8–23)
CALCIUM SERPL-MCNC: 9 MG/DL (ref 8.7–10.5)
CHLORIDE SERPL-SCNC: 109 MMOL/L (ref 95–110)
CHOLEST SERPL-MCNC: 159 MG/DL (ref 120–199)
CHOLEST/HDLC SERPL: 4.7 {RATIO} (ref 2–5)
CO2 SERPL-SCNC: 23 MMOL/L (ref 23–29)
CREAT SERPL-MCNC: 1 MG/DL (ref 0.5–1.4)
EST. GFR  (AFRICAN AMERICAN): >60 ML/MIN/1.73 M^2
EST. GFR  (NON AFRICAN AMERICAN): >60 ML/MIN/1.73 M^2
ESTIMATED AVG GLUCOSE: 114 MG/DL (ref 68–131)
GLUCOSE SERPL-MCNC: 109 MG/DL (ref 70–110)
HBA1C MFR BLD: 5.6 % (ref 4–5.6)
HDLC SERPL-MCNC: 34 MG/DL (ref 40–75)
HDLC SERPL: 21.4 % (ref 20–50)
LDLC SERPL CALC-MCNC: 82 MG/DL (ref 63–159)
NONHDLC SERPL-MCNC: 125 MG/DL
POTASSIUM SERPL-SCNC: 4.2 MMOL/L (ref 3.5–5.1)
PROT SERPL-MCNC: 6.4 G/DL (ref 6–8.4)
SODIUM SERPL-SCNC: 140 MMOL/L (ref 136–145)
TRIGL SERPL-MCNC: 215 MG/DL (ref 30–150)

## 2021-10-26 PROCEDURE — 83036 HEMOGLOBIN GLYCOSYLATED A1C: CPT | Performed by: FAMILY MEDICINE

## 2021-10-26 PROCEDURE — 80053 COMPREHEN METABOLIC PANEL: CPT | Performed by: FAMILY MEDICINE

## 2021-10-26 PROCEDURE — 36415 COLL VENOUS BLD VENIPUNCTURE: CPT | Performed by: FAMILY MEDICINE

## 2021-10-26 PROCEDURE — 80061 LIPID PANEL: CPT | Performed by: FAMILY MEDICINE

## 2021-10-29 ENCOUNTER — OFFICE VISIT (OUTPATIENT)
Dept: FAMILY MEDICINE | Facility: CLINIC | Age: 66
End: 2021-10-29
Payer: COMMERCIAL

## 2021-10-29 VITALS
SYSTOLIC BLOOD PRESSURE: 122 MMHG | OXYGEN SATURATION: 98 % | WEIGHT: 216.94 LBS | HEIGHT: 71 IN | BODY MASS INDEX: 30.37 KG/M2 | HEART RATE: 68 BPM | DIASTOLIC BLOOD PRESSURE: 54 MMHG

## 2021-10-29 DIAGNOSIS — E66.09 CLASS 1 OBESITY DUE TO EXCESS CALORIES WITH SERIOUS COMORBIDITY AND BODY MASS INDEX (BMI) OF 30.0 TO 30.9 IN ADULT: ICD-10-CM

## 2021-10-29 DIAGNOSIS — R73.03 PREDIABETES: ICD-10-CM

## 2021-10-29 DIAGNOSIS — Z23 NEEDS FLU SHOT: ICD-10-CM

## 2021-10-29 DIAGNOSIS — Z79.82 LONG-TERM USE OF ASPIRIN THERAPY: ICD-10-CM

## 2021-10-29 DIAGNOSIS — M79.89 SOFT TISSUE MASS: ICD-10-CM

## 2021-10-29 DIAGNOSIS — Z79.899 MEDICATION MANAGEMENT: ICD-10-CM

## 2021-10-29 DIAGNOSIS — I83.813 VARICOSE VEINS OF LEG WITH PAIN, BILATERAL: ICD-10-CM

## 2021-10-29 DIAGNOSIS — Z87.891 FORMER SMOKER: ICD-10-CM

## 2021-10-29 DIAGNOSIS — E78.2 COMBINED HYPERLIPIDEMIA: Primary | ICD-10-CM

## 2021-10-29 DIAGNOSIS — Z71.6 TOBACCO ABUSE COUNSELING: ICD-10-CM

## 2021-10-29 DIAGNOSIS — F33.1 MAJOR DEPRESSIVE DISORDER, RECURRENT, MODERATE: ICD-10-CM

## 2021-10-29 DIAGNOSIS — G47.33 OSA ON CPAP: ICD-10-CM

## 2021-10-29 DIAGNOSIS — E55.9 VITAMIN D DEFICIENCY: ICD-10-CM

## 2021-10-29 DIAGNOSIS — Z12.5 ENCOUNTER FOR SCREENING FOR MALIGNANT NEOPLASM OF PROSTATE: ICD-10-CM

## 2021-10-29 DIAGNOSIS — F41.9 ANXIETY: ICD-10-CM

## 2021-10-29 PROCEDURE — 90686 IIV4 VACC NO PRSV 0.5 ML IM: CPT | Mod: S$GLB,,, | Performed by: FAMILY MEDICINE

## 2021-10-29 PROCEDURE — 99999 PR PBB SHADOW E&M-EST. PATIENT-LVL V: CPT | Mod: PBBFAC,,, | Performed by: FAMILY MEDICINE

## 2021-10-29 PROCEDURE — 3044F PR MOST RECENT HEMOGLOBIN A1C LEVEL <7.0%: ICD-10-PCS | Mod: CPTII,S$GLB,, | Performed by: FAMILY MEDICINE

## 2021-10-29 PROCEDURE — 99214 PR OFFICE/OUTPT VISIT, EST, LEVL IV, 30-39 MIN: ICD-10-PCS | Mod: 25,S$GLB,, | Performed by: FAMILY MEDICINE

## 2021-10-29 PROCEDURE — 90686 FLU VACCINE (QUAD) GREATER THAN OR EQUAL TO 3YO PRESERVATIVE FREE IM: ICD-10-PCS | Mod: S$GLB,,, | Performed by: FAMILY MEDICINE

## 2021-10-29 PROCEDURE — 3078F PR MOST RECENT DIASTOLIC BLOOD PRESSURE < 80 MM HG: ICD-10-PCS | Mod: CPTII,S$GLB,, | Performed by: FAMILY MEDICINE

## 2021-10-29 PROCEDURE — 1159F PR MEDICATION LIST DOCUMENTED IN MEDICAL RECORD: ICD-10-PCS | Mod: CPTII,S$GLB,, | Performed by: FAMILY MEDICINE

## 2021-10-29 PROCEDURE — 3008F PR BODY MASS INDEX (BMI) DOCUMENTED: ICD-10-PCS | Mod: CPTII,S$GLB,, | Performed by: FAMILY MEDICINE

## 2021-10-29 PROCEDURE — 99999 PR PBB SHADOW E&M-EST. PATIENT-LVL V: ICD-10-PCS | Mod: PBBFAC,,, | Performed by: FAMILY MEDICINE

## 2021-10-29 PROCEDURE — 3078F DIAST BP <80 MM HG: CPT | Mod: CPTII,S$GLB,, | Performed by: FAMILY MEDICINE

## 2021-10-29 PROCEDURE — 3074F PR MOST RECENT SYSTOLIC BLOOD PRESSURE < 130 MM HG: ICD-10-PCS | Mod: CPTII,S$GLB,, | Performed by: FAMILY MEDICINE

## 2021-10-29 PROCEDURE — 3044F HG A1C LEVEL LT 7.0%: CPT | Mod: CPTII,S$GLB,, | Performed by: FAMILY MEDICINE

## 2021-10-29 PROCEDURE — 1160F RVW MEDS BY RX/DR IN RCRD: CPT | Mod: CPTII,S$GLB,, | Performed by: FAMILY MEDICINE

## 2021-10-29 PROCEDURE — 3074F SYST BP LT 130 MM HG: CPT | Mod: CPTII,S$GLB,, | Performed by: FAMILY MEDICINE

## 2021-10-29 PROCEDURE — 3008F BODY MASS INDEX DOCD: CPT | Mod: CPTII,S$GLB,, | Performed by: FAMILY MEDICINE

## 2021-10-29 PROCEDURE — 90471 IMMUNIZATION ADMIN: CPT | Mod: S$GLB,,, | Performed by: FAMILY MEDICINE

## 2021-10-29 PROCEDURE — 1160F PR REVIEW ALL MEDS BY PRESCRIBER/CLIN PHARMACIST DOCUMENTED: ICD-10-PCS | Mod: CPTII,S$GLB,, | Performed by: FAMILY MEDICINE

## 2021-10-29 PROCEDURE — 1159F MED LIST DOCD IN RCRD: CPT | Mod: CPTII,S$GLB,, | Performed by: FAMILY MEDICINE

## 2021-10-29 PROCEDURE — 99214 OFFICE O/P EST MOD 30 MIN: CPT | Mod: 25,S$GLB,, | Performed by: FAMILY MEDICINE

## 2021-10-29 PROCEDURE — 90471 FLU VACCINE (QUAD) GREATER THAN OR EQUAL TO 3YO PRESERVATIVE FREE IM: ICD-10-PCS | Mod: S$GLB,,, | Performed by: FAMILY MEDICINE

## 2021-10-29 RX ORDER — BUPROPION HYDROCHLORIDE 150 MG/1
150 TABLET ORAL
Qty: 90 TABLET | Refills: 4 | Status: SHIPPED | OUTPATIENT
Start: 2021-10-29 | End: 2022-04-29

## 2021-11-02 ENCOUNTER — OFFICE VISIT (OUTPATIENT)
Dept: SURGERY | Facility: CLINIC | Age: 66
End: 2021-11-02
Payer: COMMERCIAL

## 2021-11-02 VITALS
WEIGHT: 206.25 LBS | HEART RATE: 61 BPM | BODY MASS INDEX: 28.87 KG/M2 | SYSTOLIC BLOOD PRESSURE: 116 MMHG | HEIGHT: 71 IN | DIASTOLIC BLOOD PRESSURE: 61 MMHG

## 2021-11-02 DIAGNOSIS — M79.89 SOFT TISSUE MASS: ICD-10-CM

## 2021-11-02 PROCEDURE — 3008F PR BODY MASS INDEX (BMI) DOCUMENTED: ICD-10-PCS | Mod: CPTII,S$GLB,, | Performed by: SURGERY

## 2021-11-02 PROCEDURE — 11403 PR EXC SKIN BENIG 2.1-3 CM TRUNK,ARM,LEG: ICD-10-PCS | Mod: 51,S$GLB,, | Performed by: SURGERY

## 2021-11-02 PROCEDURE — 3044F HG A1C LEVEL LT 7.0%: CPT | Mod: CPTII,S$GLB,, | Performed by: SURGERY

## 2021-11-02 PROCEDURE — 11401 PR EXC SKIN BENIG 0.6-1 CM TRUNK,ARM,LEG: ICD-10-PCS | Mod: 51,S$GLB,, | Performed by: SURGERY

## 2021-11-02 PROCEDURE — 3078F DIAST BP <80 MM HG: CPT | Mod: CPTII,S$GLB,, | Performed by: SURGERY

## 2021-11-02 PROCEDURE — 3008F BODY MASS INDEX DOCD: CPT | Mod: CPTII,S$GLB,, | Performed by: SURGERY

## 2021-11-02 PROCEDURE — 1159F MED LIST DOCD IN RCRD: CPT | Mod: CPTII,S$GLB,, | Performed by: SURGERY

## 2021-11-02 PROCEDURE — 3074F SYST BP LT 130 MM HG: CPT | Mod: CPTII,S$GLB,, | Performed by: SURGERY

## 2021-11-02 PROCEDURE — 3044F PR MOST RECENT HEMOGLOBIN A1C LEVEL <7.0%: ICD-10-PCS | Mod: CPTII,S$GLB,, | Performed by: SURGERY

## 2021-11-02 PROCEDURE — 3078F PR MOST RECENT DIASTOLIC BLOOD PRESSURE < 80 MM HG: ICD-10-PCS | Mod: CPTII,S$GLB,, | Performed by: SURGERY

## 2021-11-02 PROCEDURE — 11406 PR EXC SKIN BENIG >4 CM TRUNK,ARM,LEG: ICD-10-PCS | Mod: S$GLB,,, | Performed by: SURGERY

## 2021-11-02 PROCEDURE — 99202 PR OFFICE/OUTPT VISIT, NEW, LEVL II, 15-29 MIN: ICD-10-PCS | Mod: 25,S$GLB,, | Performed by: SURGERY

## 2021-11-02 PROCEDURE — 1159F PR MEDICATION LIST DOCUMENTED IN MEDICAL RECORD: ICD-10-PCS | Mod: CPTII,S$GLB,, | Performed by: SURGERY

## 2021-11-02 PROCEDURE — 11401 EXC TR-EXT B9+MARG 0.6-1 CM: CPT | Mod: 51,S$GLB,, | Performed by: SURGERY

## 2021-11-02 PROCEDURE — 11403 EXC TR-EXT B9+MARG 2.1-3CM: CPT | Mod: 51,S$GLB,, | Performed by: SURGERY

## 2021-11-02 PROCEDURE — 99202 OFFICE O/P NEW SF 15 MIN: CPT | Mod: 25,S$GLB,, | Performed by: SURGERY

## 2021-11-02 PROCEDURE — 99999 PR PBB SHADOW E&M-EST. PATIENT-LVL III: ICD-10-PCS | Mod: PBBFAC,,, | Performed by: SURGERY

## 2021-11-02 PROCEDURE — 11406 EXC TR-EXT B9+MARG >4.0 CM: CPT | Mod: S$GLB,,, | Performed by: SURGERY

## 2021-11-02 PROCEDURE — 3074F PR MOST RECENT SYSTOLIC BLOOD PRESSURE < 130 MM HG: ICD-10-PCS | Mod: CPTII,S$GLB,, | Performed by: SURGERY

## 2021-11-02 PROCEDURE — 99999 PR PBB SHADOW E&M-EST. PATIENT-LVL III: CPT | Mod: PBBFAC,,, | Performed by: SURGERY

## 2021-11-09 ENCOUNTER — TELEPHONE (OUTPATIENT)
Dept: CARDIOLOGY | Facility: CLINIC | Age: 66
End: 2021-11-09
Payer: COMMERCIAL

## 2021-11-09 DIAGNOSIS — M79.89 LEG SWELLING: ICD-10-CM

## 2021-11-09 DIAGNOSIS — M79.604 PAIN IN BOTH LOWER EXTREMITIES: ICD-10-CM

## 2021-11-09 DIAGNOSIS — R07.9 CHEST PAIN, UNSPECIFIED TYPE: ICD-10-CM

## 2021-11-09 DIAGNOSIS — M79.605 PAIN IN BOTH LOWER EXTREMITIES: ICD-10-CM

## 2021-11-09 DIAGNOSIS — I83.813 VARICOSE VEINS OF LEG WITH PAIN, BILATERAL: Primary | ICD-10-CM

## 2021-11-10 ENCOUNTER — TELEPHONE (OUTPATIENT)
Dept: CARDIOLOGY | Facility: CLINIC | Age: 66
End: 2021-11-10
Payer: COMMERCIAL

## 2021-11-29 ENCOUNTER — HOSPITAL ENCOUNTER (OUTPATIENT)
Dept: CARDIOLOGY | Facility: HOSPITAL | Age: 66
Discharge: HOME | End: 2021-11-29
Attending: INTERNAL MEDICINE
Payer: COMMERCIAL

## 2021-11-29 ENCOUNTER — HOSPITAL ENCOUNTER (OUTPATIENT)
Dept: RADIOLOGY | Facility: HOSPITAL | Age: 66
Discharge: HOME | End: 2021-11-29
Attending: INTERNAL MEDICINE
Payer: COMMERCIAL

## 2021-11-29 VITALS — BODY MASS INDEX: 28.84 KG/M2 | WEIGHT: 206 LBS | HEIGHT: 71 IN

## 2021-11-29 DIAGNOSIS — M79.89 LEG SWELLING: ICD-10-CM

## 2021-11-29 DIAGNOSIS — I83.813 VARICOSE VEINS OF LEG WITH PAIN, BILATERAL: ICD-10-CM

## 2021-11-29 DIAGNOSIS — R07.9 CHEST PAIN, UNSPECIFIED TYPE: ICD-10-CM

## 2021-11-29 DIAGNOSIS — M79.604 PAIN IN BOTH LOWER EXTREMITIES: ICD-10-CM

## 2021-11-29 DIAGNOSIS — M79.605 PAIN IN BOTH LOWER EXTREMITIES: ICD-10-CM

## 2021-11-29 LAB
AORTIC ROOT ANNULUS: 3.17 CM
AORTIC VALVE CUSP SEPERATION: 2.19 CM
AV INDEX (PROSTH): 0.67
AV MEAN GRADIENT: 4 MMHG
AV PEAK GRADIENT: 8 MMHG
AV VALVE AREA: 2.19 CM2
AV VELOCITY RATIO: 0.66
BSA FOR ECHO PROCEDURE: 2.16 M2
CV ECHO LV RWT: 0.31 CM
DOP CALC AO PEAK VEL: 1.37 M/S
DOP CALC AO VTI: 29.95 CM
DOP CALC LVOT AREA: 3.3 CM2
DOP CALC LVOT DIAMETER: 2.04 CM
DOP CALC LVOT PEAK VEL: 0.91 M/S
DOP CALC LVOT STROKE VOLUME: 65.63 CM3
DOP CALC MV VTI: 30.48 CM
DOP CALCLVOT PEAK VEL VTI: 20.09 CM
E WAVE DECELERATION TIME: 245.85 MSEC
E/A RATIO: 0.99
E/E' RATIO: 7.89 M/S
ECHO LV POSTERIOR WALL: 0.85 CM (ref 0.6–1.1)
EJECTION FRACTION: 60 %
FRACTIONAL SHORTENING: 38 % (ref 28–44)
INTERVENTRICULAR SEPTUM: 1.1 CM (ref 0.6–1.1)
IVRT: 91.34 MSEC
LA MAJOR: 5.23 CM
LA MINOR: 5.49 CM
LA WIDTH: 3.57 CM
LEFT ATRIUM SIZE: 3.63 CM
LEFT ATRIUM VOLUME INDEX MOD: 13 ML/M2
LEFT ATRIUM VOLUME INDEX: 27.6 ML/M2
LEFT ATRIUM VOLUME MOD: 27.74 CM3
LEFT ATRIUM VOLUME: 59.01 CM3
LEFT INTERNAL DIMENSION IN SYSTOLE: 3.4 CM (ref 2.1–4)
LEFT VENTRICLE DIASTOLIC VOLUME INDEX: 77.34 ML/M2
LEFT VENTRICLE DIASTOLIC VOLUME: 165.5 ML
LEFT VENTRICLE MASS INDEX: 96 G/M2
LEFT VENTRICLE SYSTOLIC VOLUME INDEX: 30.2 ML/M2
LEFT VENTRICLE SYSTOLIC VOLUME: 64.64 ML
LEFT VENTRICULAR INTERNAL DIMENSION IN DIASTOLE: 5.5 CM (ref 3.5–6)
LEFT VENTRICULAR MASS: 206.2 G
LV LATERAL E/E' RATIO: 6.45 M/S
LV SEPTAL E/E' RATIO: 10.14 M/S
MV A" WAVE DURATION": 14.46 MSEC
MV MEAN GRADIENT: 0 MMHG
MV PEAK A VEL: 0.72 M/S
MV PEAK E VEL: 0.71 M/S
MV PEAK GRADIENT: 3 MMHG
MV STENOSIS PRESSURE HALF TIME: 71.3 MS
MV VALVE AREA BY CONTINUITY EQUATION: 2.15 CM2
MV VALVE AREA P 1/2 METHOD: 3.09 CM2
PISA TR MAX VEL: 2.11 M/S
PULM VEIN S/D RATIO: 1
PV PEAK D VEL: 0.34 M/S
PV PEAK S VEL: 0.34 M/S
PV PEAK VELOCITY: 1.12 CM/S
RA MAJOR: 5.1 CM
RA PRESSURE: 8 MMHG
RA WIDTH: 4 CM
RIGHT VENTRICULAR END-DIASTOLIC DIMENSION: 2.96 CM
TDI LATERAL: 0.11 M/S
TDI SEPTAL: 0.07 M/S
TDI: 0.09 M/S
TR MAX PG: 18 MMHG
TV REST PULMONARY ARTERY PRESSURE: 26 MMHG

## 2021-11-29 PROCEDURE — 93924 ANKLE BRACHIAL INDICES (ABI): ICD-10-PCS | Mod: 26,,, | Performed by: INTERNAL MEDICINE

## 2021-11-29 PROCEDURE — 93306 TTE W/DOPPLER COMPLETE: CPT | Mod: 26,,, | Performed by: INTERNAL MEDICINE

## 2021-11-29 PROCEDURE — 93970 EXTREMITY STUDY: CPT | Mod: 26,,, | Performed by: RADIOLOGY

## 2021-11-29 PROCEDURE — 93970 EXTREMITY STUDY: CPT | Mod: TC

## 2021-11-29 PROCEDURE — 93970 US LOWER EXTREMITY VEINS BILATERAL: ICD-10-PCS | Mod: 26,,, | Performed by: RADIOLOGY

## 2021-11-29 PROCEDURE — 93924 LWR XTR VASC STDY BILAT: CPT | Mod: 26,,, | Performed by: INTERNAL MEDICINE

## 2021-11-29 PROCEDURE — 93924 LWR XTR VASC STDY BILAT: CPT

## 2021-11-29 PROCEDURE — 93306 TTE W/DOPPLER COMPLETE: CPT

## 2021-11-29 PROCEDURE — 93306 ECHO (CUPID ONLY): ICD-10-PCS | Mod: 26,,, | Performed by: INTERNAL MEDICINE

## 2021-12-02 LAB
IMMEDIATE ARM BP: 124 MMHG
IMMEDIATE LEFT ABI: 1.41
IMMEDIATE LEFT TIBIAL: 175 MMHG
IMMEDIATE RIGHT ABI: 1.01
IMMEDIATE RIGHT TIBIAL: 125 MMHG
LEFT ABI: 1.19
LEFT ARM BP: 123 MMHG
LEFT DORSALIS PEDIS: 129 MMHG
LEFT POSTERIOR TIBIAL: 148 MMHG
RIGHT ABI: 1.2
RIGHT ARM BP: 124 MMHG
RIGHT DORSALIS PEDIS: 122 MMHG
RIGHT POSTERIOR TIBIAL: 149 MMHG
TREADMILL GRADE: 10 %
TREADMILL SPEED: 2 MPH
TREADMILL TIME: 5 MIN

## 2021-12-06 ENCOUNTER — OFFICE VISIT (OUTPATIENT)
Dept: CARDIOLOGY | Facility: CLINIC | Age: 66
End: 2021-12-06
Payer: COMMERCIAL

## 2021-12-06 VITALS
SYSTOLIC BLOOD PRESSURE: 115 MMHG | WEIGHT: 211 LBS | HEART RATE: 60 BPM | HEIGHT: 71 IN | BODY MASS INDEX: 29.54 KG/M2 | DIASTOLIC BLOOD PRESSURE: 62 MMHG

## 2021-12-06 DIAGNOSIS — Z72.0 TOBACCO ABUSE: ICD-10-CM

## 2021-12-06 DIAGNOSIS — I83.813 VARICOSE VEINS OF LEG WITH PAIN, BILATERAL: ICD-10-CM

## 2021-12-06 DIAGNOSIS — R73.03 PREDIABETES: ICD-10-CM

## 2021-12-06 DIAGNOSIS — E78.2 COMBINED HYPERLIPIDEMIA: ICD-10-CM

## 2021-12-06 DIAGNOSIS — G47.33 OSA ON CPAP: ICD-10-CM

## 2021-12-06 DIAGNOSIS — Z91.89 FRAMINGHAM CARDIAC RISK >20% IN NEXT 10 YEARS: ICD-10-CM

## 2021-12-06 DIAGNOSIS — I87.2 VENOUS INSUFFICIENCY OF LOWER EXTREMITY, UNSPECIFIED LATERALITY: Primary | ICD-10-CM

## 2021-12-06 PROCEDURE — 99999 PR PBB SHADOW E&M-EST. PATIENT-LVL V: CPT | Mod: PBBFAC,,, | Performed by: INTERNAL MEDICINE

## 2021-12-06 PROCEDURE — 99205 OFFICE O/P NEW HI 60 MIN: CPT | Mod: S$GLB,,, | Performed by: INTERNAL MEDICINE

## 2021-12-06 PROCEDURE — 99999 PR PBB SHADOW E&M-EST. PATIENT-LVL V: ICD-10-PCS | Mod: PBBFAC,,, | Performed by: INTERNAL MEDICINE

## 2021-12-06 PROCEDURE — 99205 PR OFFICE/OUTPT VISIT, NEW, LEVL V, 60-74 MIN: ICD-10-PCS | Mod: S$GLB,,, | Performed by: INTERNAL MEDICINE

## 2021-12-22 ENCOUNTER — IMMUNIZATION (OUTPATIENT)
Dept: PHARMACY | Facility: CLINIC | Age: 66
End: 2021-12-22
Payer: COMMERCIAL

## 2021-12-22 DIAGNOSIS — Z23 NEED FOR VACCINATION: Primary | ICD-10-CM

## 2021-12-22 DIAGNOSIS — F41.9 ANXIETY: ICD-10-CM

## 2021-12-23 RX ORDER — ALPRAZOLAM 0.5 MG/1
0.5 TABLET ORAL NIGHTLY
Qty: 30 TABLET | Refills: 5 | Status: SHIPPED | OUTPATIENT
Start: 2021-12-23 | End: 2022-03-03

## 2022-01-26 ENCOUNTER — PATIENT MESSAGE (OUTPATIENT)
Dept: FAMILY MEDICINE | Facility: CLINIC | Age: 67
End: 2022-01-26
Payer: COMMERCIAL

## 2022-01-28 ENCOUNTER — PATIENT MESSAGE (OUTPATIENT)
Dept: FAMILY MEDICINE | Facility: CLINIC | Age: 67
End: 2022-01-28
Payer: COMMERCIAL

## 2022-01-28 NOTE — TELEPHONE ENCOUNTER
I have completed these forms for his concealed carry medical release.  Placed on your desk to see how patient would like to retrieve them, thanks

## 2022-04-19 ENCOUNTER — PATIENT MESSAGE (OUTPATIENT)
Dept: FAMILY MEDICINE | Facility: CLINIC | Age: 67
End: 2022-04-19
Payer: COMMERCIAL

## 2022-04-22 ENCOUNTER — LAB VISIT (OUTPATIENT)
Dept: LAB | Facility: HOSPITAL | Age: 67
End: 2022-04-22
Attending: FAMILY MEDICINE
Payer: COMMERCIAL

## 2022-04-22 DIAGNOSIS — Z79.899 MEDICATION MANAGEMENT: ICD-10-CM

## 2022-04-22 DIAGNOSIS — R73.03 PREDIABETES: ICD-10-CM

## 2022-04-22 DIAGNOSIS — E55.9 VITAMIN D DEFICIENCY: ICD-10-CM

## 2022-04-22 DIAGNOSIS — E78.2 COMBINED HYPERLIPIDEMIA: ICD-10-CM

## 2022-04-22 LAB
25(OH)D3+25(OH)D2 SERPL-MCNC: 45 NG/ML (ref 30–96)
ALBUMIN SERPL BCP-MCNC: 3.9 G/DL (ref 3.5–5.2)
ALP SERPL-CCNC: 107 U/L (ref 55–135)
ALT SERPL W/O P-5'-P-CCNC: 18 U/L (ref 10–44)
ANION GAP SERPL CALC-SCNC: 11 MMOL/L (ref 8–16)
AST SERPL-CCNC: 18 U/L (ref 10–40)
BASOPHILS # BLD AUTO: 0.05 K/UL (ref 0–0.2)
BASOPHILS NFR BLD: 0.6 % (ref 0–1.9)
BILIRUB SERPL-MCNC: 0.3 MG/DL (ref 0.1–1)
BUN SERPL-MCNC: 18 MG/DL (ref 8–23)
CALCIUM SERPL-MCNC: 9.4 MG/DL (ref 8.7–10.5)
CHLORIDE SERPL-SCNC: 108 MMOL/L (ref 95–110)
CHOLEST SERPL-MCNC: 135 MG/DL (ref 120–199)
CHOLEST/HDLC SERPL: 4.7 {RATIO} (ref 2–5)
CO2 SERPL-SCNC: 22 MMOL/L (ref 23–29)
CREAT SERPL-MCNC: 1 MG/DL (ref 0.5–1.4)
DIFFERENTIAL METHOD: NORMAL
EOSINOPHIL # BLD AUTO: 0.4 K/UL (ref 0–0.5)
EOSINOPHIL NFR BLD: 4.4 % (ref 0–8)
ERYTHROCYTE [DISTWIDTH] IN BLOOD BY AUTOMATED COUNT: 14.1 % (ref 11.5–14.5)
EST. GFR  (AFRICAN AMERICAN): >60 ML/MIN/1.73 M^2
EST. GFR  (NON AFRICAN AMERICAN): >60 ML/MIN/1.73 M^2
ESTIMATED AVG GLUCOSE: 117 MG/DL (ref 68–131)
GLUCOSE SERPL-MCNC: 95 MG/DL (ref 70–110)
HBA1C MFR BLD: 5.7 % (ref 4–5.6)
HCT VFR BLD AUTO: 46.4 % (ref 40–54)
HDLC SERPL-MCNC: 29 MG/DL (ref 40–75)
HDLC SERPL: 21.5 % (ref 20–50)
HGB BLD-MCNC: 15 G/DL (ref 14–18)
IMM GRANULOCYTES # BLD AUTO: 0.02 K/UL (ref 0–0.04)
IMM GRANULOCYTES NFR BLD AUTO: 0.2 % (ref 0–0.5)
LDLC SERPL CALC-MCNC: 54 MG/DL (ref 63–159)
LYMPHOCYTES # BLD AUTO: 1.8 K/UL (ref 1–4.8)
LYMPHOCYTES NFR BLD: 22.1 % (ref 18–48)
MAGNESIUM SERPL-MCNC: 2.1 MG/DL (ref 1.6–2.6)
MCH RBC QN AUTO: 29.1 PG (ref 27–31)
MCHC RBC AUTO-ENTMCNC: 32.3 G/DL (ref 32–36)
MCV RBC AUTO: 90 FL (ref 82–98)
MONOCYTES # BLD AUTO: 0.7 K/UL (ref 0.3–1)
MONOCYTES NFR BLD: 8.1 % (ref 4–15)
NEUTROPHILS # BLD AUTO: 5.2 K/UL (ref 1.8–7.7)
NEUTROPHILS NFR BLD: 64.6 % (ref 38–73)
NONHDLC SERPL-MCNC: 106 MG/DL
NRBC BLD-RTO: 0 /100 WBC
PLATELET # BLD AUTO: 197 K/UL (ref 150–450)
PMV BLD AUTO: 11 FL (ref 9.2–12.9)
POTASSIUM SERPL-SCNC: 4.2 MMOL/L (ref 3.5–5.1)
PROT SERPL-MCNC: 6.9 G/DL (ref 6–8.4)
RBC # BLD AUTO: 5.15 M/UL (ref 4.6–6.2)
SODIUM SERPL-SCNC: 141 MMOL/L (ref 136–145)
TRIGL SERPL-MCNC: 260 MG/DL (ref 30–150)
TSH SERPL DL<=0.005 MIU/L-ACNC: 2.23 UIU/ML (ref 0.4–4)
VIT B12 SERPL-MCNC: 351 PG/ML (ref 210–950)
WBC # BLD AUTO: 8.04 K/UL (ref 3.9–12.7)

## 2022-04-22 PROCEDURE — 83735 ASSAY OF MAGNESIUM: CPT | Performed by: FAMILY MEDICINE

## 2022-04-22 PROCEDURE — 85025 COMPLETE CBC W/AUTO DIFF WBC: CPT | Performed by: FAMILY MEDICINE

## 2022-04-22 PROCEDURE — 82607 VITAMIN B-12: CPT | Performed by: FAMILY MEDICINE

## 2022-04-22 PROCEDURE — 84443 ASSAY THYROID STIM HORMONE: CPT | Performed by: FAMILY MEDICINE

## 2022-04-22 PROCEDURE — 82306 VITAMIN D 25 HYDROXY: CPT | Performed by: FAMILY MEDICINE

## 2022-04-22 PROCEDURE — 80053 COMPREHEN METABOLIC PANEL: CPT | Performed by: FAMILY MEDICINE

## 2022-04-22 PROCEDURE — 36415 COLL VENOUS BLD VENIPUNCTURE: CPT | Performed by: FAMILY MEDICINE

## 2022-04-22 PROCEDURE — 83036 HEMOGLOBIN GLYCOSYLATED A1C: CPT | Performed by: FAMILY MEDICINE

## 2022-04-22 PROCEDURE — 80061 LIPID PANEL: CPT | Performed by: FAMILY MEDICINE

## 2022-04-29 ENCOUNTER — HOSPITAL ENCOUNTER (OUTPATIENT)
Dept: RADIOLOGY | Facility: HOSPITAL | Age: 67
Discharge: HOME | End: 2022-04-29
Attending: INTERNAL MEDICINE
Payer: COMMERCIAL

## 2022-04-29 ENCOUNTER — OFFICE VISIT (OUTPATIENT)
Dept: FAMILY MEDICINE | Facility: CLINIC | Age: 67
End: 2022-04-29
Payer: COMMERCIAL

## 2022-04-29 VITALS
SYSTOLIC BLOOD PRESSURE: 111 MMHG | BODY MASS INDEX: 30.37 KG/M2 | DIASTOLIC BLOOD PRESSURE: 65 MMHG | WEIGHT: 216.94 LBS | OXYGEN SATURATION: 97 % | HEIGHT: 71 IN | HEART RATE: 62 BPM | TEMPERATURE: 98 F

## 2022-04-29 DIAGNOSIS — G47.33 OSA ON CPAP: ICD-10-CM

## 2022-04-29 DIAGNOSIS — Z86.010 HISTORY OF COLON POLYPS: ICD-10-CM

## 2022-04-29 DIAGNOSIS — I83.813 VARICOSE VEINS OF LEG WITH PAIN, BILATERAL: ICD-10-CM

## 2022-04-29 DIAGNOSIS — R73.03 PREDIABETES: ICD-10-CM

## 2022-04-29 DIAGNOSIS — Z71.6 TOBACCO ABUSE COUNSELING: ICD-10-CM

## 2022-04-29 DIAGNOSIS — Z79.82 LONG-TERM USE OF ASPIRIN THERAPY: ICD-10-CM

## 2022-04-29 DIAGNOSIS — E78.2 COMBINED HYPERLIPIDEMIA: ICD-10-CM

## 2022-04-29 DIAGNOSIS — I87.2 VENOUS INSUFFICIENCY OF LOWER EXTREMITY, UNSPECIFIED LATERALITY: ICD-10-CM

## 2022-04-29 DIAGNOSIS — H25.13 NUCLEAR SCLEROSIS, BILATERAL: ICD-10-CM

## 2022-04-29 DIAGNOSIS — E55.9 VITAMIN D DEFICIENCY: ICD-10-CM

## 2022-04-29 DIAGNOSIS — F33.1 MAJOR DEPRESSIVE DISORDER, RECURRENT, MODERATE: ICD-10-CM

## 2022-04-29 DIAGNOSIS — Z12.5 SCREENING FOR MALIGNANT NEOPLASM OF PROSTATE: ICD-10-CM

## 2022-04-29 DIAGNOSIS — Z79.899 MEDICATION MANAGEMENT: ICD-10-CM

## 2022-04-29 DIAGNOSIS — Z00.00 ROUTINE GENERAL MEDICAL EXAMINATION AT A HEALTH CARE FACILITY: Primary | ICD-10-CM

## 2022-04-29 DIAGNOSIS — E66.09 CLASS 1 OBESITY DUE TO EXCESS CALORIES WITH SERIOUS COMORBIDITY AND BODY MASS INDEX (BMI) OF 30.0 TO 30.9 IN ADULT: ICD-10-CM

## 2022-04-29 DIAGNOSIS — F41.9 ANXIETY: ICD-10-CM

## 2022-04-29 DIAGNOSIS — Z12.11 COLON CANCER SCREENING: ICD-10-CM

## 2022-04-29 PROCEDURE — 3078F DIAST BP <80 MM HG: CPT | Mod: CPTII,S$GLB,, | Performed by: FAMILY MEDICINE

## 2022-04-29 PROCEDURE — 99999 PR PBB SHADOW E&M-EST. PATIENT-LVL IV: CPT | Mod: PBBFAC,,, | Performed by: FAMILY MEDICINE

## 2022-04-29 PROCEDURE — 1159F PR MEDICATION LIST DOCUMENTED IN MEDICAL RECORD: ICD-10-PCS | Mod: CPTII,S$GLB,, | Performed by: FAMILY MEDICINE

## 2022-04-29 PROCEDURE — 3008F PR BODY MASS INDEX (BMI) DOCUMENTED: ICD-10-PCS | Mod: CPTII,S$GLB,, | Performed by: FAMILY MEDICINE

## 2022-04-29 PROCEDURE — 1159F MED LIST DOCD IN RCRD: CPT | Mod: CPTII,S$GLB,, | Performed by: FAMILY MEDICINE

## 2022-04-29 PROCEDURE — 3074F SYST BP LT 130 MM HG: CPT | Mod: CPTII,S$GLB,, | Performed by: FAMILY MEDICINE

## 2022-04-29 PROCEDURE — 3044F PR MOST RECENT HEMOGLOBIN A1C LEVEL <7.0%: ICD-10-PCS | Mod: CPTII,S$GLB,, | Performed by: FAMILY MEDICINE

## 2022-04-29 PROCEDURE — 3078F PR MOST RECENT DIASTOLIC BLOOD PRESSURE < 80 MM HG: ICD-10-PCS | Mod: CPTII,S$GLB,, | Performed by: FAMILY MEDICINE

## 2022-04-29 PROCEDURE — 99999 PR PBB SHADOW E&M-EST. PATIENT-LVL IV: ICD-10-PCS | Mod: PBBFAC,,, | Performed by: FAMILY MEDICINE

## 2022-04-29 PROCEDURE — 1160F RVW MEDS BY RX/DR IN RCRD: CPT | Mod: CPTII,S$GLB,, | Performed by: FAMILY MEDICINE

## 2022-04-29 PROCEDURE — 3044F HG A1C LEVEL LT 7.0%: CPT | Mod: CPTII,S$GLB,, | Performed by: FAMILY MEDICINE

## 2022-04-29 PROCEDURE — 93970 US LOWER EXTREMITY VEINS BILATERAL INSUFFICIENCY: ICD-10-PCS | Mod: 26,,, | Performed by: RADIOLOGY

## 2022-04-29 PROCEDURE — 99396 PR PREVENTIVE VISIT,EST,40-64: ICD-10-PCS | Mod: S$GLB,,, | Performed by: FAMILY MEDICINE

## 2022-04-29 PROCEDURE — 3008F BODY MASS INDEX DOCD: CPT | Mod: CPTII,S$GLB,, | Performed by: FAMILY MEDICINE

## 2022-04-29 PROCEDURE — 99396 PREV VISIT EST AGE 40-64: CPT | Mod: S$GLB,,, | Performed by: FAMILY MEDICINE

## 2022-04-29 PROCEDURE — 93970 EXTREMITY STUDY: CPT | Mod: TC

## 2022-04-29 PROCEDURE — 1160F PR REVIEW ALL MEDS BY PRESCRIBER/CLIN PHARMACIST DOCUMENTED: ICD-10-PCS | Mod: CPTII,S$GLB,, | Performed by: FAMILY MEDICINE

## 2022-04-29 PROCEDURE — 93970 EXTREMITY STUDY: CPT | Mod: 26,,, | Performed by: RADIOLOGY

## 2022-04-29 PROCEDURE — 3074F PR MOST RECENT SYSTOLIC BLOOD PRESSURE < 130 MM HG: ICD-10-PCS | Mod: CPTII,S$GLB,, | Performed by: FAMILY MEDICINE

## 2022-04-29 RX ORDER — BUPROPION HYDROCHLORIDE 300 MG/1
300 TABLET ORAL DAILY
Qty: 90 TABLET | Refills: 4 | Status: SHIPPED | OUTPATIENT
Start: 2022-04-29 | End: 2023-08-01

## 2022-04-29 NOTE — PROGRESS NOTES
Office Visit    Patient Name: Stef Pool    : 1955  MRN: [id number]    Subjective:  Stef is a 66 y.o. male who presents today for:    Annual Exam    67 yo male here for annual exam (last seen by me 10/29/21) with monitoring of chronic conditions including obesity, prediabetes, BARAK on CPAP, hyperlipidemia (with ongoing triglyceride elevation and low HDL), history of tobacco abuse(quit 2019), anxiety and depression (Zoloft 100 and Xanax 0.5 prn).    Seen by Dr Morris 21 re: venous insufficiency/varicose vains:   Compression stockings 20-30 mmHg  Exercise as tolerated   Venous reflux US to assess extent of reflux disease  Treatment Plan will likely include combination chemical ablation, sclerotherapy, and phlebectomy.       He retired from [employer] 2021.    His daughter had a baby boy 2021-- initially his daughter and the baby your living with them. They have since moved out but he and his wife are now watching the baby full-time wall his daughter is working.     Labs drawn prior to office visit 22 include normal TSH/CBC/EGFR/LFTS. A1c 5.7 on Metformin 500 mg XR daily (prev 5.6). Vitamin D 45 on 50,000 IU D2 Q7days PLUS 2,000 IU D3 DAILY.   Lipid panel shows increase in triglycerides to 260 HDL 29/ LDL 54 on Lipitor 40.      He has been feeling over all well.   General mood outlook is good-- helped with daily zoloft. Takes prn xanax for anxiety.   Bladder emptying ability decreasing-- weakening stream and some increased nocturia.      Diet: FAIR-- drinking fair amount of water back to one pepsi daily and limiting sugar in coffee, but he could do better meal prepping, though better about eating at home.   Exercise: walking about 2 days per week (used to do 3 miles per day) and needs to do some calisthenics  Sleep: good-- 6-7 hours nightly-- now using CPAP EVERY NIGHT and sleeping well over all though he gets up to go to the bathroom once on average nightly  Weight is STABLE in the  last 6 months-- lost 10 lbs but gained it back-- BMI 30.     Smoking Cessation Update: was smoke free since 7/14/19- BUT HE STARTED SMOKING AGAIN IN October 2021 and is now smoking 1 PPD     Screening tests: colonoscopy 7/12/19-- repeat 3 years, PSA 2.5 3/1/21 (stable, 2.6 last yr)-- repeat ordered, hep C neg 11/20/2015, HIV (-) 8/7/20     Immunizations: TDaP 5/2/2013, FLU shot today 10/29/21, Zostavax 1/10/2018 & SHINGRIX Completed 10/16/20, Pneumovax 23 1/31/2012 (when he was smoking), COVID-19 VACCINE COMPLETED W/ MODERNA BOOSTER 12/22/21     Eye/Dental: eye [facility] 1/26/2018- RTC one year & Ophtho dr Peters-- OVERDUE AND HE WILL, dental UTD             PAST MEDICAL HISTORY, SURGICAL/SOCIAL/FAMILY HISTORY REVIEWED AS PER CHART, WITH PERTINENT FINDINGS INCLUDED IN HISTORY SECTION OF NOTE.     Current Medications    Medication List with Changes/Refills   New Medications    BUPROPION (WELLBUTRIN XL) 300 MG 24 HR TABLET    Take 1 tablet (300 mg total) by mouth once daily.   Current Medications    ALPRAZOLAM (XANAX) 0.5 MG TABLET    TAKE 1 TABLET BY MOUTH NIGHTLY    ASPIRIN 81 MG CHEW    Take 1 tablet (81 mg total) by mouth once daily.    ATORVASTATIN (LIPITOR) 40 MG TABLET    Take 1 tablet (40 mg total) by mouth once daily.    CHOLECALCIFEROL, VITAMIN D3, (VITAMIN D3) 50 MCG (2,000 UNIT) CAP    Take 1 capsule (2,000 Units total) by mouth once daily.    ERGOCALCIFEROL (VITAMIN D2) 50,000 UNIT CAP    Take 1 capsule (50,000 Units total) by mouth every 7 days.    FLUTICASONE PROPIONATE (FLONASE) 50 MCG/ACTUATION NASAL SPRAY    2 sprays (100 mcg total) by Each Nostril route once daily.    METFORMIN (GLUCOPHAGE-XR) 500 MG ER 24HR TABLET    Take 1 tablet (500 mg total) by mouth 2 (two) times daily with meals.    SERTRALINE (ZOLOFT) 100 MG TABLET    Take 1 tablet (100 mg total) by mouth once daily.    SILDENAFIL (VIAGRA) 100 MG TABLET    Take 1 tablet (100 mg total) by mouth daily as needed for Erectile Dysfunction.     "TAMSULOSIN (FLOMAX) 0.4 MG CAP    TAKE TWO CAPSULES BY MOUTH DAILY IN THE EVENING.   Discontinued Medications    BUPROPION (WELLBUTRIN XL) 150 MG TB24 TABLET    Take 1 tablet (150 mg total) by mouth daily with breakfast.       Allergies   Review of patient's allergies indicates:  No Known Allergies      Review of Systems (Pertinent positives)  Review of Systems   Constitutional: Negative for unexpected weight change (expected mild weight gain).   HENT: Negative for dental problem.    Respiratory: Negative for shortness of breath.    Cardiovascular: Positive for leg swelling (with varicose veins stable ). Negative for chest pain.   Gastrointestinal: Negative for constipation and diarrhea.   Genitourinary: Positive for frequency (stable ).   Neurological: Negative for dizziness and light-headedness.   Psychiatric/Behavioral: Negative for dysphoric mood (stable) and sleep disturbance. The patient is not nervous/anxious (stable).        /65 (BP Location: Right arm, Patient Position: Sitting)   Pulse 62   Temp 98 °F (36.7 °C)   Ht 5' 11" (1.803 m)   Wt 98.4 kg (216 lb 14.9 oz)   SpO2 97%   BMI 30.26 kg/m²     Physical Exam  Vitals reviewed.   Constitutional:       General: He is not in acute distress.  HENT:      Head: Normocephalic and atraumatic.      Right Ear: External ear normal.      Left Ear: External ear normal.      Nose: Nose normal.      Mouth/Throat:      Pharynx: No oropharyngeal exudate.   Eyes:      General: No scleral icterus.     Conjunctiva/sclera: Conjunctivae normal.   Cardiovascular:      Rate and Rhythm: Normal rate and regular rhythm.      Heart sounds: Normal heart sounds.      Comments: Distended veins of lower extremities  Pulmonary:      Effort: Pulmonary effort is normal.      Breath sounds: Normal breath sounds.   Abdominal:      General: Bowel sounds are normal. There is no distension.      Palpations: Abdomen is soft.      Tenderness: There is no abdominal tenderness. "   Musculoskeletal:         General: Normal range of motion.      Right lower leg: No edema.      Left lower leg: No edema.   Lymphadenopathy:      Cervical: No cervical adenopathy.   Skin:     General: Skin is warm and dry.      Findings: No rash.   Neurological:      General: No focal deficit present.      Mental Status: He is alert and oriented to person, place, and time.   Psychiatric:         Mood and Affect: Mood normal.         Behavior: Behavior normal.           Assessment/Plan:  Stef Pool is a 66 y.o. male who presents today for :        ICD-10-CM ICD-9-CM    1. Routine general medical examination at a health care facility  Z00.00 V70.0    2. BARAK on CPAP  G47.33 327.23     Z99.89 V46.8    3. Class 1 obesity due to excess calories with serious comorbidity and body mass index (BMI) of 30.0 to 30.9 in adult  E66.09 278.00     Z68.30 V85.30    4. Combined hyperlipidemia  E78.2 272.2 Hemoglobin A1C      Comprehensive Metabolic Panel      Lipid Panel   5. Prediabetes  R73.03 790.29 Hemoglobin A1C      Comprehensive Metabolic Panel      Lipid Panel   6. Venous insufficiency of lower extremity, unspecified laterality  I87.2 459.81    7. Varicose veins of leg with pain, bilateral  I83.813 454.8    8. Long-term use of aspirin therapy  Z79.82 V58.66    9. Tobacco abuse counseling  Z71.6 V65.42 buPROPion (WELLBUTRIN XL) 300 MG 24 hr tablet     305.1    10. History of colon polyps  Z86.010 V12.72    11. Vitamin D deficiency  E55.9 268.9    12. Nuclear sclerosis, bilateral  H25.13 366.16    13. Anxiety  F41.9 300.00    14. Major depressive disorder, recurrent, moderate  F33.1 296.32 buPROPion (WELLBUTRIN XL) 300 MG 24 hr tablet   15. Medication management  Z79.899 V58.69    16. Colon cancer screening  Z12.11 V76.51 Case Request Endoscopy: COLONOSCOPY   17. Screening for malignant neoplasm of prostate  Z12.5 V76.44 PSA, Screening     ADVISED ON DIET/EXERCISE/SLEEP, ROUTINE EYE/DENTAL EXAMS, AND THE IMPORTANCE OF  KEEPING UP WITH APPROPRIATE SCREENING TESTS BASED ON AGE AND RISK FACTORS.  COVID-19 VACCINE COMPLETED W/ MODERNA BOOSTER 12/22/21-- considering 4th shot (2nd booster), COLONOSCOPY & PSA ORDERED &  HEALTH MAINTENANCE OTHERWISE UP-TO-DATE.     OBESITY:  Counseled on diet/exercise habits and the importance of continued focus on weight loss.    PREDIABETES: A1c controlled on metformin  mg b.i.d and with dietary improvements. Continue current regimen and recheck 6 months.      HLD:  LDL at goal of less than 100 on Lipitor 40, but triglycerides >200. Will try to get back on track with diet-- cutting out soft drinks and exercise. Recheck in 6 months     DEPRESSION/ANXIETY: Anxiety stable on Zoloft 100 mg daily with 1 low-dose Xanax daily.  Wellbutrin has been stated his mood but he is smoking again so will try increasing from 150 XL is 300 XL daily.     SMOKER:  Unfortunately after quitting smoking for about 18 months he resumed smoking about 5 months ago in October 2020.  Considering recommending to complete cessation.  Wellbutrin increased to 300 mg XL daily.     VITAMIN-D DEFICIENCY:  Level now normal on combination of weekly 78863 IU D2 with daily 2000 IU D3.     BILATERAL DISTENDED PAINFUL VARICOSE VEINS: following with Vascular Dr Morris placed, wearing compression stockings, has updated imaging today and will follow up.  Considering surgical intervention     SOFT TISSUE MASS OF LEFT SCAPULAR REGION: Sebaceous cyst excised by general surgery      BPH:  symptoms are adequately managed on Flomax 0.8 mg nightly, consider urology referral if worsening.  PSA today for screening    Patient Instructions   An order for a colonoscopy has been placed. If you have not been contacted within 5-7 days to schedule this procedure, please call GI scheduling at [phone]    Colonoscopy Due on or after 7/12/22        Follow up in about 6 months (around 10/29/2022) for to follow up on lab results, return as needed for new  concerns.

## 2022-04-29 NOTE — PATIENT INSTRUCTIONS
An order for a colonoscopy has been placed. If you have not been contacted within 5-7 days to schedule this procedure, please call GI scheduling at [phone]    Colonoscopy Due on or after 7/12/22

## 2022-05-12 ENCOUNTER — PATIENT MESSAGE (OUTPATIENT)
Dept: SMOKING CESSATION | Facility: CLINIC | Age: 67
End: 2022-05-12
Payer: COMMERCIAL

## 2022-07-18 ENCOUNTER — OFFICE VISIT (OUTPATIENT)
Dept: DERMATOLOGY | Facility: CLINIC | Age: 67
End: 2022-07-18
Payer: COMMERCIAL

## 2022-07-18 DIAGNOSIS — L72.0 EIC (EPIDERMAL INCLUSION CYST): ICD-10-CM

## 2022-07-18 DIAGNOSIS — L57.8 FAVRE AND RACOUCHOT SYNDROME: ICD-10-CM

## 2022-07-18 DIAGNOSIS — D22.9 BENIGN NEVUS: ICD-10-CM

## 2022-07-18 DIAGNOSIS — Z12.83 SCREENING EXAM FOR SKIN CANCER: ICD-10-CM

## 2022-07-18 DIAGNOSIS — L70.0 FAVRE AND RACOUCHOT SYNDROME: ICD-10-CM

## 2022-07-18 DIAGNOSIS — L81.4 LENTIGO: Primary | ICD-10-CM

## 2022-07-18 DIAGNOSIS — L82.1 SK (SEBORRHEIC KERATOSIS): ICD-10-CM

## 2022-07-18 PROCEDURE — 99999 PR PBB SHADOW E&M-EST. PATIENT-LVL III: ICD-10-PCS | Mod: PBBFAC,,, | Performed by: DERMATOLOGY

## 2022-07-18 PROCEDURE — 99999 PR PBB SHADOW E&M-EST. PATIENT-LVL III: CPT | Mod: PBBFAC,,, | Performed by: DERMATOLOGY

## 2022-07-18 PROCEDURE — 1159F MED LIST DOCD IN RCRD: CPT | Mod: CPTII,S$GLB,, | Performed by: DERMATOLOGY

## 2022-07-18 PROCEDURE — 3044F HG A1C LEVEL LT 7.0%: CPT | Mod: CPTII,S$GLB,, | Performed by: DERMATOLOGY

## 2022-07-18 PROCEDURE — 99203 PR OFFICE/OUTPT VISIT, NEW, LEVL III, 30-44 MIN: ICD-10-PCS | Mod: S$GLB,,, | Performed by: DERMATOLOGY

## 2022-07-18 PROCEDURE — 1160F RVW MEDS BY RX/DR IN RCRD: CPT | Mod: CPTII,S$GLB,, | Performed by: DERMATOLOGY

## 2022-07-18 PROCEDURE — 1159F PR MEDICATION LIST DOCUMENTED IN MEDICAL RECORD: ICD-10-PCS | Mod: CPTII,S$GLB,, | Performed by: DERMATOLOGY

## 2022-07-18 PROCEDURE — 3044F PR MOST RECENT HEMOGLOBIN A1C LEVEL <7.0%: ICD-10-PCS | Mod: CPTII,S$GLB,, | Performed by: DERMATOLOGY

## 2022-07-18 PROCEDURE — 99203 OFFICE O/P NEW LOW 30 MIN: CPT | Mod: S$GLB,,, | Performed by: DERMATOLOGY

## 2022-07-18 PROCEDURE — 1160F PR REVIEW ALL MEDS BY PRESCRIBER/CLIN PHARMACIST DOCUMENTED: ICD-10-PCS | Mod: CPTII,S$GLB,, | Performed by: DERMATOLOGY

## 2022-07-18 NOTE — PROGRESS NOTES
Subjective:       Patient ID:  Stef Pool is a 66 y.o. male who presents for   Chief Complaint   Patient presents with    Skin Check     History of Present Illness: The patient presents for follow up of skin check.    The patient was last seen on: 1/25/2018 for cryo to benign licehnoid keratosis (right volar forearm) and pt says this has resolved.     No hx skin cancer.     Patient with new complaint of lesion(s)  Location: face  Duration: 2 years  Symptoms: none  Relieving factors/Previous treatments: none          Review of Systems   Skin: Positive for wears hat (rarely). Negative for daily sunscreen use, activity-related sunscreen use and recent sunburn.   Hematologic/Lymphatic: Bruises/bleeds easily (baby aspirin).        Objective:    Physical Exam   Constitutional: He appears well-developed and well-nourished. No distress.   Neurological: He is alert and oriented to person, place, and time. He is not disoriented.   Psychiatric: He has a normal mood and affect.   Skin:   Areas Examined (abnormalities noted in diagram):   Head / Face Inspection Performed  Neck Inspection Performed  Chest / Axilla Inspection Performed  Back Inspection Performed  RUE Inspected  LUE Inspection Performed                   Diagram Legend     Erythematous scaling macule/papule c/w actinic keratosis       Vascular papule c/w angioma      Pigmented verrucoid papule/plaque c/w seborrheic keratosis      Yellow umbilicated papule c/w sebaceous hyperplasia      Irregularly shaped tan macule c/w lentigo     1-2 mm smooth white papules consistent with Milia      Movable subcutaneous cyst with punctum c/w epidermal inclusion cyst      Subcutaneous movable cyst c/w pilar cyst      Firm pink to brown papule c/w dermatofibroma      Pedunculated fleshy papule(s) c/w skin tag(s)      Evenly pigmented macule c/w junctional nevus     Mildly variegated pigmented, slightly irregular-bordered macule c/w mildly atypical nevus      Flesh colored  to evenly pigmented papule c/w intradermal nevus       Pink pearly papule/plaque c/w basal cell carcinoma      Erythematous hyperkeratotic cursted plaque c/w SCC      Surgical scar with no sign of skin cancer recurrence      Open and closed comedones      Inflammatory papules and pustules      Verrucoid papule consistent consistent with wart     Erythematous eczematous patches and plaques     Dystrophic onycholytic nail with subungual debris c/w onychomycosis     Umbilicated papule    Erythematous-base heme-crusted tan verrucoid plaque consistent with inflamed seborrheic keratosis     Erythematous Silvery Scaling Plaque c/w Psoriasis     See annotation      Assessment / Plan:        Lentigo  This is a benign hyperpigmented sun induced lesion. Recommend daily sun protection/avoidance and use of at least SPF 30, broad spectrum sunscreen (OTC drug) will reduce the number of new lesions. Treatment of these benign lesions are considered cosmetic.        SK (seborrheic keratosis)  These are benign inherited growths without a malignant potential. Reassurance given to patient. No treatment is necessary.       Benign nevus   - minor problem and chronic.   Reassurance given to patient. No treatment necessary.       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       Screening exam for skin cancer  Upper body skin examination performed today including at least 6 points as noted in physical examination. No lesions suspicious for malignancy noted.    Recommend daily sun protection/avoidance and use of at least SPF 30, broad spectrum sunscreen (OTC drug).     Discussed sun protective clothing    EIC (epidermal inclusion cyst)   - minor problem and chronic.   Reassurance given to patient. No treatment necessary.                No follow-ups on file.

## 2022-07-27 ENCOUNTER — CLINICAL SUPPORT (OUTPATIENT)
Dept: URGENT CARE | Facility: CLINIC | Age: 67
End: 2022-07-27
Payer: COMMERCIAL

## 2022-07-27 DIAGNOSIS — Z20.822 ENCOUNTER FOR LABORATORY TESTING FOR COVID-19 VIRUS: Primary | ICD-10-CM

## 2022-07-27 LAB
CTP QC/QA: YES
SARS-COV-2 RDRP RESP QL NAA+PROBE: NEGATIVE

## 2022-07-27 PROCEDURE — U0002 COVID-19 LAB TEST NON-CDC: HCPCS | Mod: QW,S$GLB,, | Performed by: PHYSICIAN ASSISTANT

## 2022-07-27 PROCEDURE — U0002: ICD-10-PCS | Mod: QW,S$GLB,, | Performed by: PHYSICIAN ASSISTANT

## 2022-07-27 NOTE — PATIENT INSTRUCTIONS
"You have tested negative for COVID-19 today. If you did not have a close exposure (as defined below) you can return to your normal daily activities to include social distancing, wearing a mask and frequent handwashing.     A "close exposure" is defined as anyone who has had an exposure (masked or unmasked) to a known COVID -19 positive person within 6 ft for longer than 15 minutes. If your exposure meets this definition you are required by CDC guidelines to quarantine for 5 days from time of exposure. CDC now states that a test can be performed for an asymptomatic patient (someone who does not have any symptoms) after a close exposure, and that a test should be done if you develop symptoms after a close exposure as described above.     If you developed symptoms since the exposure, and your test was negative today, you still have to quarantine for 5 days from the date of the exposure.     The 5-day quarantine begins from the day you were exposed, not the day of your test. For example, if your exposure was on a Monday, and you waited until Friday of the same week to get tested and it was negative, your 5-day quarantine begins from that Monday, not the Friday you tested negative.     So, if you meet the definition of a close exposure, it will not matter whether you are experiencing symptoms-quarantine for 5 days after close exposure is required by CDC guidelines regardless of test status- a negative test does not shorten the quarantine period.   "

## 2022-09-24 DIAGNOSIS — R39.11 BENIGN PROSTATIC HYPERPLASIA WITH URINARY HESITANCY: ICD-10-CM

## 2022-09-24 DIAGNOSIS — E78.2 COMBINED HYPERLIPIDEMIA: ICD-10-CM

## 2022-09-24 DIAGNOSIS — F33.1 MAJOR DEPRESSIVE DISORDER, RECURRENT, MODERATE: ICD-10-CM

## 2022-09-24 DIAGNOSIS — R73.03 PREDIABETES: ICD-10-CM

## 2022-09-24 DIAGNOSIS — N40.1 BENIGN PROSTATIC HYPERPLASIA WITH URINARY HESITANCY: ICD-10-CM

## 2022-09-24 RX ORDER — ATORVASTATIN CALCIUM 40 MG/1
TABLET, FILM COATED ORAL
Qty: 90 TABLET | Refills: 1 | Status: SHIPPED | OUTPATIENT
Start: 2022-09-24 | End: 2023-01-17

## 2022-09-24 RX ORDER — SERTRALINE HYDROCHLORIDE 100 MG/1
TABLET, FILM COATED ORAL
Qty: 90 TABLET | Refills: 2 | Status: SHIPPED | OUTPATIENT
Start: 2022-09-24 | End: 2023-07-04

## 2022-09-24 RX ORDER — METFORMIN HYDROCHLORIDE 500 MG/1
TABLET, EXTENDED RELEASE ORAL
Qty: 180 TABLET | Refills: 0 | Status: SHIPPED | OUTPATIENT
Start: 2022-09-24 | End: 2022-11-01

## 2022-09-24 RX ORDER — TAMSULOSIN HYDROCHLORIDE 0.4 MG/1
CAPSULE ORAL
Qty: 180 CAPSULE | Refills: 2 | Status: SHIPPED | OUTPATIENT
Start: 2022-09-24 | End: 2023-07-04

## 2022-09-24 NOTE — TELEPHONE ENCOUNTER
Refill Decision Note   Stef Pool  is requesting a refill authorization.  Brief Assessment and Rationale for Refill:  Approve     Medication Therapy Plan:       Medication Reconciliation Completed: No   Comments:     No Care Gaps recommended.     Note composed:3:06 PM 09/24/2022

## 2022-09-24 NOTE — TELEPHONE ENCOUNTER
No new care gaps identified.  [facility] Embedded Care Gaps. Reference number: [id number]. 9/24/2022   7:53:04 AM MARVT

## 2022-10-24 ENCOUNTER — LAB VISIT (OUTPATIENT)
Dept: LAB | Facility: HOSPITAL | Age: 67
End: 2022-10-24
Attending: FAMILY MEDICINE
Payer: COMMERCIAL

## 2022-10-24 DIAGNOSIS — R73.03 PREDIABETES: ICD-10-CM

## 2022-10-24 DIAGNOSIS — E78.2 COMBINED HYPERLIPIDEMIA: ICD-10-CM

## 2022-10-24 LAB
ALBUMIN SERPL BCP-MCNC: 3.8 G/DL (ref 3.5–5.2)
ALP SERPL-CCNC: 107 U/L (ref 55–135)
ALT SERPL W/O P-5'-P-CCNC: 24 U/L (ref 10–44)
ANION GAP SERPL CALC-SCNC: 9 MMOL/L (ref 8–16)
AST SERPL-CCNC: 21 U/L (ref 10–40)
BILIRUB SERPL-MCNC: 0.4 MG/DL (ref 0.1–1)
BUN SERPL-MCNC: 14 MG/DL (ref 8–23)
CALCIUM SERPL-MCNC: 9 MG/DL (ref 8.7–10.5)
CHLORIDE SERPL-SCNC: 109 MMOL/L (ref 95–110)
CHOLEST SERPL-MCNC: 146 MG/DL (ref 120–199)
CHOLEST/HDLC SERPL: 4.7 {RATIO} (ref 2–5)
CO2 SERPL-SCNC: 24 MMOL/L (ref 23–29)
CREAT SERPL-MCNC: 0.9 MG/DL (ref 0.5–1.4)
EST. GFR  (NO RACE VARIABLE): >60 ML/MIN/1.73 M^2
ESTIMATED AVG GLUCOSE: 114 MG/DL (ref 68–131)
GLUCOSE SERPL-MCNC: 104 MG/DL (ref 70–110)
HBA1C MFR BLD: 5.6 % (ref 4–5.6)
HDLC SERPL-MCNC: 31 MG/DL (ref 40–75)
HDLC SERPL: 21.2 % (ref 20–50)
LDLC SERPL CALC-MCNC: 76.8 MG/DL (ref 63–159)
NONHDLC SERPL-MCNC: 115 MG/DL
POTASSIUM SERPL-SCNC: 4.3 MMOL/L (ref 3.5–5.1)
PROT SERPL-MCNC: 6.6 G/DL (ref 6–8.4)
SODIUM SERPL-SCNC: 142 MMOL/L (ref 136–145)
TRIGL SERPL-MCNC: 191 MG/DL (ref 30–150)

## 2022-10-24 PROCEDURE — 36415 COLL VENOUS BLD VENIPUNCTURE: CPT | Performed by: FAMILY MEDICINE

## 2022-10-24 PROCEDURE — 80053 COMPREHEN METABOLIC PANEL: CPT | Performed by: FAMILY MEDICINE

## 2022-10-24 PROCEDURE — 80061 LIPID PANEL: CPT | Performed by: FAMILY MEDICINE

## 2022-10-24 PROCEDURE — 83036 HEMOGLOBIN GLYCOSYLATED A1C: CPT | Performed by: FAMILY MEDICINE

## 2022-10-26 ENCOUNTER — OFFICE VISIT (OUTPATIENT)
Dept: FAMILY MEDICINE | Facility: CLINIC | Age: 67
End: 2022-10-26
Payer: COMMERCIAL

## 2022-10-26 VITALS
BODY MASS INDEX: 29.5 KG/M2 | SYSTOLIC BLOOD PRESSURE: 114 MMHG | TEMPERATURE: 98 F | WEIGHT: 210.75 LBS | DIASTOLIC BLOOD PRESSURE: 64 MMHG | OXYGEN SATURATION: 97 % | HEART RATE: 69 BPM | HEIGHT: 71 IN

## 2022-10-26 DIAGNOSIS — E55.9 VITAMIN D DEFICIENCY: ICD-10-CM

## 2022-10-26 DIAGNOSIS — Z72.0 TOBACCO ABUSE: ICD-10-CM

## 2022-10-26 DIAGNOSIS — F41.9 ANXIETY: ICD-10-CM

## 2022-10-26 DIAGNOSIS — R73.03 PREDIABETES: ICD-10-CM

## 2022-10-26 DIAGNOSIS — I83.813 VARICOSE VEINS OF LEG WITH PAIN, BILATERAL: ICD-10-CM

## 2022-10-26 DIAGNOSIS — Z79.899 MEDICATION MANAGEMENT: ICD-10-CM

## 2022-10-26 DIAGNOSIS — F33.1 MAJOR DEPRESSIVE DISORDER, RECURRENT, MODERATE: ICD-10-CM

## 2022-10-26 DIAGNOSIS — I87.2 VENOUS INSUFFICIENCY OF LOWER EXTREMITY, UNSPECIFIED LATERALITY: ICD-10-CM

## 2022-10-26 DIAGNOSIS — Z01.00 ROUTINE EYE EXAM: ICD-10-CM

## 2022-10-26 DIAGNOSIS — Z79.82 LONG-TERM USE OF ASPIRIN THERAPY: ICD-10-CM

## 2022-10-26 DIAGNOSIS — Z12.11 COLON CANCER SCREENING: ICD-10-CM

## 2022-10-26 DIAGNOSIS — Z12.5 SCREENING FOR MALIGNANT NEOPLASM OF PROSTATE: ICD-10-CM

## 2022-10-26 DIAGNOSIS — Z86.010 HISTORY OF COLON POLYPS: ICD-10-CM

## 2022-10-26 DIAGNOSIS — E78.2 COMBINED HYPERLIPIDEMIA: Primary | ICD-10-CM

## 2022-10-26 DIAGNOSIS — G47.33 OSA ON CPAP: ICD-10-CM

## 2022-10-26 DIAGNOSIS — Z23 NEEDS FLU SHOT: ICD-10-CM

## 2022-10-26 DIAGNOSIS — E66.3 OVERWEIGHT (BMI 25.0-29.9): ICD-10-CM

## 2022-10-26 PROCEDURE — 1159F MED LIST DOCD IN RCRD: CPT | Mod: CPTII,S$GLB,, | Performed by: FAMILY MEDICINE

## 2022-10-26 PROCEDURE — 99214 OFFICE O/P EST MOD 30 MIN: CPT | Mod: 25,S$GLB,, | Performed by: FAMILY MEDICINE

## 2022-10-26 PROCEDURE — 90686 FLU VACCINE (QUAD) GREATER THAN OR EQUAL TO 3YO PRESERVATIVE FREE IM: ICD-10-PCS | Mod: S$GLB,,, | Performed by: FAMILY MEDICINE

## 2022-10-26 PROCEDURE — 3044F HG A1C LEVEL LT 7.0%: CPT | Mod: CPTII,S$GLB,, | Performed by: FAMILY MEDICINE

## 2022-10-26 PROCEDURE — 3074F SYST BP LT 130 MM HG: CPT | Mod: CPTII,S$GLB,, | Performed by: FAMILY MEDICINE

## 2022-10-26 PROCEDURE — 90686 IIV4 VACC NO PRSV 0.5 ML IM: CPT | Mod: S$GLB,,, | Performed by: FAMILY MEDICINE

## 2022-10-26 PROCEDURE — 1159F PR MEDICATION LIST DOCUMENTED IN MEDICAL RECORD: ICD-10-PCS | Mod: CPTII,S$GLB,, | Performed by: FAMILY MEDICINE

## 2022-10-26 PROCEDURE — 3078F PR MOST RECENT DIASTOLIC BLOOD PRESSURE < 80 MM HG: ICD-10-PCS | Mod: CPTII,S$GLB,, | Performed by: FAMILY MEDICINE

## 2022-10-26 PROCEDURE — 3044F PR MOST RECENT HEMOGLOBIN A1C LEVEL <7.0%: ICD-10-PCS | Mod: CPTII,S$GLB,, | Performed by: FAMILY MEDICINE

## 2022-10-26 PROCEDURE — 3078F DIAST BP <80 MM HG: CPT | Mod: CPTII,S$GLB,, | Performed by: FAMILY MEDICINE

## 2022-10-26 PROCEDURE — 90471 IMMUNIZATION ADMIN: CPT | Mod: S$GLB,,, | Performed by: FAMILY MEDICINE

## 2022-10-26 PROCEDURE — 99999 PR PBB SHADOW E&M-EST. PATIENT-LVL V: CPT | Mod: PBBFAC,,, | Performed by: FAMILY MEDICINE

## 2022-10-26 PROCEDURE — 99214 PR OFFICE/OUTPT VISIT, EST, LEVL IV, 30-39 MIN: ICD-10-PCS | Mod: 25,S$GLB,, | Performed by: FAMILY MEDICINE

## 2022-10-26 PROCEDURE — 90471 FLU VACCINE (QUAD) GREATER THAN OR EQUAL TO 3YO PRESERVATIVE FREE IM: ICD-10-PCS | Mod: S$GLB,,, | Performed by: FAMILY MEDICINE

## 2022-10-26 PROCEDURE — 3074F PR MOST RECENT SYSTOLIC BLOOD PRESSURE < 130 MM HG: ICD-10-PCS | Mod: CPTII,S$GLB,, | Performed by: FAMILY MEDICINE

## 2022-10-26 PROCEDURE — 1160F PR REVIEW ALL MEDS BY PRESCRIBER/CLIN PHARMACIST DOCUMENTED: ICD-10-PCS | Mod: CPTII,S$GLB,, | Performed by: FAMILY MEDICINE

## 2022-10-26 PROCEDURE — 99999 PR PBB SHADOW E&M-EST. PATIENT-LVL V: ICD-10-PCS | Mod: PBBFAC,,, | Performed by: FAMILY MEDICINE

## 2022-10-26 PROCEDURE — 1160F RVW MEDS BY RX/DR IN RCRD: CPT | Mod: CPTII,S$GLB,, | Performed by: FAMILY MEDICINE

## 2022-10-26 RX ORDER — IBUPROFEN 200 MG
1 TABLET ORAL DAILY
Qty: 28 PATCH | Refills: 3 | Status: SHIPPED | OUTPATIENT
Start: 2022-10-26 | End: 2023-01-04

## 2022-10-26 NOTE — PATIENT INSTRUCTIONS
An order for a colonoscopy has been placed. If you have not been contacted within 5-7 days to schedule this procedure, please call GI scheduling at [phone]    Colonoscopy Due as of 7/12/22    ADVISE COVID-19 OMICRON BOOSTER

## 2022-10-26 NOTE — PROGRESS NOTES
Office Visit    Patient Name: Stef Pool    : 1955  MRN: [id number]    Subjective:  Stef is a 66 y.o. male who presents today for:    Follow-up    65 yo male here for 6 mo f/u (last seen by me 22 for annual) with monitoring of chronic conditions including obesity, prediabetes, BARAK on CPAP, hyperlipidemia (with ongoing triglyceride elevation and low HDL), history of tobacco abuse(quit 2019), anxiety and depression (Zoloft 100 and Xanax 0.5 prn).     Seen by Dr Morris 21 re: venous insufficiency/varicose vains:   Compression stockings 20-30 mmHg  Exercise as tolerated   Venous reflux US to assess extent of reflux disease  Treatment Plan will likely include combination chemical ablation, sclerotherapy, and phlebectomy.      US BLE  22:   Impression:   1. Hemodynamically significant venous reflux within the left GSV and popliteal veins as well as involving bilateral accessory calf veins.   2. There is no evidence of bilateral lower extremity deep venous thrombosis.     He retired from [employer] 2021.    His daughter had a baby boy 2021-- initially his daughter and the baby your living with them. They have since moved out but he and his wife are now watching the baby full-time wall his daughter is working.  He enjoyed an [employer] 2022.      10/24/2022 show improvement in triglycerides from 260->191, LDL 76, A1c improved from 5.7->5.6, normal CMP    Prior Labs 22 TSH/CBC/EGFR/LFTS. A1c 5.7 on Metformin 500 mg XR daily (prev 5.6). Vitamin D 45 on 50,000 IU D2 Q7days PLUS 2,000 IU D3 DAILY.       He has been feeling over all well.   General mood outlook is good-- helped with daily zoloft +Wellbutrin. Takes prn xanax for anxiety.   Bladder emptying stable on Flomax.   Smoking Cessation Update: was smoke free since 21- BUT HE STARTED SMOKING AGAIN IN 2021 and is now smoking 1 PPD  May be interested in reach trying Chantix again down the line, possibly  nicotine patches.     Diet: FAIR-- drinking fair amount of water back 2 root beers and a milk daily, limiting sugar in coffee, but he could do better meal prepping to incorporate more vegetables, though better about eating at home.   Exercise: walking about 3 days per week (used to do 3 miles per day) and some bike riding   Sleep: good-- 6-7 hours nightly-- now using CPAP EVERY NIGHT and sleeping well over all though he gets up to go to the bathroom once on average nightly  Weight is DOWN ABOUT 6 LBS IN THE LAST 6 MONTHS FROM BMI 30--> 29.         Screening tests: colonoscopy 7/12/19-- repeat 3 years-ORDERED, PSA 2.7 4/29/22, hep C neg 11/20/2015, HIV (-) 8/7/20     Immunizations: TDaP 5/2/2013, FLU shot TODAY 10/26/22,  Zostavax 1/10/2018 & SHINGRIX Completed 10/16/20, Pneumovax 23 1/31/2012 (when he was smoking), COVID-19 VACCINE COMPLETED W/ MODERNA BOOSTER 12/22/21- BIvalent booster advised     Eye/Dental: eye [facility] 1/26/2018- RTC one year & Ophtho dr Peters-- OVERDUE and referral placed, dental UTD     PAST MEDICAL HISTORY, SURGICAL/SOCIAL/FAMILY HISTORY REVIEWED AS PER CHART, WITH PERTINENT FINDINGS INCLUDED IN HISTORY SECTION OF NOTE.     Current Medications    Medication List with Changes/Refills   New Medications    NICOTINE (NICODERM CQ) 21 MG/24 HR    Place 1 patch onto the skin once daily.   Current Medications    ALPRAZOLAM (XANAX) 0.5 MG TABLET    TAKE 1 TABLET BY MOUTH NIGHTLY    ASPIRIN 81 MG CHEW    Take 1 tablet (81 mg total) by mouth once daily.    ATORVASTATIN (LIPITOR) 40 MG TABLET    TAKE 1 TABLET BY MOUTH EVERY DAY    BUPROPION (WELLBUTRIN XL) 300 MG 24 HR TABLET    Take 1 tablet (300 mg total) by mouth once daily.    CHOLECALCIFEROL, VITAMIN D3, (VITAMIN D3) 50 MCG (2,000 UNIT) CAP CAPSULE    TAKE 1 CAPSULE (2,000 UNITS TOTAL) BY MOUTH ONCE DAILY.    ERGOCALCIFEROL (ERGOCALCIFEROL) 50,000 UNIT CAP    TAKE 1 CAPSULE BY MOUTH ONE TIME PER WEEK    FLUTICASONE PROPIONATE (FLONASE) 50  "MCG/ACTUATION NASAL SPRAY    INSTILL 2 SPRAYS (100 MCG TOTAL) BY EACH NOSTRIL ROUTE ONCE DAILY.    METFORMIN (GLUCOPHAGE-XR) 500 MG ER 24HR TABLET    TAKE 1 TABLET BY MOUTH TWICE A DAY WITH MEALS    SERTRALINE (ZOLOFT) 100 MG TABLET    TAKE 1 TABLET BY MOUTH EVERY DAY    SILDENAFIL (VIAGRA) 100 MG TABLET    Take 1 tablet (100 mg total) by mouth daily as needed for Erectile Dysfunction.    TAMSULOSIN (FLOMAX) 0.4 MG CAP    TAKE TWO CAPSULES BY MOUTH DAILY IN THE EVENING.       Allergies   Review of patient's allergies indicates:  No Known Allergies      Review of Systems (Pertinent positives)  Review of Systems   Constitutional:  Negative for activity change and unexpected weight change.   HENT:  Negative for hearing loss, rhinorrhea and trouble swallowing.    Eyes:  Negative for discharge and visual disturbance.   Respiratory:  Positive for shortness of breath (MILD FROM SMOKING). Negative for chest tightness and wheezing.    Cardiovascular:  Positive for leg swelling (mild, Left leg stable). Negative for chest pain and palpitations.   Gastrointestinal:  Negative for blood in stool, constipation, diarrhea and vomiting.   Endocrine: Negative for polydipsia and polyuria.   Genitourinary:  Negative for difficulty urinating, hematuria and urgency.   Musculoskeletal:  Negative for arthralgias, joint swelling and neck pain.   Neurological:  Negative for weakness and headaches.   Psychiatric/Behavioral:  Negative for confusion and dysphoric mood.      /64   Pulse 69   Temp 98.1 °F (36.7 °C)   Ht 5' 11" (1.803 m)   Wt 95.6 kg (210 lb 12.2 oz)   SpO2 97%   BMI 29.40 kg/m²     Physical Exam  Vitals reviewed.   Constitutional:       General: He is not in acute distress.     Appearance: He is well-developed.   HENT:      Head: Normocephalic and atraumatic.   Eyes:      Conjunctiva/sclera: Conjunctivae normal.   Cardiovascular:      Rate and Rhythm: Normal rate and regular rhythm.      Pulses: Normal pulses. "   Pulmonary:      Effort: Pulmonary effort is normal.      Breath sounds: Normal breath sounds.   Musculoskeletal:      Right lower leg: No edema.      Left lower leg: Edema (Trace, nonpitting and associated with distended varicose veins) present.   Skin:     General: Skin is warm and dry.   Neurological:      General: No focal deficit present.      Mental Status: He is alert and oriented to person, place, and time.   Psychiatric:         Mood and Affect: Mood normal.         Behavior: Behavior normal.         Assessment/Plan:  Stef Pool is a 66 y.o. male who presents today for :        ICD-10-CM ICD-9-CM    1. Combined hyperlipidemia  E78.2 272.2 Hemoglobin A1C      Comprehensive Metabolic Panel      Lipid Panel      CBC Auto Differential      TSH      2. Overweight (BMI 25.0-29.9)  E66.3 278.02       3. BARAK on CPAP  G47.33 327.23 Ambulatory referral/consult to Sleep Disorders    Z99.89 V46.8       4. Prediabetes  R73.03 790.29 Hemoglobin A1C      Comprehensive Metabolic Panel      Lipid Panel      CBC Auto Differential      TSH      Ambulatory referral/consult to Ophthalmology      5. Varicose veins of leg with pain, bilateral  I83.813 454.8       6. Venous insufficiency of lower extremity, unspecified laterality  I87.2 459.81       7. Vitamin D deficiency  E55.9 268.9 Vitamin D      8. Major depressive disorder, recurrent, moderate  F33.1 296.32       9. Anxiety  F41.9 300.00       10. Long-term use of aspirin therapy  Z79.82 V58.66       11. Medication management  Z79.899 V58.69 Hemoglobin A1C      Comprehensive Metabolic Panel      Lipid Panel      CBC Auto Differential      TSH      Vitamin D      12. Needs flu shot  Z23 V04.81 Influenza - Quadrivalent *Preferred* (6 months+) (PF)      13. History of colon polyps  Z86.010 V12.72 Case Request Endoscopy: COLONOSCOPY      14. Colon cancer screening  Z12.11 V76.51 Case Request Endoscopy: COLONOSCOPY      15. Screening for malignant neoplasm of prostate   Z12.5 V76.44 PSA, SCREENING      16. Tobacco abuse  Z72.0 305.1 nicotine (NICODERM CQ) 21 mg/24 hr      Ambulatory referral/consult to Smoking Cessation Program      17. Routine eye exam  Z01.00 V72.0 Ambulatory referral/consult to Ophthalmology           ADVISED ON DIET/EXERCISE/SLEEP, ROUTINE EYE/DENTAL EXAMS, AND THE IMPORTANCE OF KEEPING UP WITH APPROPRIATE SCREENING TESTS BASED ON AGE AND RISK FACTORS.  COVID-19 VACCINE COMPLETED W/ MODERNA BOOSTER 12/22/21-- BIVALENT BOOSTER ADVISED, COLONOSCOPY & YEARLY PSA ORDERED &  YEARLY FLU SHOT GIVEN     OBESITY-->OVERWEIGHT BMI:  Counseled on diet/exercise habits and the importance of continued focus on weight loss. Plans to continue walking/bike riding.  Needs to cut back on soft drinks.     PREDIABETES: A1c controlled on metformin  mg b.i.d and with lifestyle improvements. Continue current regimen and recheck 6 months.      HLD:  LDL at goal of less than 100 on Lipitor 40, with a recent improvement in triglycerides to under 200.  Continue diet/exercise/weight loss efforts and Recheck in 6 months     DEPRESSION/ANXIETY: Anxiety stable on Zoloft 100 mg daily with 1 low-dose Xanax daily.  Adding Wellbutrin 300 XL daily I felt his mood, but unfortunately not occurred his tobacco cravings.     SMOKER:  Unfortunately after quitting smoking for about 18 months he resumed smoking again around October 2020.  Repeat referral to smoking cessation, continue Wellbutrin 300 XL, prescription for nicotine replacement patches, consider repeat trial of Chantix.     VITAMIN-D DEFICIENCY:  Level now normal on combination of weekly 71314 IU D2 with daily 2000 IU D3.  Repeat in 6 months     BILATERAL DISTENDED PAINFUL VARICOSE VEINS: following with Vascular Dr Morris placed, wearing compression stockings, ultrasound showed venous reflux of the left lower extremity, doing okay with conservative management for now but can follow-up with Dr. Morris  needed.      H/O COLONIC POLYPS:   Due for repeat colonoscopy as of 07/2022, repeat order entered     BPH:  symptoms are adequately managed on Flomax 0.8 mg nightly, consider urology referral if worsening.  PSA yearly.      Patient Instructions   An order for a colonoscopy has been placed. If you have not been contacted within 5-7 days to schedule this procedure, please call GI scheduling at [phone]    Colonoscopy Due as of 7/12/22          Patient Instructions   An order for a colonoscopy has been placed. If you have not been contacted within 5-7 days to schedule this procedure, please call GI scheduling at [phone]    Colonoscopy Due as of 7/12/22    ADVISE COVID-19 OMICRON BOOSTER      Follow up in about 6 months (around 4/26/2023) for to follow up on lab results, return as needed for new concerns.

## 2022-11-02 ENCOUNTER — CLINICAL SUPPORT (OUTPATIENT)
Dept: SMOKING CESSATION | Facility: CLINIC | Age: 67
End: 2022-11-02
Payer: COMMERCIAL

## 2022-11-02 DIAGNOSIS — F17.200 NICOTINE DEPENDENCE: Primary | ICD-10-CM

## 2022-11-02 PROCEDURE — 99404 PREV MED CNSL INDIV APPRX 60: CPT | Mod: S$GLB,,,

## 2022-11-02 PROCEDURE — 99404 PR PREVENT COUNSEL,INDIV,60 MIN: ICD-10-PCS | Mod: S$GLB,,,

## 2022-11-02 RX ORDER — DM/P-EPHED/ACETAMINOPH/DOXYLAM 30-7.5/3
2 LIQUID (ML) ORAL
Qty: 144 LOZENGE | Refills: 0 | Status: SHIPPED | OUTPATIENT
Start: 2022-11-02 | End: 2023-02-01 | Stop reason: SDUPTHER

## 2022-11-02 NOTE — Clinical Note
Pt currently smoke 1-1.5 packs of cigarettes per day and will begin weekly tobacco cessation sessions. Pt agreed to take previously prescribed tobacco cessation medication regimen of 21 mg nicotine patches and 2 mg nicotine lozenges. Prescribed medications will be managed by physician and monitored by CTTS. Initial CO measurement=21 ppm (0-6 ppm is a non smoker)

## 2022-11-04 ENCOUNTER — TELEPHONE (OUTPATIENT)
Dept: ENDOSCOPY | Facility: HOSPITAL | Age: 67
End: 2022-11-04
Payer: COMMERCIAL

## 2022-11-10 ENCOUNTER — CLINICAL SUPPORT (OUTPATIENT)
Dept: SMOKING CESSATION | Facility: CLINIC | Age: 67
End: 2022-11-10
Payer: COMMERCIAL

## 2022-11-10 DIAGNOSIS — F17.200 NICOTINE DEPENDENCE: Primary | ICD-10-CM

## 2022-11-10 PROCEDURE — 99404 PR PREVENT COUNSEL,INDIV,60 MIN: ICD-10-PCS | Mod: S$GLB,,,

## 2022-11-10 PROCEDURE — 99404 PREV MED CNSL INDIV APPRX 60: CPT | Mod: S$GLB,,,

## 2022-11-10 NOTE — PROGRESS NOTES
Individual Follow-Up Form    11/10/2022    Quit Date: TBD    Clinical Status of Patient: Outpatient    Length of Service: 60 minutes    Continuing Medication: yes  Patches or Nicotine Lozenges    Other Medications: none     Target Symptoms: Withdrawal and medication side effects. The following were  rated moderate (3) to severe (4) on TCRS:  Moderate (3): increased appetite  Severe (4): none    Comments: Pt seen in clinic for tobacco cessation . Pt currently smoke 21 cigarettes per day; down from 1.5 packs per day. Pt commended for the accomplishment thus far. Pt remain on prescribed tobacco cessation medication regimen of 21 mg nicotine patches and 2 mg nicotine lozenges without any negative side effects at this time. Discussed and reviewed cues, triggers, rate reduction, and delay strategies. Pt denies any abnormal behavior or mental changes at this time. Pt will continue with therapy sessions and medication monitoring by CTTS. Prescribed medication management will be by physician.     Diagnosis: F17.200    Next Visit: 2 weeks

## 2022-11-10 NOTE — Clinical Note
Pt seen in clinic for tobacco cessation . Pt currently smoke 21 cigarettes per day; down from 1.5 packs per day. Pt commended for the accomplishment thus far. Pt remain on prescribed tobacco cessation medication regimen of 21 mg nicotine patches and 2 mg nicotine lozenges without any negative side effects at this time. Discussed and reviewed cues, triggers, rate reduction, and delay strategies. Pt denies any abnormal behavior or mental changes at this time. Pt will continue with therapy sessions and medication monitoring by CTTS. Prescribed medication management will be by physician.

## 2022-11-30 ENCOUNTER — CLINICAL SUPPORT (OUTPATIENT)
Dept: SMOKING CESSATION | Facility: CLINIC | Age: 67
End: 2022-11-30
Payer: COMMERCIAL

## 2022-11-30 DIAGNOSIS — F17.200 NICOTINE DEPENDENCE: Primary | ICD-10-CM

## 2022-11-30 PROCEDURE — 99404 PR PREVENT COUNSEL,INDIV,60 MIN: ICD-10-PCS | Mod: S$GLB,,,

## 2022-11-30 PROCEDURE — 99404 PREV MED CNSL INDIV APPRX 60: CPT | Mod: S$GLB,,,

## 2022-11-30 NOTE — Clinical Note
Pt seen in clinic for tobacco cessation. Pt currently smoke 16 cigarettes per day; down from 1.5 packs per day. Pt commended for the accomplishment thus far. Pt remain on prescribed tobacco cessation medication regimen of 21 mg nicotine patches and 2 mg nicotine patches without any negative side effects at this time. Discussed and reviewed strategies, cues, and triggers. Introduced the negative impact of tobacco on health, the health advantages of discontinuing the use of tobacco, time line improved health changes after a quit, withdrawal issues to expect from nicotine and habit, and ways to achieve the goal of a quit. Pt denies any abnormal behavior or mental changes at this time. Pt will continue with therapy sessions and medication monitoring by CTTS. Prescribed medication management will be by physician. Today's CO measurement=16 ppm (0-6 ppm is a non smoker)

## 2022-11-30 NOTE — PROGRESS NOTES
Individual Follow-Up Form    11/30/2022    Quit Date: TBD    Clinical Status of Patient: Outpatient    Length of Service: 60 minutes    Continuing Medication: yes  Patches    Other Medications: lozenges     Target Symptoms: Withdrawal and medication side effects. The following were  rated moderate (3) to severe (4) on TCRS:  Moderate (3): none  Severe (4): none    Comments: Pt seen in clinic for tobacco cessation. Pt currently smoke 16 cigarettes per day; down from 1.5 packs per day. Pt commended for the accomplishment thus far. Pt remain on prescribed tobacco cessation medication regimen of 21 mg nicotine patches and 2 mg nicotine patches without any negative side effects at this time. Discussed and reviewed strategies, cues, and triggers. Introduced the negative impact of tobacco on health, the health advantages of discontinuing the use of tobacco, time line improved health changes after a quit, withdrawal issues to expect from nicotine and habit, and ways to achieve the goal of a quit. Pt denies any abnormal behavior or mental changes at this time. Pt will continue with therapy sessions and medication monitoring by CTTS. Prescribed medication management will be by physician. Today's CO measurement=16 ppm (0-6 ppm is a non smoker)    Diagnosis: F17.200    Next Visit: 1 week

## 2022-12-07 ENCOUNTER — CLINICAL SUPPORT (OUTPATIENT)
Dept: SMOKING CESSATION | Facility: CLINIC | Age: 67
End: 2022-12-07
Payer: COMMERCIAL

## 2022-12-07 DIAGNOSIS — F17.200 NICOTINE DEPENDENCE: Primary | ICD-10-CM

## 2022-12-07 PROCEDURE — 99404 PREV MED CNSL INDIV APPRX 60: CPT | Mod: S$GLB,,,

## 2022-12-07 PROCEDURE — 99404 PR PREVENT COUNSEL,INDIV,60 MIN: ICD-10-PCS | Mod: S$GLB,,,

## 2022-12-07 NOTE — Clinical Note
Pt seen in clinic for tobacco cessation. Pt continue to smoke 16 cigarettes per day; down from 1.5 packs per day. Pt remain on prescribed tobacco cessation medication regimen of 21 mg nicotine patches and 2 mg nicotine lozenges; without any negative side effects at this time. Discussed and reviewed strategies, controlling environment, cues, triggers, new goals set. Introduced high risk situations with preparation interventions, caffeine similarities with withdrawal issues of habit and nicotine, Alcohol, Understanding urges, cravings, stress and relaxation. Pt denies any abnormal behavior or mental changes at this time. Pt will continue with therapy sessions and medication monitoring by CTTS. Prescribed medication management will be by physician. Today's CO measurement=11 ppm (0-6 ppm is a non smoker)

## 2022-12-07 NOTE — PROGRESS NOTES
Individual Follow-Up Form    12/7/2022    Quit Date: TBD    Clinical Status of Patient: Outpatient    Length of Service: 60 minutes    Continuing Medication: yes  Patches or Nicotine Lozenges    Other Medications: none     Target Symptoms: Withdrawal and medication side effects. The following were  rated moderate (3) to severe (4) on TCRS:  Moderate (3): none  Severe (4): none    Comments: Pt seen in clinic for tobacco cessation. Pt continue to smoke 16 cigarettes per day; down from 1.5 packs per day. Pt remain on prescribed tobacco cessation medication regimen of 21 mg nicotine patches and 2 mg nicotine lozenges; without any negative side effects at this time. Discussed and reviewed strategies, controlling environment, cues, triggers, new goals set. Introduced high risk situations with preparation interventions, caffeine similarities with withdrawal issues of habit and nicotine, Alcohol, Understanding urges, cravings, stress and relaxation. Pt denies any abnormal behavior or mental changes at this time. Pt will continue with therapy sessions and medication monitoring by CTTS. Prescribed medication management will be by physician. Today's CO measurement=11 ppm (0-6 ppm is a non smoker)    Diagnosis: F17.200    Next Visit: 1 week

## 2023-01-03 ENCOUNTER — CLINICAL SUPPORT (OUTPATIENT)
Dept: SMOKING CESSATION | Facility: CLINIC | Age: 68
End: 2023-01-03
Payer: COMMERCIAL

## 2023-01-03 DIAGNOSIS — F17.200 NICOTINE DEPENDENCE: Primary | ICD-10-CM

## 2023-01-03 PROCEDURE — 99999 PR PBB SHADOW E&M-EST. PATIENT-LVL I: ICD-10-PCS | Mod: PBBFAC,,,

## 2023-01-03 PROCEDURE — 99407 BEHAV CHNG SMOKING > 10 MIN: CPT | Mod: S$GLB,,,

## 2023-01-03 PROCEDURE — 99407 PR TOBACCO USE CESSATION INTENSIVE >10 MINUTES: ICD-10-PCS | Mod: S$GLB,,,

## 2023-01-03 PROCEDURE — 99999 PR PBB SHADOW E&M-EST. PATIENT-LVL I: CPT | Mod: PBBFAC,,,

## 2023-01-04 ENCOUNTER — CLINICAL SUPPORT (OUTPATIENT)
Dept: SMOKING CESSATION | Facility: CLINIC | Age: 68
End: 2023-01-04
Payer: COMMERCIAL

## 2023-01-04 DIAGNOSIS — F17.200 NICOTINE DEPENDENCE: Primary | ICD-10-CM

## 2023-01-04 DIAGNOSIS — Z72.0 TOBACCO ABUSE: ICD-10-CM

## 2023-01-04 PROCEDURE — 99404 PR PREVENT COUNSEL,INDIV,60 MIN: ICD-10-PCS | Mod: S$GLB,,,

## 2023-01-04 PROCEDURE — 99404 PREV MED CNSL INDIV APPRX 60: CPT | Mod: S$GLB,,,

## 2023-01-04 RX ORDER — IBUPROFEN 200 MG
1 TABLET ORAL DAILY
Qty: 28 PATCH | Refills: 0 | Status: SHIPPED | OUTPATIENT
Start: 2023-01-04 | End: 2023-02-01 | Stop reason: SDUPTHER

## 2023-01-04 NOTE — PROGRESS NOTES
Individual Follow-Up Form    1/4/2023    Quit Date: TBD    Clinical Status of Patient: Outpatient    Length of Service: 60 minutes    Continuing Medication: yes  Patches or Nicotine Lozenges    Other Medications: none     Target Symptoms: Withdrawal and medication side effects. The following were  rated moderate (3) to severe (4) on TCRS:  Moderate (3): desire/crave  Severe (4): increase appetite    Comments: Pt seen in clinic for tobacco cessation. Pt currently smoke 1/2 pack of cigarettes per day; down from 1-1.5 packs per day. Pt commended for the accomplishment thus far. Pt remain on prescribed tobacco cessation medication regimen of 21 mg nicotine patches and 2 mg nicotine lozenges without any negative side effects at this time. Discussed and reviewed strategies, habitual behavior, stress, and high risk situations. Introduced stress with addition interventions, SOLVE, relaxation with interventions, nutrition, exercise, weight gain, and the importance of rewarding oneself for accomplishments toward becoming tobacco free. Open discussion of all items with interventions.  Pt denies any abnormal behavior or mental changes at this time. Pt will continue with therapy sessions and medication monitoring by CTTS. Prescribed medication management will be by physician. Today's CO measurement=20 ppm (0-6 ppm is a non smoker)    Diagnosis: F17.200    Next Visit: 1 week

## 2023-01-04 NOTE — Clinical Note
Pt seen in clinic for tobacco cessation. Pt currently smoke 1/2 pack of cigarettes per day; down from 1-1.5 packs per day. Pt commended for the accomplishment thus far. Pt remain on prescribed tobacco cessation medication regimen of 21 mg nicotine patches and 2 mg nicotine lozenges without any negative side effects at this time. Discussed and reviewed strategies, habitual behavior, stress, and high risk situations. Introduced stress with addition interventions, SOLVE, relaxation with interventions, nutrition, exercise, weight gain, and the importance of rewarding oneself for accomplishments toward becoming tobacco free. Open discussion of all items with interventions.  Pt denies any abnormal behavior or mental changes at this time. Pt will continue with therapy sessions and medication monitoring by CTTS. Prescribed medication management will be by physician. Today's CO measurement=20 ppm (0-6 ppm is a non smoker)

## 2023-01-06 ENCOUNTER — OFFICE VISIT (OUTPATIENT)
Dept: OPTOMETRY | Facility: CLINIC | Age: 68
End: 2023-01-06
Payer: COMMERCIAL

## 2023-01-06 DIAGNOSIS — R73.03 PREDIABETES: ICD-10-CM

## 2023-01-06 DIAGNOSIS — H04.123 DRY EYE SYNDROME, BILATERAL: ICD-10-CM

## 2023-01-06 DIAGNOSIS — H52.4 PRESBYOPIA: Primary | ICD-10-CM

## 2023-01-06 DIAGNOSIS — H25.13 NUCLEAR SCLEROSIS, BILATERAL: ICD-10-CM

## 2023-01-06 DIAGNOSIS — G47.33 OSA ON CPAP: ICD-10-CM

## 2023-01-06 DIAGNOSIS — Z01.00 ROUTINE EYE EXAM: ICD-10-CM

## 2023-01-06 PROCEDURE — 92015 PR REFRACTION: ICD-10-PCS | Mod: S$GLB,,, | Performed by: OPTOMETRIST

## 2023-01-06 PROCEDURE — 92015 DETERMINE REFRACTIVE STATE: CPT | Mod: S$GLB,,, | Performed by: OPTOMETRIST

## 2023-01-06 PROCEDURE — 99999 PR PBB SHADOW E&M-EST. PATIENT-LVL III: ICD-10-PCS | Mod: PBBFAC,,, | Performed by: OPTOMETRIST

## 2023-01-06 PROCEDURE — 92004 COMPRE OPH EXAM NEW PT 1/>: CPT | Mod: S$GLB,,, | Performed by: OPTOMETRIST

## 2023-01-06 PROCEDURE — 99999 PR PBB SHADOW E&M-EST. PATIENT-LVL III: CPT | Mod: PBBFAC,,, | Performed by: OPTOMETRIST

## 2023-01-06 PROCEDURE — 92004 PR EYE EXAM, NEW PATIENT,COMPREHESV: ICD-10-PCS | Mod: S$GLB,,, | Performed by: OPTOMETRIST

## 2023-01-06 NOTE — PROGRESS NOTES
HPI    Annual    Pt states his va is stable. Pt does not have glasses or contact, only uses   readers. Floaters OS, have had then for many years. No flashes. No   photophobia. No glare. No pain. No gtts  Last edited by Tonya Silver on 1/6/2023  1:10 PM.            Assessment /Plan     For exam results, see Encounter Report.    Presbyopia   OK to continue with OTC readers    Nuclear sclerosis, bilateral   Mild, monitor    Dry eye syndrome, bilateral  BARAK on CPAP   Use PF ssytane complete BID/PRN    Prediabetes  Routine eye exam  -good internal ocular health, monitor yearly      RTC 1 year

## 2023-01-18 ENCOUNTER — CLINICAL SUPPORT (OUTPATIENT)
Dept: SMOKING CESSATION | Facility: CLINIC | Age: 68
End: 2023-01-18
Payer: COMMERCIAL

## 2023-01-18 DIAGNOSIS — F17.200 NICOTINE DEPENDENCE: Primary | ICD-10-CM

## 2023-01-18 PROCEDURE — 99404 PR PREVENT COUNSEL,INDIV,60 MIN: ICD-10-PCS | Mod: S$GLB,,,

## 2023-01-18 PROCEDURE — 99404 PREV MED CNSL INDIV APPRX 60: CPT | Mod: S$GLB,,,

## 2023-01-18 NOTE — PROGRESS NOTES
Individual Follow-Up Form    1/18/2023    Quit Date: TBD    Clinical Status of Patient: Outpatient    Length of Service: 60 minutes    Continuing Medication: yes  Patches or Nicotine Lozenges    Other Medications: none     Target Symptoms: Withdrawal and medication side effects. The following were  rated moderate (3) to severe (4) on TCRS:  Moderate (3): none  Severe (4): none    Comments: Pt seen in clinic for tobacco cessation. Pt currently smoke 1/2 pack of cigarettes per day; down from two packs per day. Pt commended for the accomplishment thus far. Pt remain on prescribed tobacco cessation medication regimen of 21 mg nicotine patches and 2 mg nicotine lozenges without any negative side effects at this time. Discussed and reviewed strategies, habitual behavior, high risks situations, understanding urges and cravings, stress and relaxation with open discussion and additional interventions, Introduced lapses, relapses, understanding them and analyzing the situation of a lapse, conflict issues that may be linked to a lapse.  Pt denies any abnormal behavior or mental changes at this time. Pt will continue with  therapy sessions and medication monitoring by CTTS. Prescribed medication management will be by physician. Today's CO measurement=14 ppm (0-6 ppm is a non smoker)    Diagnosis: F17.200    Next Visit: 1 week

## 2023-01-18 NOTE — Clinical Note
Pt seen in clinic for tobacco cessation. Pt currently smoke 1/2 pack of cigarettes per day; down from two packs per day. Pt commended for the accomplishment thus far. Pt remain on prescribed tobacco cessation medication regimen of 21 mg nicotine patches and 2 mg nicotine lozenges without any negative side effects at this time. Discussed and reviewed strategies, habitual behavior, high risks situations, understanding urges and cravings, stress and relaxation with open discussion and additional interventions, Introduced lapses, relapses, understanding them and analyzing the situation of a lapse, conflict issues that may be linked to a lapse.  Pt denies any abnormal behavior or mental changes at this time. Pt will continue with  therapy sessions and medication monitoring by CTTS. Prescribed medication management will be by physician. Today's CO measurement=14 ppm (0-6 ppm is a non smoker)

## 2023-01-26 ENCOUNTER — CLINICAL SUPPORT (OUTPATIENT)
Dept: SMOKING CESSATION | Facility: CLINIC | Age: 68
End: 2023-01-26
Payer: COMMERCIAL

## 2023-01-26 ENCOUNTER — OFFICE VISIT (OUTPATIENT)
Dept: SLEEP MEDICINE | Facility: CLINIC | Age: 68
End: 2023-01-26
Payer: MEDICARE

## 2023-01-26 VITALS
HEART RATE: 64 BPM | WEIGHT: 225 LBS | SYSTOLIC BLOOD PRESSURE: 133 MMHG | BODY MASS INDEX: 31.38 KG/M2 | DIASTOLIC BLOOD PRESSURE: 65 MMHG

## 2023-01-26 DIAGNOSIS — Z78.9 INTOLERANCE OF CONTINUOUS POSITIVE AIRWAY PRESSURE (CPAP) VENTILATION: Primary | ICD-10-CM

## 2023-01-26 DIAGNOSIS — G47.33 OSA ON CPAP: ICD-10-CM

## 2023-01-26 DIAGNOSIS — F17.200 NICOTINE DEPENDENCE: Primary | ICD-10-CM

## 2023-01-26 PROCEDURE — 99999 PR PBB SHADOW E&M-EST. PATIENT-LVL IV: ICD-10-PCS | Mod: PBBFAC,,, | Performed by: NURSE PRACTITIONER

## 2023-01-26 PROCEDURE — 99999 PR PBB SHADOW E&M-EST. PATIENT-LVL IV: CPT | Mod: PBBFAC,,, | Performed by: NURSE PRACTITIONER

## 2023-01-26 PROCEDURE — 99404 PR PREVENT COUNSEL,INDIV,60 MIN: ICD-10-PCS | Mod: S$GLB,,,

## 2023-01-26 PROCEDURE — 99203 PR OFFICE/OUTPT VISIT, NEW, LEVL III, 30-44 MIN: ICD-10-PCS | Mod: S$GLB,,, | Performed by: NURSE PRACTITIONER

## 2023-01-26 PROCEDURE — 99404 PREV MED CNSL INDIV APPRX 60: CPT | Mod: S$GLB,,,

## 2023-01-26 PROCEDURE — 99203 OFFICE O/P NEW LOW 30 MIN: CPT | Mod: S$GLB,,, | Performed by: NURSE PRACTITIONER

## 2023-01-26 PROCEDURE — 99214 OFFICE O/P EST MOD 30 MIN: CPT | Mod: PBBFAC | Performed by: NURSE PRACTITIONER

## 2023-01-26 NOTE — PROGRESS NOTES
Referred by Dr. Fong    CHIEF COMPLAINT: BARAK  HISTORY OF PRESENT ILLNESS:He was diagnosed with sleep apnea by study done at [facility] 2009 revealing AHI 14.9. He has been adherent with cpap ? Pressure since this time. Using original machine. With nose pillows mask w/o chin strap wife denies any recurrent snoring or witnessed apneic pauses. Leaks bothersome and dislikes using therapy. Very much interested in alternative treatment option.     Interrogation, no machine  SOCIAL HISTORY:     PHYSICAL EXAM:   /65   Pulse 64   Wt 102.1 kg (225 lb)   BMI 31.38 kg/m²     2009 PSG AHI 14.9  2011 Titration study effective cpap 8cm (216#)    ASSESSMENT:   BARAK, high mild/AHI 14.9. adherent with cpap but leaks very bothersome/disrupting sleep and dislikes using therapy/connected. Interested in alternative option       PLAN:   1. Requal PSG and refer to ENT re Inspire candidacy. Call tomorrow to obtain machine info(ahi/setting) and plan also email rx for replacement machine once obtain SN and recall #    2. Discussed etiology of BARAK and potential treatment options, which could include weight loss or referral for surgical consideration.   Thank you for allowing me the opportunity to participate in the care of your patient

## 2023-01-26 NOTE — Clinical Note
Pt currently smoke 8 cigarettes per day;down from 2 packs per day. Pt remain on prescribed tobacco cessation medication regimen of 21 mg nicotine patches and 2 mg nicotine lozenges without any negative side effects at this time. Discussed and reviewed strategies, cues, triggers, high risk situations, lapses, relapses, diet, exercise, stress, relaxation, sleep, habitual behavior, and life style changes. Pt's not ready to set a quit date at this time. Pt denies any abnormal behavior or mental changes at this time. Pt will continue with therapy sessions and medication monitoring by CTTS. Prescribed medication management will be by physician. Today's CO measurement=11 ppm (0-6 ppm is a non smoker)

## 2023-01-27 ENCOUNTER — PATIENT MESSAGE (OUTPATIENT)
Dept: SLEEP MEDICINE | Facility: CLINIC | Age: 68
End: 2023-01-27
Payer: MEDICARE

## 2023-01-27 DIAGNOSIS — G47.33 OSA (OBSTRUCTIVE SLEEP APNEA): Primary | ICD-10-CM

## 2023-01-30 NOTE — PROGRESS NOTES
Individual Follow-Up Form    1/30/2023    Quit Date: TBD    Clinical Status of Patient: Outpatient    Length of Service: 60 minutes    Continuing Medication: yes  Patches or Nicotine Lozenges    Other Medications: none     Target Symptoms: Withdrawal and medication side effects. The following were  rated moderate (3) to severe (4) on TCRS:  Moderate (3): increased appetite  Severe (4): none    Comments:  Pt currently smoke 8 cigarettes per day;down from 2 packs per day. Pt remain on prescribed tobacco cessation medication regimen of 21 mg nicotine patches and 2 mg nicotine lozenges without any negative side effects at this time. Discussed and reviewed strategies, cues, triggers, high risk situations, lapses, relapses, diet, exercise, stress, relaxation, sleep, habitual behavior, and life style changes. Pt's not ready to set a quit date at this time. Pt denies any abnormal behavior or mental changes at this time. Pt will continue with therapy sessions and medication monitoring by CTTS. Prescribed medication management will be by physician. Today's CO measurement=11 ppm (0-6 ppm is a non smoker)       Diagnosis: F17.200    Next Visit: 1 week

## 2023-01-30 NOTE — PROGRESS NOTES
Site: [facility]  Date:  1/30/2023  Clinical Status of Patient: Outpatient   Length of Service and Code: 90 minutes - 40814   Number in Attendance: 4  Group Activities/Focus of Group:  orientation, client introductions, completion of TCRS (Tobacco Cessation Rating Scale) learned addiction model, cues/triggers, personal reasons for quitting, medications, goals, quit date    Target symptoms:  withdrawal and medication side effects             The following were rated moderate (3) to severe (4) on TCRS:       Moderate 3: increased appetite     Severe 4:   none  Patient's Response to Intervention: Pt currently smoke 8 cigarettes per day;down from 2 packs per day. Pt remain on prescribed tobacco cessation medication regimen of 21 mg nicotine patches and 2 mg nicotine lozenges without any negative side effects at this time. Discussed and reviewed strategies, cues, triggers, high risk situations, lapses, relapses, diet, exercise, stress, relaxation, sleep, habitual behavior, and life style changes. Pt's not ready to set a quit date at this time. Pt denies any abnormal behavior or mental changes at this time. Pt will continue with therapy sessions and medication monitoring by CTTS. Prescribed medication management will be by physician. Today's CO measurement=11 ppm (0-6 ppm is a non smoker)    Diagnosis: Z72.0    Return to Clinic: 1 week

## 2023-02-01 ENCOUNTER — CLINICAL SUPPORT (OUTPATIENT)
Dept: SMOKING CESSATION | Facility: CLINIC | Age: 68
End: 2023-02-01
Payer: COMMERCIAL

## 2023-02-01 DIAGNOSIS — F17.200 NICOTINE DEPENDENCE: Primary | ICD-10-CM

## 2023-02-01 PROCEDURE — 99404 PR PREVENT COUNSEL,INDIV,60 MIN: ICD-10-PCS | Mod: S$GLB,,,

## 2023-02-01 PROCEDURE — 99404 PREV MED CNSL INDIV APPRX 60: CPT | Mod: S$GLB,,,

## 2023-02-01 RX ORDER — DM/P-EPHED/ACETAMINOPH/DOXYLAM 30-7.5/3
2 LIQUID (ML) ORAL
Qty: 144 LOZENGE | Refills: 0 | Status: SHIPPED | OUTPATIENT
Start: 2023-02-01

## 2023-02-01 RX ORDER — IBUPROFEN 200 MG
1 TABLET ORAL DAILY
Qty: 28 PATCH | Refills: 0 | Status: SHIPPED | OUTPATIENT
Start: 2023-02-01 | End: 2023-03-01 | Stop reason: SDUPTHER

## 2023-02-01 NOTE — PROGRESS NOTES
Individual Follow-Up Form    2/1/2023    Quit Date: TBD    Clinical Status of Patient: Outpatient    Length of Service: 60 minutes    Continuing Medication: yes  Patches or Nicotine Lozenges    Other Medications: none     Target Symptoms: Withdrawal and medication side effects. The following were  rated moderate (3) to severe (4) on TCRS:  Moderate (3): none  Severe (4): none    Comments: Pt seen in clinic for tobacco cessation. Pt currently smoke 7 cigarettes per day; down from two packs per day. Pt remain on prescribed tobacco cessation medication regimen of 21 mg nicotine patches and 2 mg nicotine lozenges without any negative side effects at this time. Pt commended for the accomplishment thus far. Pt agreed to attempt a rate reduction of 6 cpds. Pt denies any abnormal behavior or mental changes at this time. Pt will continue with therapy sessions and medication monitoring by CTTS. Prescribed medication management will be by physician. Today's CO measurement=20 ppm (0-6 ppm is a non smoker)    Diagnosis: F17.200    Next Visit: 1 week

## 2023-02-01 NOTE — Clinical Note
Pt seen in clinic for tobacco cessation. Pt currently smoke 7 cigarettes per day; down from two packs per day. Pt remain on prescribed tobacco cessation medication regimen of 21 mg nicotine patches and 2 mg nicotine lozenges without any negative side effects at this time. Pt commended for the accomplishment thus far. Pt agreed to attempt a rate reduction of 6 cpds. Pt denies any abnormal behavior or mental changes at this time. Pt will continue with therapy sessions and medication monitoring by CTTS. Prescribed medication management will be by physician. Today's CO measurement=20 ppm (0-6 ppm is a non smoker)

## 2023-02-08 ENCOUNTER — CLINICAL SUPPORT (OUTPATIENT)
Dept: SMOKING CESSATION | Facility: CLINIC | Age: 68
End: 2023-02-08
Payer: COMMERCIAL

## 2023-02-08 DIAGNOSIS — F17.200 NICOTINE DEPENDENCE: Primary | ICD-10-CM

## 2023-02-08 PROCEDURE — 99404 PREV MED CNSL INDIV APPRX 60: CPT | Mod: S$GLB,,,

## 2023-02-08 PROCEDURE — 99404 PR PREVENT COUNSEL,INDIV,60 MIN: ICD-10-PCS | Mod: S$GLB,,,

## 2023-02-08 NOTE — Clinical Note
Pt seen in clinic for tobacco cessation. Pt currently smoke 7 cigarettes per day; down from 2 packs per day. Pt remain on prescribed tobacco cessation medication regimen of 21 mg nicotine patches and 2 mg nicotine lozenges. Reviewed cues, triggers, and rate reduction strategy. Pt agreed to attempt a rate reduction of 5-6 cigarettes per day. Pt denies any abnormal behavior or mental changes at this time. Pt will continue with therapy sessions and medication monitoring by CTTS. Prescribed medication management will be by physician. Today's CO measurement=15 ppm (0-6 ppm is a non smoker)

## 2023-02-08 NOTE — PROGRESS NOTES
Individual Follow-Up Form    2/8/2023    Quit Date: TBD    Clinical Status of Patient: Outpatient    Length of Service: 60 minutes    Continuing Medication: yes  Patches or Nicotine Lozenges    Other Medications: none     Target Symptoms: Withdrawal and medication side effects. The following were  rated moderate (3) to severe (4) on TCRS:  Moderate (3): Insomnia  Severe (4): none    Comments: Pt seen in clinic for tobacco cessation. Pt currently smoke 7 cigarettes per day; down from 2 packs per day. Pt remain on prescribed tobacco cessation medication regimen of 21 mg nicotine patches and 2 mg nicotine lozenges. Reviewed cues, triggers, and rate reduction strategy. Pt agreed to attempt a rate reduction of 5-6 cigarettes per day. Pt denies any abnormal behavior or mental changes at this time. Pt will continue with therapy sessions and medication monitoring by CTTS. Prescribed medication management will be by physician. Today's CO measurement=15 ppm (0-6 ppm is a non smoker)    Diagnosis: F17.200    Next Visit: 1 week

## 2023-02-13 ENCOUNTER — CLINICAL SUPPORT (OUTPATIENT)
Dept: SMOKING CESSATION | Facility: CLINIC | Age: 68
End: 2023-02-13
Payer: COMMERCIAL

## 2023-02-13 DIAGNOSIS — F17.200 NICOTINE DEPENDENCE: Primary | ICD-10-CM

## 2023-02-13 PROCEDURE — 99999 PR PBB SHADOW E&M-EST. PATIENT-LVL I: ICD-10-PCS | Mod: PBBFAC,,,

## 2023-02-13 PROCEDURE — 99407 BEHAV CHNG SMOKING > 10 MIN: CPT | Mod: S$GLB,,,

## 2023-02-13 PROCEDURE — 99999 PR PBB SHADOW E&M-EST. PATIENT-LVL I: CPT | Mod: PBBFAC,,,

## 2023-02-13 PROCEDURE — 99407 PR TOBACCO USE CESSATION INTENSIVE >10 MINUTES: ICD-10-PCS | Mod: S$GLB,,,

## 2023-02-13 NOTE — PROGRESS NOTES
Spoke with patient today in regard to smoking cessation progress for 3 month telephone follow up, he states not tobacco free. Patient is currently enrolled in program and states smoking 0.5 ppd of cigarettes down from almost 2 ppd along with the use of nicotine patch daily. Informed patient of benefit period, future follow ups, and contact information if any further help or support is needed. Will complete smart form for 3 month follow up on Quit attempt #2.

## 2023-02-15 ENCOUNTER — CLINICAL SUPPORT (OUTPATIENT)
Dept: SMOKING CESSATION | Facility: CLINIC | Age: 68
End: 2023-02-15
Payer: COMMERCIAL

## 2023-02-15 DIAGNOSIS — F17.200 NICOTINE DEPENDENCE: Primary | ICD-10-CM

## 2023-02-15 PROCEDURE — 90853 PR GROUP PSYCHOTHERAPY: ICD-10-PCS | Mod: S$GLB,,,

## 2023-02-15 PROCEDURE — 90853 GROUP PSYCHOTHERAPY: CPT | Mod: S$GLB,,,

## 2023-02-15 NOTE — Clinical Note
Pt continue to smoke 7 cigarettes per day. Pt remain on prescribed tobacco cessation medication regimen of 21 mg nicotine patches and 2 mg nicotine lozenges; without any negative side effects at this time. Pt agreed to attempt a rate reduction of 6 cpds. Pt denies any abnormal behavior or mental changes at this time. Pt will continue with therapy sessions and medication monitoring by CTTS. Prescribed medication management will be by physician. Today's CO measurement=17 ppm (0-6 ppm is a non smoker)

## 2023-02-15 NOTE — PROGRESS NOTES
Site: [facility]  Date:  2/15/2023  Clinical Status of Patient: Outpatient   Length of Service and Code: 60 minutes - 49151   Number in Attendance: 2  Group Activities/Focus of Group:  orientation, client introductions, completion of TCRS (Tobacco Cessation Rating Scale) learned addiction model, cues/triggers, personal reasons for quitting, medications, goals, quit date    Target symptoms:  withdrawal and medication side effects             The following were rated moderate (3) to severe (4) on TCRS:       Moderate 3: none     Severe 4:   none  Patient's Response to Intervention: Pt continue to smoke 7 cigarettes per day. Pt remain on prescribed tobacco cessation medication regimen of 21 mg nicotine patches and 2 mg nicotine lozenges; without any negative side effects at this time. Pt agreed to attempt a rate reduction of 6 cpds. Pt denies any abnormal behavior or mental changes at this time. Pt will continue with therapy sessions and medication monitoring by CTTS. Prescribed medication management will be by physician. Today's CO measurement=17 ppm (0-6 ppm is a non smoker)    Diagnosis: Z72.0  PReturn to Clinic: 2 weeks

## 2023-02-17 ENCOUNTER — TELEPHONE (OUTPATIENT)
Dept: SLEEP MEDICINE | Facility: OTHER | Age: 68
End: 2023-02-17

## 2023-02-24 ENCOUNTER — TELEPHONE (OUTPATIENT)
Dept: SLEEP MEDICINE | Facility: OTHER | Age: 68
End: 2023-02-24
Payer: MEDICARE

## 2023-02-25 ENCOUNTER — HOSPITAL ENCOUNTER (OUTPATIENT)
Dept: SLEEP MEDICINE | Facility: HOSPITAL | Age: 68
Discharge: HOME | End: 2023-02-25
Attending: PSYCHIATRY & NEUROLOGY | Admitting: PSYCHIATRY & NEUROLOGY
Payer: MEDICARE

## 2023-02-25 DIAGNOSIS — G47.33 OSA ON CPAP: ICD-10-CM

## 2023-02-25 PROCEDURE — 95811 POLYSOM 6/>YRS CPAP 4/> PARM: CPT

## 2023-02-26 NOTE — PROGRESS NOTES
"Education was done via explanation of sleep study process and procedure. All questions were answered.    Pt. did meet criteria for CPAP. Pt tolerated CPAP well. CPAP explored 5 to 17. REM sleep was obtained.    Low sat of 78% was observed in study. EKG revealed NSR. Soft to moderate snoring was heard during diagnostic portion of study, Large Eson Nasal Mask was used during CPAP titration. Pt response to titration in a.m.  "It was fine"    Thank you letter was given in a.m  "

## 2023-03-01 ENCOUNTER — CLINICAL SUPPORT (OUTPATIENT)
Dept: SMOKING CESSATION | Facility: CLINIC | Age: 68
End: 2023-03-01
Payer: COMMERCIAL

## 2023-03-01 DIAGNOSIS — F17.200 NICOTINE DEPENDENCE: Primary | ICD-10-CM

## 2023-03-01 PROCEDURE — 90853 PR GROUP PSYCHOTHERAPY: ICD-10-PCS | Mod: S$GLB,,,

## 2023-03-01 PROCEDURE — 90853 GROUP PSYCHOTHERAPY: CPT | Mod: S$GLB,,,

## 2023-03-01 RX ORDER — IBUPROFEN 200 MG
1 TABLET ORAL DAILY
Qty: 28 PATCH | Refills: 0 | Status: SHIPPED | OUTPATIENT
Start: 2023-03-01 | End: 2023-03-30 | Stop reason: SDUPTHER

## 2023-03-01 NOTE — Clinical Note
Pt seen in clinic for tobacco cessation. Pt currently smoke 6-7 cigarettes per day; down from one pack per day. Pt commended for the accomplishment thus far. Pt remain on prescribed tobacco cessation medication regimen of 21 mg nicotine patches and 2 mg nicotine lozenges without any negative side effects at this time. Pt agreed to attempt a  rate reduction of 5 cigarettes per day. Pt denies any abnormal behavior or mental changes at this time. Pt will continue with therapy sessions and medication monitoring by CTTS. Prescribed medication management will be by physician. Today's CO measurement=13 ppm (0-6 ppm is a non smoker)

## 2023-03-01 NOTE — PROGRESS NOTES
Site: [facility]  Date:  3/1/2023  Clinical Status of Patient: Outpatient   Length of Service and Code: 60 minutes - 77788   Number in Attendance: 2  Group Activities/Focus of Group:  orientation, client introductions, completion of TCRS (Tobacco Cessation Rating Scale) learned addiction model, cues/triggers, personal reasons for quitting, medications, goals, quit date    Target symptoms:  withdrawal and medication side effects             The following were rated moderate (3) to severe (4) on TCRS:       Moderate 3: none     Severe 4:   none  Patient's Response to Intervention: Pt seen in clinic for tobacco cessation. Pt currently smoke 6-7 cigarettes per day; down from one pack per day. Pt commended for the accomplishment thus far. Pt remain on prescribed tobacco cessation medication regimen of 21 mg nicotine patches and 2 mg nicotine lozenges without any negative side effects at this time. Pt agreed to attempt a  rate reduction of 5 cigarettes per day. Pt denies any abnormal behavior or mental changes at this time. Pt will continue with therapy sessions and medication monitoring by CTTS. Prescribed medication management will be by physician. Today's CO measurement=13 ppm (0-6 ppm is a non smoker)    Diagnosis: Z72.0    Return to Clinic: 2 weeks

## 2023-03-12 PROCEDURE — 95811 PR POLYSOMNOGRAPHY W/CPAP: ICD-10-PCS | Mod: 26,,, | Performed by: PSYCHIATRY & NEUROLOGY

## 2023-03-12 PROCEDURE — 95811 POLYSOM 6/>YRS CPAP 4/> PARM: CPT | Mod: 26,,, | Performed by: PSYCHIATRY & NEUROLOGY

## 2023-03-12 NOTE — PROCEDURES
"    Split-Night Report  [facility] - [address], [place] [address]  Phone: [phone]  Fax: [phone]           Patient Name: OSMEL ESPINO Study Date: 2/25/2023   YOB: 1955 Patient MRN: [id number]     Age:  67 year     Sex: Male Referring Physician: MIKHAIL BRITTON N.P   Height: 5' 11" Recording Tech: Pao Gailndo RRT RPSGT   Weight: 225.0 lbs Scoring Tech: Julito Grover RRT RPSGT   BMI:  31.5 AASM  1B   Diagnostic AHI: 61.7 Interpreting Physician Yadira Sanchez MD   RERA index: - Diagnostic Low O2 sat. 78.0%   Diagnostic RDI: 61.7 Location [facility]     Polysomnogram Data: A full night polysomnogram was performed recording the standard physiologic parameters including EEG, EOG, EMG, EKG, nasal and oral airflow.  Respiratory parameters of chest and abdominal movements are recorded with Peizo-Crystal motion transducers.  Oxygen saturation was recorded by pulse oximetry.    Sleep architecture: This was a split night study. During the diagnostic portion of the study, the patient fell asleep in 0.0 minutes. Sleep efficiency was 100.0%. Total sleep time (TST) during the diagnostic portion was 51.5 minutes. REM latency was - minutes. Sleep architecture in the diagnostic part was significantly disrupted due to underlying sleep apnea.    Respiratory: Moderate snoring was present. During the diagnostic portion of the study, the polysomnogram revealed a presence of - obstructive, - central, and - mixed apneas resulting in an Apnea index of - events per hour.  There were 53 hypopneas resulting in a Hypopnea index of 61.7 events per hour.  The combined Apnea/Hypopnea index was 61.7 events per hour.  There were a total of - RERA events resulting in a Respiratory Disturbance Index (RDI) of 61.7 events per hour.  Lowest oxygen saturation was 78.0% and time spent ?88% oxygen saturation was - minutes (-).    During diagnostic portion End Tidal CO2 during sleep ranged from - to " - mmHg, was greater than 50 mmHg for - minutes and greater than 55 mmHg for - minutes.  Transcutaneous CO2 during sleep ranged from - to - mmHg, was greater than 50 mmHg for - minutes and greater than 55 mmHg for - minutes.    Motor movement / Parasomnia: There were no significant limb movements of sleep noted. The total limb movement index was - (- with arousal).     Cardiac: Cardiac rhythm monitoring revealed a sinus rhythm.    During the diagnostic portion of the study, the average pulse rate was 62.4 bpm.  The minimum pulse rate was 27.0 bpm while the maximum pulse rate was 89.0 bpm. During the treatment portion of the study, the average pulse rate was 59.2 bpm.  The minimum pulse rate was 51.0 bpm while the maximum pulse rate was 71.0 bpm.     CPAP  titration: The patient qualified for split night. During the treatment portion of the study, CPAP  (continuous positive airway pressure) was from 5* cm/H20 up to 17* cm of water using a  Eson F&P nasal  large  mask, chin strap, CFlex at 3, and heated humidification. Initial improvement was observed on 12  cm H2O controlling respiratory events in supine NREM sleep. Further improvemnet was observed on 15 cm H2O in terms of lower arousal index and SAO2 min. Higher pressures resulted in further improvement .   Effective control of sleep disordered breathing  was not achieved  as REM sleep was not obsered. Final AHI at this pressure was below 5/hour.          Patient perception: Following the study, the patient indicated regarding CPAP, itwas fine. On a post-sleep study questionnaire, the patient indicated that sleep was the same in the lab than compared to home.      IMPRESSION:  Obstructive Sleep Apnea (G47.33), very severe based on AHI criteria. The patient qualified for a split night study. AHI is worse as compared to his previous study.   Best control of sleep disordered breathing was achieved with CPAP  at 15 cm of water.    RECOMMENDATION:   APAP 12-20 cm H2O  "cm, mask of patient's choice, chin strap, and heated humidification, which is essential in conjunction with PAP to prevent airway drying and irritation.              [facility] [facility]    Split-Night Report    Patient Name: OSMEL ESPINO Study Date: 2/25/2023   YOB: 1955 MRN #: [id number]   Age: 67 year ELEUTERIO #: [id number]   Sex: Male Referring Provider: MIKHAIL BRITTON N.P   Height: 5' 11" Recording Tech: Pao Galindo RRT RPSGT   Weight: 225.0 lbs Scoring Tech: Julito Grover RRT RPSGT   BMI: 31.5 Interpreting Physician: Yadira Sanchez MD   ESS: - Neck Circumference: -   Study Overview    DIAGNOSTIC TREATMENT   Lights Off: 10:03:30 PM Lights Off: 01:06:00 AM   Lights On: 12:53:00 AM Lights On: 05:26:00 AM   Time in Bed: 51.5 min. Time in Bed: 187.5 min.   Total Sleep Time: 51.5 min. Total Sleep Time: 187.5 min.   Sleep Efficiency: 100.0% Sleep Efficiency: 100.0%   Sleep Latency: 0.0 min. Sleep Latency: 0.0 min.   REM Latency from Sleep Onset: - min. REM Latency from Sleep Onset: - min.   Wake After Sleep Onset: - min. Wake After Sleep Onset: - min.     DIAGNOSTIC TREATMENT    Count Index  Count Index   Awakenings: - 0.0 Awakenings: - 0.0   Arousals: 79 92.0 Arousals: 140 44.8   Apneas & Hypopneas: 53 61.7 Apneas & Hypopneas: 31 9.9    Limb Movements: - - Limb Movements: 34 10.9   Snores: - - Snores: - -   Desaturations: 28 32.6 Desaturations: 42 13.4   Minimum SpO2 TST: 78.0% Minimum SpO2 TST: 79.0%       Sleep Architecture     DIAGNOSTIC TREATMENT ENTIRE NIGHT   Stages Time (min) % TST Time (min) % TST Time (min) % TST   WAKE 138.0  81.5  219.5    Stage N1 45.5 88.3% 101.5 54.1% 147.0 61.5%   Stage N2 6.0 11.7% 66.0 35.2% 72.0 30.1%   Stage N3 0.0 0.0% 20.0 10.7% 20.0 8.4%   REM 0.0 0.0% 0.0 0.0% 0.0 0.0%       Arousal Summary     DIAGNOSTIC TREATMENT    NREM REM TST Index NREM REM TST Index   Respiratory Arousals 27 - 27 31.5 7 - 7 2.2   PLM Arousals - - - - 24 - 24 7.7   Isolated LM " Arousals - - - - - - - -   Spontaneous Arousals 52 - 52 60.6 110 - 110 35.2   Total 79 - 79 92.0 140 - 140 44.8       Respiratory Summary    DIAGNOSTIC By Sleep Stage By Body Position Total    NREM REM Supine Non-Supine    Time (min) 51.5 0.0 22.5 29.0 51.5           Obstructive Apnea - - - - -   Mixed Apnea - - - - -   Central Apnea - - - - -   Total Apneas - - - - -   Total Apnea Index - - - - -           Total Hypopnea 53 - 24 29 53   Total Hypopnea Index 61.7 - 64.0 60.0 61.7           Apnea & Hypopnea 53 - 24 29 53   Apnea & Hypopnea Index 61.7 - 64.0 60.0 61.7           RERAs - - - - -   RERA Index - - - - -           RDI 61.7 - 64.0 60.0 61.7      TREATMENT By Sleep Stage By Body Position Total    NREM REM Supine Non-Supine    Time (min) 187.5 0.0 122.5 65.0 187.5           Obstructive Apnea - - - - -   Mixed Apnea - - - - -   Central Apnea - - - - -   Total Apneas - - - - -   Total Apnea Index - - - - -           Total Hypopnea 31 - 31 - 31   Total Hypopnea Index 9.9 - 15.2 - 9.9           Apnea & Hypopnea 31 - 31 - 31   Apnea & Hypopnea Index 9.9 - 15.2 - 9.9           RERAs - - - - -   RERA Index - - - - -           RDI 9.9 - 15.2 - 9.9      Scoring Criteria: Hypopneas scored at Choose an item.% desaturation criteria.    Respiratory Event Durations     DIAGNOSTIC TREATMENT   Apnea NREM REM NREM REM   Average (seconds) - - - -   Maximum (seconds) - - - -   Hypopnea       Average (seconds) 16.0 - 17.5 -   Maximum (seconds) 30.0 - 27.5 -       Oxygen Saturation Summary     DIAGNOSTIC TREATMENT    Wake NREM REM TST Wake NREM REM TST   Average SpO2 93.5% 92.2% - 92.2% 96.7% 96.2% - 96.2%   Minimum SpO2 78.0% 78.0% - 78.0% 79.0% 79.0% - 79.0%   Maximum SpO2 100.0% 100.0% - 100.0%  100.0% 100.0% - 100.0%     DIAGNOSTIC   Oxygen Saturation Distribution    Range (%) Time in range (min) Time in range (%)   90.0 - 100.0 37.2 75.1%   80.0 - 90.0 12.3 24.7%   70.0 - 80.0 0.1 0.2%   60.0 - 70.0 - -   50.0 - 60.0 - -    0.0 - 50.0 - -   Time Spent ?88% SpO2    Range (%) Time in range (min) Time in range (%)   0.0 - 88.0 5.2 10.5%          Count Index   Desaturations 28 32.6      TREATMENT   Oxygen Saturation Distribution    Range (%) Time in range (min) Time in range (%)   90.0 - 100.0 259.6 97.1%   80.0 - 90.0 7.5 2.8%   70.0 - 80.0 0.1 0.0%   60.0 - 70.0 - -   50.0 - 60.0 - -   0.0 - 50.0 - -   Time Spent ?88% SpO2    Range (%) Time in range (min) Time in range (%)   0.0 - 88.0 4.1 1.5%      Count Index   Desaturations 42 13.4      Limb Movement Summary     DIAGNOSTIC TREATMENT    Count Index Count Index   Isolated Limb Movements - - - -   Periodic Limb Movements (PLMs) - - 34 10.9   Total Limb Movements - - 34 10.9     Cardiac Summary     DIAGNOSTIC TREATMENT    Wake NREM REM Total Wake NREM REM Total   Average PA (BPM) 63.5 62.4 - 63.2 61.3 59.2 - 59.8   Minimum PA (BPM) 29.0 27.0 - 27.0 53.0 51.0 - 51.0   Maximum PA (BPM) 85.0 89.0 - 89.0 87.0 71.0 - 87.0         Diagnostic EtCO2    Stage Min (mmHg) Average (mmHg) Max (mmHg)   Wake - - -   NREM(1+2+3) - - -   REM - - -       Range (mmHg) Time in range (min) Time in range (%)   20.0 - 40.0 - -   40.0 - 50.0 - -   50.0 - 55.0 - -   55.0 - 100.0 - -   Excluded data <20.0 & >65.0 189.5 100.0%       TcCO2 Summary    DIAGNOSTIC    Stage Min (mmHg) Average (mmHg) Max (mmHg)   Wake - - -   NREM(1+2+3) - - -   REM - - -       Range (mmHg) Time in range (min) Time in range (%)   - - -   - - -   - - -   - - -   - - -       TREATMENT    Stage Min (mmHg) Average (mmHg) Max (mmHg)   Wake - - -   NREM(1+2+3) - - -   REM - - -       Range (mmHg) Time in range (min) Time in range (%)   - - -   - - -   - - -   - - -   - - -       Comments    -      Titration Summary    PAP Device PAP Level O2 Level Time (min) TST (min) NREM (min) REM (min) Wake (min) Sleep Eff% OA# CA# MA# Hyp# AHI RERA RDI Min SpO2 SpO2 ?88% (min) Ar. Index   - Off - 189.5 51.5 51.5 0.0 138.0 27.2% - - - 53 61.7 - 61.7 78.0   5.2 92.0   CPAP 5 - 14.5 3.0 3.0 0.0 11.5 20.7% - - - 2 40.0 - 40.0 91.0  0.0 40.0   CPAP 6 - 8.0 3.5 3.5 0.0 4.5 43.8% - - - 2 34.3 - 34.3 92.0  0.0 85.7   CPAP 7 - 5.0 3.5 3.5 0.0 1.5 70.0% - - - 4 68.6 - 68.6 93.0  0.0 102.9   CPAP 8 - 10.5 8.0 8.0 0.0 2.5 76.2% - - - 4 30.0 - 30.0 94.0  0.0 105.0   CPAP 9 - 6.0 4.0 4.0 0.0 2.0 66.7% - - - 6 90.0 - 90.0 90.0  0.0 120.0   CPAP 10 - 13.0 8.0 8.0 0.0 5.0 61.5% - - - 5 37.5 - 37.5 79.0  0.7 82.5   CPAP 11 - 13.0 11.5 11.5 0.0 1.5 88.5% - - - 6 31.3 - 31.3 80.0  0.1 78.3   CPAP 12 - 11.0 10.0 10.0 0.0 1.0 90.9% - - - - - - - 91.0  0.0 66.0   CPAP 13 - 7.0 7.0 7.0 0.0 0.0 100.0% - - - - - - - 81.0  2.9 60.0   CPAP 14 - 20.5 14.0 14.0 0.0 6.5 68.3% - - - 2 8.6 - 8.6 82.0  0.1 94.3   CPAP 15 - 19.0 5.0 5.0 0.0 14.0 26.3% - - - - - - - 95.0  0.0 48.0   CPAP 16 - 136.0 106.5 106.5 0.0 29.5 78.3% - - - - - - - 85.0  0.1 19.2   CPAP 17 - 5.5 3.5 3.5 0.0 2.0 63.6% - - - - - - - 96.0  0.0 17.1

## 2023-03-14 ENCOUNTER — PATIENT MESSAGE (OUTPATIENT)
Dept: SLEEP MEDICINE | Facility: CLINIC | Age: 68
End: 2023-03-14
Payer: MEDICARE

## 2023-03-15 ENCOUNTER — TELEPHONE (OUTPATIENT)
Dept: SMOKING CESSATION | Facility: CLINIC | Age: 68
End: 2023-03-15
Payer: MEDICARE

## 2023-03-22 ENCOUNTER — TELEPHONE (OUTPATIENT)
Dept: SMOKING CESSATION | Facility: CLINIC | Age: 68
End: 2023-03-22
Payer: MEDICARE

## 2023-03-22 NOTE — TELEPHONE ENCOUNTER
Unsuccessful contact with patient in regards to missing tobacco cessation follow up on 3/15/2023. Left a message with contact information to complete the session.

## 2023-03-24 ENCOUNTER — PATIENT MESSAGE (OUTPATIENT)
Dept: FAMILY MEDICINE | Facility: CLINIC | Age: 68
End: 2023-03-24
Payer: MEDICARE

## 2023-03-24 DIAGNOSIS — F41.9 ANXIETY: ICD-10-CM

## 2023-03-24 RX ORDER — ALPRAZOLAM 0.5 MG/1
0.5 TABLET ORAL NIGHTLY
Qty: 30 TABLET | Refills: 5 | OUTPATIENT
Start: 2023-03-24

## 2023-03-24 RX ORDER — ALPRAZOLAM 0.5 MG/1
0.5 TABLET ORAL NIGHTLY
Qty: 30 TABLET | Refills: 0 | Status: SHIPPED | OUTPATIENT
Start: 2023-03-24 | End: 2023-03-27 | Stop reason: SDUPTHER

## 2023-03-24 NOTE — TELEPHONE ENCOUNTER
No new care gaps identified.  Health system Embedded Care Gaps. Reference number: [id number]. 3/24/2023   10:24:58 AM MARVT

## 2023-03-24 NOTE — TELEPHONE ENCOUNTER
----- Message from Swapna Jones sent at 3/24/2023  1:32 PM CDT -----  .Type:  RX Refill Request    Who Called: pt  Refill or New Rx:refill  RX Name and Strength:ALPRAZolam (XANAX) 0.5 MG tablet  How is the patient currently taking it? (ex. 1XDay):1xday  Is this a 30 day or 90 day RX:30  Preferred Pharmacy with phone number:[facility] [address], [place] [address] [phone]  Local or Mail Order:local  Ordering Provider:Broderick   Would the patient rather a call back or a response via [facility]? call  Best Call Back Number:[phone]  Additional Information:     Pt is completley out of medication and current pharmacy does not have it in stock

## 2023-03-27 ENCOUNTER — PATIENT MESSAGE (OUTPATIENT)
Dept: FAMILY MEDICINE | Facility: CLINIC | Age: 68
End: 2023-03-27
Payer: MEDICARE

## 2023-03-27 DIAGNOSIS — F41.9 ANXIETY: ICD-10-CM

## 2023-03-27 RX ORDER — ALPRAZOLAM 0.5 MG/1
0.5 TABLET ORAL NIGHTLY
Qty: 90 TABLET | Refills: 1 | Status: SHIPPED | OUTPATIENT
Start: 2023-03-27 | End: 2023-04-24 | Stop reason: SDUPTHER

## 2023-03-29 ENCOUNTER — TELEPHONE (OUTPATIENT)
Dept: SMOKING CESSATION | Facility: CLINIC | Age: 68
End: 2023-03-29
Payer: MEDICARE

## 2023-03-29 NOTE — TELEPHONE ENCOUNTER
Returned patients call in regards to rescheduling missed follow up. Left a message with contact information to reschedule.

## 2023-03-30 ENCOUNTER — CLINICAL SUPPORT (OUTPATIENT)
Dept: SMOKING CESSATION | Facility: CLINIC | Age: 68
End: 2023-03-30
Payer: COMMERCIAL

## 2023-03-30 ENCOUNTER — OFFICE VISIT (OUTPATIENT)
Dept: OTOLARYNGOLOGY | Facility: CLINIC | Age: 68
End: 2023-03-30
Payer: COMMERCIAL

## 2023-03-30 VITALS
BODY MASS INDEX: 31.73 KG/M2 | DIASTOLIC BLOOD PRESSURE: 84 MMHG | WEIGHT: 226.63 LBS | HEIGHT: 71 IN | SYSTOLIC BLOOD PRESSURE: 126 MMHG

## 2023-03-30 DIAGNOSIS — Z78.9 INTOLERANCE OF CONTINUOUS POSITIVE AIRWAY PRESSURE (CPAP) VENTILATION: ICD-10-CM

## 2023-03-30 DIAGNOSIS — G47.33 OSA ON CPAP: ICD-10-CM

## 2023-03-30 DIAGNOSIS — F17.200 NICOTINE DEPENDENCE: Primary | ICD-10-CM

## 2023-03-30 PROCEDURE — 1126F PR PAIN SEVERITY QUANTIFIED, NO PAIN PRESENT: ICD-10-PCS | Mod: CPTII,S$GLB,, | Performed by: OTOLARYNGOLOGY

## 2023-03-30 PROCEDURE — 99203 PR OFFICE/OUTPT VISIT, NEW, LEVL III, 30-44 MIN: ICD-10-PCS | Mod: S$GLB,,, | Performed by: OTOLARYNGOLOGY

## 2023-03-30 PROCEDURE — 3074F SYST BP LT 130 MM HG: CPT | Mod: CPTII,S$GLB,, | Performed by: OTOLARYNGOLOGY

## 2023-03-30 PROCEDURE — 3079F DIAST BP 80-89 MM HG: CPT | Mod: CPTII,S$GLB,, | Performed by: OTOLARYNGOLOGY

## 2023-03-30 PROCEDURE — 3079F PR MOST RECENT DIASTOLIC BLOOD PRESSURE 80-89 MM HG: ICD-10-PCS | Mod: CPTII,S$GLB,, | Performed by: OTOLARYNGOLOGY

## 2023-03-30 PROCEDURE — 1101F PT FALLS ASSESS-DOCD LE1/YR: CPT | Mod: CPTII,S$GLB,, | Performed by: OTOLARYNGOLOGY

## 2023-03-30 PROCEDURE — 3074F PR MOST RECENT SYSTOLIC BLOOD PRESSURE < 130 MM HG: ICD-10-PCS | Mod: CPTII,S$GLB,, | Performed by: OTOLARYNGOLOGY

## 2023-03-30 PROCEDURE — 3288F PR FALLS RISK ASSESSMENT DOCUMENTED: ICD-10-PCS | Mod: CPTII,S$GLB,, | Performed by: OTOLARYNGOLOGY

## 2023-03-30 PROCEDURE — 3008F BODY MASS INDEX DOCD: CPT | Mod: CPTII,S$GLB,, | Performed by: OTOLARYNGOLOGY

## 2023-03-30 PROCEDURE — 99203 OFFICE O/P NEW LOW 30 MIN: CPT | Mod: S$GLB,,, | Performed by: OTOLARYNGOLOGY

## 2023-03-30 PROCEDURE — 99404 PR PREVENT COUNSEL,INDIV,60 MIN: ICD-10-PCS | Mod: S$GLB,,,

## 2023-03-30 PROCEDURE — 3008F PR BODY MASS INDEX (BMI) DOCUMENTED: ICD-10-PCS | Mod: CPTII,S$GLB,, | Performed by: OTOLARYNGOLOGY

## 2023-03-30 PROCEDURE — 99404 PREV MED CNSL INDIV APPRX 60: CPT | Mod: S$GLB,,,

## 2023-03-30 PROCEDURE — 1101F PR PT FALLS ASSESS DOC 0-1 FALLS W/OUT INJ PAST YR: ICD-10-PCS | Mod: CPTII,S$GLB,, | Performed by: OTOLARYNGOLOGY

## 2023-03-30 PROCEDURE — 1126F AMNT PAIN NOTED NONE PRSNT: CPT | Mod: CPTII,S$GLB,, | Performed by: OTOLARYNGOLOGY

## 2023-03-30 PROCEDURE — 3288F FALL RISK ASSESSMENT DOCD: CPT | Mod: CPTII,S$GLB,, | Performed by: OTOLARYNGOLOGY

## 2023-03-30 RX ORDER — SODIUM CHLORIDE 0.9 % (FLUSH) 0.9 %
10 SYRINGE (ML) INJECTION
Status: DISCONTINUED | OUTPATIENT
Start: 2023-03-30 | End: 2023-09-12 | Stop reason: CLARIF

## 2023-03-30 RX ORDER — LIDOCAINE HYDROCHLORIDE 10 MG/ML
1 INJECTION, SOLUTION EPIDURAL; INFILTRATION; INTRACAUDAL; PERINEURAL ONCE
Status: CANCELLED | OUTPATIENT
Start: 2023-03-30 | End: 2023-03-30

## 2023-03-30 RX ORDER — IBUPROFEN 200 MG
1 TABLET ORAL DAILY
Qty: 28 PATCH | Refills: 0 | Status: SHIPPED | OUTPATIENT
Start: 2023-03-30 | End: 2023-04-25 | Stop reason: DRUGHIGH

## 2023-03-30 NOTE — PROGRESS NOTES
OTOLARYNGOLOGY CLINIC NOTE  Date:  03/30/2023     Chief complaint:  Chief Complaint   Patient presents with    inspire consult     Sleep study 2/2023       History of Present Illness  Stef Pool is a 67 y.o. male  presenting today for a new evaluation and treatment of BARAK.     His machine has been recalled but he is still using the one he has. He wakes up tired. It does not stay on his face well. Takes it off at times.     No issues with falling asleep.  Feels not well rested; + snoring, no am headaches. + nocturia  PSG 2-25-23 with AHI of 61.7, no central apneas  Sleep study split night titrated up to 17 to get ahi below 5. Current cpap machine only at 8 and does not get benefit. Uses nasal mask without chin strap . He dislikes treatment and eports a lot of leaks as well. Seen by sleep np jocelyn pittman 1-26-23 and notes reviewed    Past Medical History  Past Medical History:   Diagnosis Date    Anxiety 11/20/2015    Combined hyperlipidemia 11/23/2012    HEARING LOSS     Major depressive disorder, recurrent, moderate 11/23/2012    Obesity (BMI 30.0-34.9) 11/20/2015    diet and exercise changes discussed     BARAK on CPAP 11/20/2015    uses at least 4 hours nightly     Prediabetes 11/23/2016    making dietary and exercise changes and staring Metformin 500 mg XR daily.  recheck in 3 months    Smoker 05/02/2013        Past Surgical History  Past Surgical History:   Procedure Laterality Date    COLONOSCOPY N/A 7/12/2019    Procedure: COLONOSCOPY;  Surgeon: Amrit Reyes MD;  Location: [facility];  Service: Endoscopy;  Laterality: N/A;        Medications  Current Outpatient Medications on File Prior to Visit   Medication Sig Dispense Refill    ALPRAZolam (XANAX) 0.5 MG tablet Take 1 tablet (0.5 mg total) by mouth every evening. 90 tablet 1    aspirin 81 MG Chew Take 1 tablet (81 mg total) by mouth once daily.      atorvastatin (LIPITOR) 40 MG tablet TAKE 1 TABLET BY MOUTH EVERY DAY 90 tablet 3     cholecalciferol, vitamin D3, (VITAMIN D3) 50 mcg (2,000 unit) Cap capsule TAKE 1 CAPSULE (2,000 UNITS TOTAL) BY MOUTH ONCE DAILY. 90 capsule 3    ergocalciferol (ERGOCALCIFEROL) 50,000 unit Cap TAKE 1 CAPSULE BY MOUTH ONE TIME PER WEEK 12 capsule 4    fluticasone propionate (FLONASE) 50 mcg/actuation nasal spray INSTILL 2 SPRAYS (100 MCG TOTAL) BY EACH NOSTRIL ROUTE ONCE DAILY. 48 mL 2    metFORMIN (GLUCOPHAGE-XR) 500 MG ER 24hr tablet TAKE 1 TABLET BY MOUTH TWICE A DAY WITH MEALS 180 tablet 1    nicotine (NICODERM CQ) 21 mg/24 hr Place 1 patch onto the skin once daily. 28 patch 0    nicotine polacrilex 2 MG Lozg Take 1 lozenge (2 mg total) by mouth as needed (1-2 per hour in the place of cigarette (5-10 daily max) (mini). 144 lozenge 0    sertraline (ZOLOFT) 100 MG tablet TAKE 1 TABLET BY MOUTH EVERY DAY 90 tablet 2    tamsulosin (FLOMAX) 0.4 mg Cap TAKE TWO CAPSULES BY MOUTH DAILY IN THE EVENING. 180 capsule 2    buPROPion (WELLBUTRIN XL) 300 MG 24 hr tablet Take 1 tablet (300 mg total) by mouth once daily. 90 tablet 4    sildenafiL (VIAGRA) 100 MG tablet Take 1 tablet (100 mg total) by mouth daily as needed for Erectile Dysfunction. 9 tablet 11     No current facility-administered medications on file prior to visit.       Review of Systems  Review of Systems   Constitutional:  Positive for malaise/fatigue.   Cardiovascular: Negative.    Gastrointestinal: Negative.    Musculoskeletal: Negative.    Skin: Negative.    Neurological: Negative.    Endo/Heme/Allergies:  Bruises/bleeds easily.   Psychiatric/Behavioral: Negative.      Answers submitted by the patient for this visit:  Review of Symptoms Questionnaire  (Submitted on 3/28/2023)  eye itching: Yes  Snoring?: Yes  Sleep Apnea?: Yes  Difficulty urinating?: Yes  Urinating too frequently?: Yes  Social History   reports that he has been smoking cigarettes. He has a 1.00 pack-year smoking history. He has never used smokeless tobacco. He reports current alcohol use.  "He reports that he does not use drugs.     Family History  Family History   Problem Relation Age of Onset    Heart disease Mother         Physical Exam   Vitals:    03/30/23 0858   BP: 126/84    Body mass index is 31.61 kg/m².  Weight: 102.8 kg (226 lb 10.1 oz)   Height: 5' 11" (180.3 cm)     GENERAL: no acute distress.  HEAD: normocephalic.   EYES: lids and lashes normal. No scleral icterus  EARS: external ear without lesion, normal pinna shape and position.   NOSE: external nose without significant bony abnormality  ORAL CAVITY/OROPHARYNX: tongue midline and mobile. Symmetric palate rise. Uvula midline.   NECK: trachea midline.   RESPIRATORY: no stridor, no stertor. Voice normal. Respirations nonlabored.  NEURO: alert, responds to questions appropriately.   PSYCH:mood appropriate      Imaging:  The patient does not have any pertinent and/or recent imaging of the head and neck.     Labs:  CBC  Recent Labs   Lab 03/01/21  0717 04/22/22  0709   WBC 6.17 8.04   Hemoglobin 14.8 15.0   Hematocrit 45.7 46.4   MCV 88 90   Platelets 176 197     BMP  Recent Labs   Lab 10/26/21  0713 04/22/22  0709 10/24/22  0655   Glucose 109 95 104   Sodium 140 141 142   Potassium 4.2 4.2 4.3   Chloride 109 108 109   CO2 23 22 L 24   BUN 16 18 14   Creatinine 1.0 1.0 0.9   Calcium 9.0 9.4 9.0   Magnesium  --  2.1  --      COAGS        Assessment  1. BARAK on CPAP  - Ambulatory consult to ENT    2. Intolerance of continuous positive airway pressure (CPAP) ventilation  - Ambulatory consult to ENT       Plan:  Discussed plan of care with patient in detail and all questions answered. Patient reported understanding of plan of care.   Obstructive sleep apnea, intolerant to CPAP:  we discussed about the pathophysiology of BARAK and untreated moderate to severe BARAK. We discussed that CPAP is the gold standard therapy (I.e most beneficial) for BARAK.  The patient is CPAP intolerant and interested in other options to manage disease.  We discussed about " pros and cons of inspire as well as indications, alternatives .  This included a discussion about the implant itself and what the surgery would entail including incision placement, postop expectations, risks to nerves and what this would result in ( lip weakness, tongue weakness) . We also discussed the purpose of the DISE and how it will assess sources of obstruction. We discussed other surgical options that exist but are not curative of BARAK such as septoplasty that can help with mask tolerance. I will look for this at time of surgery to see if the pt would be candidate and present all options for treatment after DISE . We discussed nonsurgical option of oral appliance as well . List of dental providers in the [facility] that make oral appliance was given to patient     The patient meets criteria for DISE based on BMI and AHI. The patient is interested in proceeding to see if DISE indicates obstructive pattern that would respond to inspire device.  DISE scheduled for June 13 per his request for this date ( earlier spots offered)  F/u after dise   I spent a total of 30 minutes on the day of the visit.  This includes face to face time and non-face to face time preparing to see the patient (eg, review of tests), obtaining and/or reviewing separately obtained history, documenting clinical information in the electronic or other health record, independently interpreting results and communicating results to the patient/family/caregiver, or care coordinator.    Please be aware that this note has been generated with the assistance of MModal voice-to-text.  Please excuse any spelling or grammatical errors.

## 2023-03-30 NOTE — PROGRESS NOTES
Individual Follow-Up Form    3/30/2023    Quit Date: TBD    Clinical Status of Patient: Outpatient    Length of Service: 60 minutes    Continuing Medication: yes  Patches    Other Medications: none     Target Symptoms: Withdrawal and medication side effects. The following were  rated moderate (3) to severe (4) on TCRS:  Moderate (3): none  Severe (4): none    Comments: Pt seen in clinic for tobacco cessation. Pt currently smoke 7 cigarettes per day. Pt remain on prescribed tobacco cessation medication regimen of 21 mg nicotine patches without any negative side effects at this time. Reviewed rate reduction and delay strategies. Pt agreed to attempt a rate reduction of 6 cpds. Pt denies any abnormal behavior or mental changes at this time. Pt will continue with therapy sessions and medication monitoring by CTTS. Prescribed medication management will be by physician. Today's CO measurement=19 ppm (0-6 ppm is a non smoker)    Diagnosis: F17.200    Next Visit: 2 weeks

## 2023-03-30 NOTE — Clinical Note
Pt seen in clinic for tobacco cessation. Pt currently smoke 7 cigarettes per day. Pt remain on prescribed tobacco cessation medication regimen of 21 mg nicotine patches without any negative side effects at this time. Reviewed rate reduction and delay strategies. Pt agreed to attempt a rate reduction of 6 cpds. Pt denies any abnormal behavior or mental changes at this time. Pt will continue with therapy sessions and medication monitoring by CTTS. Prescribed medication management will be by physician. Today's CO measurement=19 ppm (0-6 ppm is a non smoker)

## 2023-04-13 ENCOUNTER — CLINICAL SUPPORT (OUTPATIENT)
Dept: SMOKING CESSATION | Facility: CLINIC | Age: 68
End: 2023-04-13
Payer: COMMERCIAL

## 2023-04-13 DIAGNOSIS — F17.200 NICOTINE DEPENDENCE: Primary | ICD-10-CM

## 2023-04-13 PROCEDURE — 99404 PREV MED CNSL INDIV APPRX 60: CPT | Mod: S$GLB,,,

## 2023-04-13 PROCEDURE — 99404 PR PREVENT COUNSEL,INDIV,60 MIN: ICD-10-PCS | Mod: S$GLB,,,

## 2023-04-13 NOTE — PROGRESS NOTES
Individual Follow-Up Form    4/13/2023    Quit Date: TBD    Clinical Status of Patient: Outpatient    Length of Service: 60 minutes    Continuing Medication: yes  Patches or Nicotine Lozenges    Other Medications: none     Target Symptoms: Withdrawal and medication side effects. The following were  rated moderate (3) to severe (4) on TCRS:  Moderate (3): none  Severe (4): none    Comments: Pt seen in clinic for tobacco cessation. Pt currently smoke 5 cigarettes per day; down from one pack per day. Pt remain on prescribed tobacco cessation medication regimen of 21 mg nicotine patches and 2 mg nicotine lozenges without any negative side effects at this time.  Reviewed rate reduction, delay strategies, and habitual behavior. Pt agree to attempt a rate reduction of 2 cpds. Pt denies any abnormal behavior or mental changes at this time. Pt will continue with  therapy sessions and medication monitoring by CTTS. Prescribed medication management will be by physician. Today's CO measurement=10 ppm (0-6 ppm is a non smoker)    Diagnosis: F17.200    Next Visit: 2 weeks

## 2023-04-13 NOTE — Clinical Note
Pt seen in clinic for tobacco cessation. Pt currently smoke 5 cigarettes per day; down from one pack per day. Pt remain on prescribed tobacco cessation medication regimen of 21 mg nicotine patches and 2 mg nicotine lozenges without any negative side effects at this time.  Reviewed rate reduction, delay strategies, and habitual behavior. Pt agree to attempt a rate reduction of 2 cpds. Pt denies any abnormal behavior or mental changes at this time. Pt will continue with  therapy sessions and medication monitoring by CTTS. Prescribed medication management will be by physician. Today's CO measurement=10 ppm (0-6 ppm is a non smoker)

## 2023-04-24 ENCOUNTER — LAB VISIT (OUTPATIENT)
Dept: LAB | Facility: HOSPITAL | Age: 68
End: 2023-04-24
Payer: MEDICARE

## 2023-04-24 DIAGNOSIS — Z12.5 SCREENING FOR MALIGNANT NEOPLASM OF PROSTATE: ICD-10-CM

## 2023-04-24 DIAGNOSIS — Z79.899 MEDICATION MANAGEMENT: ICD-10-CM

## 2023-04-24 DIAGNOSIS — R73.03 PREDIABETES: ICD-10-CM

## 2023-04-24 DIAGNOSIS — E55.9 VITAMIN D DEFICIENCY: ICD-10-CM

## 2023-04-24 DIAGNOSIS — E78.2 COMBINED HYPERLIPIDEMIA: ICD-10-CM

## 2023-04-24 LAB
25(OH)D3+25(OH)D2 SERPL-MCNC: 58 NG/ML (ref 30–96)
ALBUMIN SERPL BCP-MCNC: 3.9 G/DL (ref 3.5–5.2)
ALP SERPL-CCNC: 98 U/L (ref 55–135)
ALT SERPL W/O P-5'-P-CCNC: 24 U/L (ref 10–44)
ANION GAP SERPL CALC-SCNC: 7 MMOL/L (ref 8–16)
AST SERPL-CCNC: 22 U/L (ref 10–40)
BASOPHILS # BLD AUTO: 0.04 K/UL (ref 0–0.2)
BASOPHILS NFR BLD: 0.5 % (ref 0–1.9)
BILIRUB SERPL-MCNC: 0.4 MG/DL (ref 0.1–1)
BUN SERPL-MCNC: 17 MG/DL (ref 8–23)
CALCIUM SERPL-MCNC: 9.1 MG/DL (ref 8.7–10.5)
CHLORIDE SERPL-SCNC: 108 MMOL/L (ref 95–110)
CHOLEST SERPL-MCNC: 151 MG/DL (ref 120–199)
CHOLEST/HDLC SERPL: 5.6 {RATIO} (ref 2–5)
CO2 SERPL-SCNC: 25 MMOL/L (ref 23–29)
COMPLEXED PSA SERPL-MCNC: 3.6 NG/ML (ref 0–4)
CREAT SERPL-MCNC: 1 MG/DL (ref 0.5–1.4)
DIFFERENTIAL METHOD: NORMAL
EOSINOPHIL # BLD AUTO: 0.5 K/UL (ref 0–0.5)
EOSINOPHIL NFR BLD: 6.1 % (ref 0–8)
ERYTHROCYTE [DISTWIDTH] IN BLOOD BY AUTOMATED COUNT: 13.5 % (ref 11.5–14.5)
EST. GFR  (NO RACE VARIABLE): >60 ML/MIN/1.73 M^2
ESTIMATED AVG GLUCOSE: 114 MG/DL (ref 68–131)
GLUCOSE SERPL-MCNC: 99 MG/DL (ref 70–110)
HBA1C MFR BLD: 5.6 % (ref 4–5.6)
HCT VFR BLD AUTO: 44.4 % (ref 40–54)
HDLC SERPL-MCNC: 27 MG/DL (ref 40–75)
HDLC SERPL: 17.9 % (ref 20–50)
HGB BLD-MCNC: 14.2 G/DL (ref 14–18)
IMM GRANULOCYTES # BLD AUTO: 0.03 K/UL (ref 0–0.04)
IMM GRANULOCYTES NFR BLD AUTO: 0.4 % (ref 0–0.5)
LDLC SERPL CALC-MCNC: 60.4 MG/DL (ref 63–159)
LYMPHOCYTES # BLD AUTO: 1.9 K/UL (ref 1–4.8)
LYMPHOCYTES NFR BLD: 22.7 % (ref 18–48)
MCH RBC QN AUTO: 29 PG (ref 27–31)
MCHC RBC AUTO-ENTMCNC: 32 G/DL (ref 32–36)
MCV RBC AUTO: 91 FL (ref 82–98)
MONOCYTES # BLD AUTO: 0.6 K/UL (ref 0.3–1)
MONOCYTES NFR BLD: 7.2 % (ref 4–15)
NEUTROPHILS # BLD AUTO: 5.2 K/UL (ref 1.8–7.7)
NEUTROPHILS NFR BLD: 63.1 % (ref 38–73)
NONHDLC SERPL-MCNC: 124 MG/DL
NRBC BLD-RTO: 0 /100 WBC
PLATELET # BLD AUTO: 175 K/UL (ref 150–450)
PMV BLD AUTO: 11.1 FL (ref 9.2–12.9)
POTASSIUM SERPL-SCNC: 4.1 MMOL/L (ref 3.5–5.1)
PROT SERPL-MCNC: 6.7 G/DL (ref 6–8.4)
RBC # BLD AUTO: 4.9 M/UL (ref 4.6–6.2)
SODIUM SERPL-SCNC: 140 MMOL/L (ref 136–145)
TRIGL SERPL-MCNC: 318 MG/DL (ref 30–150)
TSH SERPL DL<=0.005 MIU/L-ACNC: 3.27 UIU/ML (ref 0.4–4)
WBC # BLD AUTO: 8.22 K/UL (ref 3.9–12.7)

## 2023-04-24 PROCEDURE — 85025 COMPLETE CBC W/AUTO DIFF WBC: CPT | Performed by: FAMILY MEDICINE

## 2023-04-24 PROCEDURE — 84153 ASSAY OF PSA TOTAL: CPT | Performed by: FAMILY MEDICINE

## 2023-04-24 PROCEDURE — 82306 VITAMIN D 25 HYDROXY: CPT | Performed by: FAMILY MEDICINE

## 2023-04-24 PROCEDURE — 80053 COMPREHEN METABOLIC PANEL: CPT | Performed by: FAMILY MEDICINE

## 2023-04-24 PROCEDURE — 83036 HEMOGLOBIN GLYCOSYLATED A1C: CPT | Performed by: FAMILY MEDICINE

## 2023-04-24 PROCEDURE — 36415 COLL VENOUS BLD VENIPUNCTURE: CPT | Performed by: FAMILY MEDICINE

## 2023-04-24 PROCEDURE — 84443 ASSAY THYROID STIM HORMONE: CPT | Performed by: FAMILY MEDICINE

## 2023-04-24 PROCEDURE — 80061 LIPID PANEL: CPT | Performed by: FAMILY MEDICINE

## 2023-04-25 ENCOUNTER — CLINICAL SUPPORT (OUTPATIENT)
Dept: SMOKING CESSATION | Facility: CLINIC | Age: 68
End: 2023-04-25
Payer: COMMERCIAL

## 2023-04-25 DIAGNOSIS — F17.200 NICOTINE DEPENDENCE: Primary | ICD-10-CM

## 2023-04-25 PROCEDURE — 99999 PR PBB SHADOW E&M-EST. PATIENT-LVL II: CPT | Mod: PBBFAC,,,

## 2023-04-25 PROCEDURE — 90853 GROUP PSYCHOTHERAPY: CPT | Mod: S$GLB,,,

## 2023-04-25 PROCEDURE — 90853 PR GROUP PSYCHOTHERAPY: ICD-10-PCS | Mod: S$GLB,,,

## 2023-04-25 PROCEDURE — 99999 PR PBB SHADOW E&M-EST. PATIENT-LVL II: ICD-10-PCS | Mod: PBBFAC,,,

## 2023-04-25 RX ORDER — IBUPROFEN 200 MG
1 TABLET ORAL DAILY
Qty: 28 PATCH | Refills: 0 | Status: SHIPPED | OUTPATIENT
Start: 2023-04-25

## 2023-04-25 NOTE — PROGRESS NOTES
Site: [facility]  Date:  4/25/2023  Clinical Status of Patient: Outpatient   Length of Service and Code: 60 minutes - [id number]   Number in Attendance: 2  Group Activities/Focus of Group:  orientation, client introductions, completion of TCRS (Tobacco Cessation Rating Scale) learned addiction model, cues/triggers, personal reasons for quitting, medications, goals, quit date    Target symptoms:  withdrawal and medication side effects             The following were rated moderate (3) to severe (4) on TCRS:       Moderate 3: none     Severe 4:   none  Patient's Response to Intervention: Pt currently smoke 3 cigarettes per day;down from 5 cpds the previous session. Pt commended for the accomplishment thus far. Pt remain on prescribed tobacco cessation medication regimen of 21 mg nicotine patches and 2 mg without any negative side effects at this time. Reviewed triggers, stress management, and habitual behavior. Nicotine patches decreased to 14 mg during today's session. Pt agreed to attempt a dry run today. Pt denies any abnormal behavior or mental changes at this time. Pt will continue with  therapy sessions and medication monitoring by CTTS. Prescribed medication management will be by physician. Today's CO measurement=4 ppm (0-6 ppm is a non smoker)    Diagnosis: Z72.0    Return to Clinic: 5 weeks

## 2023-04-25 NOTE — Clinical Note
Pt currently smoke 3 cigarettes per day;down from 5 cpds the previous session. Pt commended for the accomplishment thus far. Pt remain on prescribed tobacco cessation medication regimen of 21 mg nicotine patches and 2 mg without any negative side effects at this time. Reviewed triggers, stress management, and habitual behavior. Nicotine patches decreased to 14 mg during today's session. Pt agreed to attempt a dry run today. Pt denies any abnormal behavior or mental changes at this time. Pt will continue with  therapy sessions and medication monitoring by CTTS. Prescribed medication management will be by physician. Today's CO measurement=4 ppm (0-6 ppm is a non smoker)

## 2023-04-26 ENCOUNTER — OFFICE VISIT (OUTPATIENT)
Dept: FAMILY MEDICINE | Facility: CLINIC | Age: 68
End: 2023-04-26
Payer: COMMERCIAL

## 2023-04-26 ENCOUNTER — TELEPHONE (OUTPATIENT)
Dept: ADMINISTRATIVE | Facility: OTHER | Age: 68
End: 2023-04-26
Payer: MEDICARE

## 2023-04-26 VITALS
HEIGHT: 71 IN | SYSTOLIC BLOOD PRESSURE: 128 MMHG | WEIGHT: 220.25 LBS | DIASTOLIC BLOOD PRESSURE: 64 MMHG | OXYGEN SATURATION: 97 % | HEART RATE: 65 BPM | BODY MASS INDEX: 30.83 KG/M2

## 2023-04-26 DIAGNOSIS — F41.9 ANXIETY: ICD-10-CM

## 2023-04-26 DIAGNOSIS — Z00.00 ROUTINE GENERAL MEDICAL EXAMINATION AT A HEALTH CARE FACILITY: Primary | ICD-10-CM

## 2023-04-26 DIAGNOSIS — Z79.82 LONG-TERM USE OF ASPIRIN THERAPY: ICD-10-CM

## 2023-04-26 DIAGNOSIS — Z13.6 ENCOUNTER FOR ABDOMINAL AORTIC ANEURYSM SCREENING: ICD-10-CM

## 2023-04-26 DIAGNOSIS — Z23 NEED FOR PNEUMOCOCCAL 20-VALENT CONJUGATE VACCINATION: ICD-10-CM

## 2023-04-26 DIAGNOSIS — I87.2 VENOUS INSUFFICIENCY OF LOWER EXTREMITY, UNSPECIFIED LATERALITY: ICD-10-CM

## 2023-04-26 DIAGNOSIS — Z79.899 MEDICATION MANAGEMENT: ICD-10-CM

## 2023-04-26 DIAGNOSIS — Z12.11 COLON CANCER SCREENING: ICD-10-CM

## 2023-04-26 DIAGNOSIS — G47.33 OSA ON CPAP: ICD-10-CM

## 2023-04-26 DIAGNOSIS — F33.1 MAJOR DEPRESSIVE DISORDER, RECURRENT, MODERATE: ICD-10-CM

## 2023-04-26 DIAGNOSIS — E66.09 CLASS 1 OBESITY DUE TO EXCESS CALORIES WITH SERIOUS COMORBIDITY AND BODY MASS INDEX (BMI) OF 30.0 TO 30.9 IN ADULT: ICD-10-CM

## 2023-04-26 DIAGNOSIS — Z72.0 TOBACCO ABUSE: ICD-10-CM

## 2023-04-26 DIAGNOSIS — E78.2 COMBINED HYPERLIPIDEMIA: ICD-10-CM

## 2023-04-26 DIAGNOSIS — R73.03 PREDIABETES: ICD-10-CM

## 2023-04-26 DIAGNOSIS — E55.9 VITAMIN D DEFICIENCY: ICD-10-CM

## 2023-04-26 PROCEDURE — 90677 PCV20 VACCINE IM: CPT | Mod: PBBFAC,PO

## 2023-04-26 PROCEDURE — 90473 IMMUNE ADMIN ORAL/NASAL: CPT | Mod: PBBFAC,PO | Performed by: FAMILY MEDICINE

## 2023-04-26 PROCEDURE — 99215 OFFICE O/P EST HI 40 MIN: CPT | Mod: PBBFAC,PO,25 | Performed by: FAMILY MEDICINE

## 2023-04-26 PROCEDURE — 90680 RV5 VACC 3 DOSE LIVE ORAL: CPT | Mod: PBBFAC,PO | Performed by: FAMILY MEDICINE

## 2023-04-26 PROCEDURE — 99999 PR PBB SHADOW E&M-EST. PATIENT-LVL V: ICD-10-PCS | Mod: PBBFAC,,, | Performed by: FAMILY MEDICINE

## 2023-04-26 PROCEDURE — 99397 PER PM REEVAL EST PAT 65+ YR: CPT | Mod: 25,S$GLB,, | Performed by: FAMILY MEDICINE

## 2023-04-26 PROCEDURE — 99999 PR PBB SHADOW E&M-EST. PATIENT-LVL V: CPT | Mod: PBBFAC,,, | Performed by: FAMILY MEDICINE

## 2023-04-26 PROCEDURE — 99397 PR PREVENTIVE VISIT,EST,65 & OVER: ICD-10-PCS | Mod: 25,S$GLB,, | Performed by: FAMILY MEDICINE

## 2023-04-26 NOTE — PROGRESS NOTES
Office Visit    Patient Name: Stef Pool    : 1955  MRN: [id number]    Subjective:  Stef is a 67 y.o. male who presents today for:    Annual Exam    Most recent visit with me 10/26/2022   ENT 3/30/23- DISE/ BARAK consult    68 yo male here for annual physical  with monitoring of chronic conditions including obesity, prediabetes, BARAK on CPAP, hyperlipidemia (with ongoing triglyceride elevation and low HDL), history of tobacco abuse, anxiety and depression (Zoloft 100, Wellbutrin 300XL, and Xanax 0.5 prn).     Seen by Dr Morris 21 re: venous insufficiency/varicose veins:   Compression stockings 20-30 mmHg  Exercise as tolerated    US BLE  22:   Impression:   1. Hemodynamically significant venous reflux within the left GSV and popliteal veins as well as involving bilateral accessory calf veins.   2. There is no evidence of bilateral lower extremity deep venous thrombosis.     He retired from [employer] 2021.    His daughter had a baby boy 2021-- he and his wife have helped with childcare.   He enjoyed an [employer] 2022 and has an upcoming national park trip planned with his wife and daughter.      Labs 23:  Lipids with LDL of 60 with triglycerides with elevation to 318, A1c of 5.6, normal vitamin-D, TSH, CBC.  Metformin 500 mg XR BID. Vitamin D 58 on 50,000 IU D2 Q7days PLUS 2,000 IU D3 DAILY.       He has been feeling over all well.   General mood outlook is good-- helped with daily zoloft +Wellbutrin. Takes prn xanax for anxiety.   Bladder emptying stable on Flomax.   Smoking Cessation Update: was smoke free since 21, BUT HE STARTED SMOKING AGAIN IN 2021. Working extensively with smoking cessation-down to smoking about 3 cigarettes daily.   Eating  candy to to have something in his mouth.      GENERAL LIFESTYLE:   Diet: FAIR-- drinking fair amount of water and cut back to 2 root beers and a milk daily, limiting sugar in coffee, but he could do better meal  prepping to incorporate more vegetables, though better about eating at home.   Exercise: walking about 3 days per week (used to do 3 miles per day) and some bike riding   Sleep: good-- 7 hours nightly-- now using CPAP EVERY NIGHT, gets up to go to the bathroom once on average nightly, having trouble with adequate BARAK control and considering DISE with ENT with Inspire device.   Weight: UP about 10 lbs in the last 6 months-- BMI 30.         Screening tests: colonoscopy 7/12/19-- repeat 3 years-ORDERED, PSA 3.6 4/24/23, hep C neg 11/20/2015, HIV (-) 8/7/20     Immunizations: TDaP 5/2/2013, FLU shot 10/26/22, SHINGRIX Completed 10/16/20, Pneumovax 23 1/31/2012 (when he was smoking), COVID-19 VACCINE COMPLETED W/ MODERNA BOOSTER 12/22/21- Bivalent booster advised   PREVNAR 20 today 4/26/23    Eye/Dental: eye [facility] 1/6/23- RTC one year, dental UTD    PAST MEDICAL HISTORY, SURGICAL/SOCIAL/FAMILY HISTORY REVIEWED AS PER CHART, WITH PERTINENT FINDINGS INCLUDED IN HISTORY SECTION OF NOTE.     Current Medications    Medication List with Changes/Refills   Current Medications    ALPRAZOLAM (XANAX) 0.5 MG TABLET    Take 1 tablet (0.5 mg total) by mouth every evening.    ASPIRIN 81 MG CHEW    Take 1 tablet (81 mg total) by mouth once daily.    ATORVASTATIN (LIPITOR) 40 MG TABLET    TAKE 1 TABLET BY MOUTH EVERY DAY    BUPROPION (WELLBUTRIN XL) 300 MG 24 HR TABLET    Take 1 tablet (300 mg total) by mouth once daily.    CHOLECALCIFEROL, VITAMIN D3, (VITAMIN D3) 50 MCG (2,000 UNIT) CAP CAPSULE    TAKE 1 CAPSULE (2,000 UNITS TOTAL) BY MOUTH ONCE DAILY.    ERGOCALCIFEROL (ERGOCALCIFEROL) 50,000 UNIT CAP    TAKE 1 CAPSULE BY MOUTH ONE TIME PER WEEK    FLUTICASONE PROPIONATE (FLONASE) 50 MCG/ACTUATION NASAL SPRAY    INSTILL 2 SPRAYS (100 MCG TOTAL) BY EACH NOSTRIL ROUTE ONCE DAILY.    METFORMIN (GLUCOPHAGE-XR) 500 MG ER 24HR TABLET    TAKE 1 TABLET BY MOUTH TWICE A DAY WITH MEALS    NICOTINE (NICODERM CQ) 14 MG/24 HR    Place 1 patch  "onto the skin once daily.    NICOTINE POLACRILEX 2 MG LOZG    Take 1 lozenge (2 mg total) by mouth as needed (1-2 per hour in the place of cigarette (5-10 daily max) (mini).    SERTRALINE (ZOLOFT) 100 MG TABLET    TAKE 1 TABLET BY MOUTH EVERY DAY    SILDENAFIL (VIAGRA) 100 MG TABLET    Take 1 tablet (100 mg total) by mouth daily as needed for Erectile Dysfunction.    TAMSULOSIN (FLOMAX) 0.4 MG CAP    TAKE TWO CAPSULES BY MOUTH DAILY IN THE EVENING.       Allergies   Review of patient's allergies indicates:  No Known Allergies      Review of Systems (Pertinent positives)  Review of Systems   Constitutional:  Positive for fatigue. Negative for chills, fever and unexpected weight change.   HENT:  Negative for trouble swallowing.    Eyes:  Negative for visual disturbance.   Respiratory:  Negative for shortness of breath.    Cardiovascular:  Negative for chest pain and leg swelling.   Gastrointestinal:  Negative for blood in stool, constipation and diarrhea.   Genitourinary:  Negative for difficulty urinating, dysuria and hematuria.   Skin:  Negative for wound.   Neurological:  Negative for dizziness and syncope.   Psychiatric/Behavioral:  Negative for dysphoric mood and sleep disturbance. The patient is not nervous/anxious.      /64 (BP Location: Left arm, Patient Position: Sitting, BP Method: Medium (Manual))   Pulse 65   Ht 5' 11" (1.803 m)   Wt 99.9 kg (220 lb 3.8 oz)   SpO2 97%   BMI 30.72 kg/m²     Physical Exam  Vitals reviewed.   Constitutional:       General: He is not in acute distress.  HENT:      Head: Normocephalic and atraumatic.      Right Ear: External ear normal.      Left Ear: External ear normal.      Nose: Nose normal.      Mouth/Throat:      Pharynx: No oropharyngeal exudate.   Eyes:      General: No scleral icterus.     Conjunctiva/sclera: Conjunctivae normal.   Cardiovascular:      Rate and Rhythm: Normal rate and regular rhythm.      Heart sounds: Normal heart sounds.   Pulmonary:      " Effort: Pulmonary effort is normal.      Breath sounds: Normal breath sounds.   Abdominal:      General: Bowel sounds are normal. There is no distension.      Palpations: Abdomen is soft.      Tenderness: There is no abdominal tenderness.   Musculoskeletal:         General: Normal range of motion.      Right lower leg: No edema.      Left lower leg: No edema.   Lymphadenopathy:      Cervical: No cervical adenopathy.   Skin:     General: Skin is warm and dry.      Findings: No rash.   Neurological:      General: No focal deficit present.      Mental Status: He is alert and oriented to person, place, and time.   Psychiatric:         Mood and Affect: Mood normal.         Behavior: Behavior normal.         Assessment/Plan:  Stef Pool is a 67 y.o. male who presents today for :        ICD-10-CM ICD-9-CM    1. Routine general medical examination at a health care facility  Z00.00 V70.0       2. Class 1 obesity due to excess calories with serious comorbidity and body mass index (BMI) of 30.0 to 30.9 in adult  E66.09 278.00     Z68.30 V85.30       3. Combined hyperlipidemia  E78.2 272.2 Hemoglobin A1C      Lipid Panel      Comprehensive Metabolic Panel      Vitamin B12      Magnesium      4. Prediabetes  R73.03 790.29 Hemoglobin A1C      Lipid Panel      Comprehensive Metabolic Panel      Vitamin B12      Magnesium      5. BARAK on CPAP  G47.33 327.23     Z99.89 V46.8       6. Tobacco abuse  Z72.0 305.1       7. Venous insufficiency of lower extremity, unspecified laterality  I87.2 459.81       8. Anxiety  F41.9 300.00       9. Major depressive disorder, recurrent, moderate  F33.1 296.32       10. Long-term use of aspirin therapy  Z79.82 V58.66       11. Medication management  Z79.899 V58.69 Hemoglobin A1C      Lipid Panel      Comprehensive Metabolic Panel      Vitamin B12      Magnesium      12. Vitamin D deficiency  E55.9 268.9       13. Need for pneumococcal 20-valent conjugate vaccination  Z23 V03.82 (In Office  Administered) Pneumococcal Conjugate Vaccine (20 Valent) (IM)      14. Encounter for abdominal aortic aneurysm screening  Z13.6 V81.2  Abdominal Aorta      15. Colon cancer screening  Z12.11 V76.51 Case Request Endoscopy: COLONOSCOPY           ADVISED ON DIET/EXERCISE/SLEEP, ROUTINE EYE/DENTAL EXAMS, AND THE IMPORTANCE OF KEEPING UP WITH APPROPRIATE SCREENING TESTS BASED ON AGE AND RISK FACTORS.  COVID-19 VACCINE COMPLETED W/ MODERNA BOOSTER 12/22/21-- BIVALENT BOOSTER ADVISED, COLONOSCOPY ORDERED &  PREVNAR 20 GIVEN     OBESITY/OVERWEIGHT BMI:  Counseled on diet/exercise habits and the importance of continued focus on weight loss. Plans to continue walking/bike riding.  Needs to cut back on soft drinks & candy.  If continued triglyceride increase may advise statin increased fevers increased metformin.     PREDIABETES: A1c controlled on metformin  mg b.i.d and with lifestyle improvements. Continue current regimen and recheck 6 months.      HLD:  LDL at goal of less than 100 on Lipitor 40, Continue diet/exercise/weight loss efforts especially given triglyceride elevation and Recheck in 6 months     DEPRESSION/ANXIETY: Anxiety stable on Zoloft 100 mg daily with 1 low-dose Xanax daily.  Notes mood improvement with Adding Wellbutrin 300 XL daily but this has not really helped with smoking cessation.     SMOKER:  Unfortunately after quitting smoking for about 18 months he resumed smoking again around October 2020.  Working diligently with smoking cessation, continue Wellbutrin 300 XL, prescription for nicotine replacement patches.Lozenges,-- has been weaning down/cutting back significantly on with nicotine replacement     VITAMIN-D DEFICIENCY:  Level now normal on combination of weekly 24155 IU D2 with daily 2000 IU D3.  Yearly monitoring     BILATERAL DISTENDED PAINFUL VARICOSE VEINS: following with Vascular Dr Morris placed, wearing compression stockings, ultrasound showed venous reflux of the left lower  extremity, doing okay with conservative management for now but can follow-up with Dr. Morris  needed.      H/O COLONIC POLYPS:  Due for repeat colonoscopy as of 07/2022, repeat order entered     BPH:  symptoms controlled on Flomax 0.8 mg nightly, consider urology referral if worsening.  PSA yearly-- mild increase--- will monitor     Patient Instructions   An order for a colonoscopy has been placed. If you have not been contacted within 5-7 days to schedule this procedure, please call GI scheduling at [phone]--[facility] colonoscopy.           Follow up in about 6 months (around 10/26/2023) for to follow up on lab results, return as needed for new concerns.

## 2023-04-26 NOTE — PATIENT INSTRUCTIONS
An order for a colonoscopy has been placed. If you have not been contacted within 5-7 days to schedule this procedure, please call GI scheduling at [phone]--[facility] colonoscopy.

## 2023-04-27 ENCOUNTER — TELEPHONE (OUTPATIENT)
Dept: FAMILY MEDICINE | Facility: CLINIC | Age: 68
End: 2023-04-27
Payer: MEDICARE

## 2023-06-09 ENCOUNTER — HOSPITAL ENCOUNTER (OUTPATIENT)
Dept: PREADMISSION TESTING | Facility: HOSPITAL | Age: 68
Discharge: HOME | End: 2023-06-09
Attending: OTOLARYNGOLOGY
Payer: MEDICARE

## 2023-06-09 ENCOUNTER — HOSPITAL ENCOUNTER (OUTPATIENT)
Dept: PREADMISSION TESTING | Facility: HOSPITAL | Age: 68
Discharge: HOME | End: 2023-06-09
Attending: OTOLARYNGOLOGY
Payer: COMMERCIAL

## 2023-06-09 ENCOUNTER — HOSPITAL ENCOUNTER (OUTPATIENT)
Dept: RADIOLOGY | Facility: HOSPITAL | Age: 68
Discharge: HOME | End: 2023-06-09
Attending: OTOLARYNGOLOGY
Payer: MEDICARE

## 2023-06-09 ENCOUNTER — ANESTHESIA EVENT (OUTPATIENT)
Dept: SURGERY | Facility: HOSPITAL | Age: 68
End: 2023-06-09
Payer: MEDICARE

## 2023-06-09 VITALS
TEMPERATURE: 98 F | RESPIRATION RATE: 18 BRPM | SYSTOLIC BLOOD PRESSURE: 126 MMHG | BODY MASS INDEX: 31.28 KG/M2 | WEIGHT: 218.5 LBS | DIASTOLIC BLOOD PRESSURE: 77 MMHG | HEART RATE: 55 BPM | HEIGHT: 70 IN | OXYGEN SATURATION: 96 %

## 2023-06-09 DIAGNOSIS — Z01.818 PREOP EXAMINATION: ICD-10-CM

## 2023-06-09 DIAGNOSIS — Z01.818 PREOP TESTING: Primary | ICD-10-CM

## 2023-06-09 DIAGNOSIS — Z78.9 INTOLERANCE OF CONTINUOUS POSITIVE AIRWAY PRESSURE (CPAP) VENTILATION: ICD-10-CM

## 2023-06-09 DIAGNOSIS — G47.33 OSA ON CPAP: ICD-10-CM

## 2023-06-09 LAB
ALBUMIN SERPL BCP-MCNC: 4 G/DL (ref 3.5–5.2)
ALP SERPL-CCNC: 124 U/L (ref 55–135)
ALT SERPL W/O P-5'-P-CCNC: 23 U/L (ref 10–44)
ANION GAP SERPL CALC-SCNC: 8 MMOL/L (ref 8–16)
AST SERPL-CCNC: 20 U/L (ref 10–40)
BASOPHILS # BLD AUTO: 0.04 K/UL (ref 0–0.2)
BASOPHILS NFR BLD: 0.4 % (ref 0–1.9)
BILIRUB SERPL-MCNC: 0.4 MG/DL (ref 0.1–1)
BUN SERPL-MCNC: 15 MG/DL (ref 8–23)
CALCIUM SERPL-MCNC: 9.6 MG/DL (ref 8.7–10.5)
CHLORIDE SERPL-SCNC: 107 MMOL/L (ref 95–110)
CO2 SERPL-SCNC: 24 MMOL/L (ref 23–29)
CREAT SERPL-MCNC: 1 MG/DL (ref 0.5–1.4)
DIFFERENTIAL METHOD: NORMAL
EOSINOPHIL # BLD AUTO: 0.4 K/UL (ref 0–0.5)
EOSINOPHIL NFR BLD: 4.7 % (ref 0–8)
ERYTHROCYTE [DISTWIDTH] IN BLOOD BY AUTOMATED COUNT: 13.2 % (ref 11.5–14.5)
EST. GFR  (NO RACE VARIABLE): >60 ML/MIN/1.73 M^2
GLUCOSE SERPL-MCNC: 72 MG/DL (ref 70–110)
HCT VFR BLD AUTO: 49.5 % (ref 40–54)
HGB BLD-MCNC: 16.2 G/DL (ref 14–18)
IMM GRANULOCYTES # BLD AUTO: 0.03 K/UL (ref 0–0.04)
IMM GRANULOCYTES NFR BLD AUTO: 0.3 % (ref 0–0.5)
LYMPHOCYTES # BLD AUTO: 2.2 K/UL (ref 1–4.8)
LYMPHOCYTES NFR BLD: 24.3 % (ref 18–48)
MCH RBC QN AUTO: 29.5 PG (ref 27–31)
MCHC RBC AUTO-ENTMCNC: 32.7 G/DL (ref 32–36)
MCV RBC AUTO: 90 FL (ref 82–98)
MONOCYTES # BLD AUTO: 0.6 K/UL (ref 0.3–1)
MONOCYTES NFR BLD: 6.7 % (ref 4–15)
NEUTROPHILS # BLD AUTO: 5.7 K/UL (ref 1.8–7.7)
NEUTROPHILS NFR BLD: 63.6 % (ref 38–73)
NRBC BLD-RTO: 0 /100 WBC
PLATELET # BLD AUTO: 201 K/UL (ref 150–450)
PMV BLD AUTO: 11.6 FL (ref 9.2–12.9)
POTASSIUM SERPL-SCNC: 4.3 MMOL/L (ref 3.5–5.1)
PROT SERPL-MCNC: 6.9 G/DL (ref 6–8.4)
RBC # BLD AUTO: 5.49 M/UL (ref 4.6–6.2)
SODIUM SERPL-SCNC: 139 MMOL/L (ref 136–145)
WBC # BLD AUTO: 8.93 K/UL (ref 3.9–12.7)

## 2023-06-09 PROCEDURE — 71046 XR CHEST PA AND LATERAL PRE-OP: ICD-10-PCS | Mod: 26,,, | Performed by: RADIOLOGY

## 2023-06-09 PROCEDURE — 93010 EKG 12-LEAD: ICD-10-PCS | Mod: ,,, | Performed by: INTERNAL MEDICINE

## 2023-06-09 PROCEDURE — 71046 X-RAY EXAM CHEST 2 VIEWS: CPT | Mod: TC,FY

## 2023-06-09 PROCEDURE — 36415 COLL VENOUS BLD VENIPUNCTURE: CPT | Performed by: OTOLARYNGOLOGY

## 2023-06-09 PROCEDURE — 93005 ELECTROCARDIOGRAM TRACING: CPT

## 2023-06-09 PROCEDURE — 80053 COMPREHEN METABOLIC PANEL: CPT | Performed by: OTOLARYNGOLOGY

## 2023-06-09 PROCEDURE — 85025 COMPLETE CBC W/AUTO DIFF WBC: CPT | Performed by: OTOLARYNGOLOGY

## 2023-06-09 PROCEDURE — 93010 ELECTROCARDIOGRAM REPORT: CPT | Mod: ,,, | Performed by: INTERNAL MEDICINE

## 2023-06-09 PROCEDURE — 71046 X-RAY EXAM CHEST 2 VIEWS: CPT | Mod: 26,,, | Performed by: RADIOLOGY

## 2023-06-09 NOTE — ANESTHESIA PREPROCEDURE EVALUATION
06/09/2023  Pre-operative evaluation for Procedure(s) (LRB):  SLEEP ENDOSCOPY,DRUG-INDUCED (N/A)    Stef Pool is a 67 y.o. male     Patient Active Problem List   Diagnosis    Carpal tunnel syndrome on both sides    Combined hyperlipidemia    Major depressive disorder, recurrent, moderate    Medication management    Long-term use of aspirin therapy    BARAK on CPAP    Class 1 obesity due to excess calories with serious comorbidity and body mass index (BMI) of 30.0 to 30.9 in adult    Anxiety    Prediabetes    Tobacco abuse counseling    Nuclear sclerosis, bilateral    Vitamin D deficiency    History of colon polyps    Varicose veins of leg with pain, bilateral    Venous insufficiency of lower extremity    Tobacco abuse    [place] cardiac risk >20% in next 10 years       Review of patient's allergies indicates:  No Known Allergies    Current Facility-Administered Medications on File Prior to Encounter   Medication Dose Route Frequency Provider Last Rate Last Admin    sodium chloride 0.9% flush 10 mL  10 mL Intravenous PRN Ann Gross MD         Current Outpatient Medications on File Prior to Encounter   Medication Sig Dispense Refill    aspirin 81 MG Chew Take 1 tablet (81 mg total) by mouth once daily.      atorvastatin (LIPITOR) 40 MG tablet TAKE 1 TABLET BY MOUTH EVERY DAY 90 tablet 3    buPROPion (WELLBUTRIN XL) 300 MG 24 hr tablet Take 1 tablet (300 mg total) by mouth once daily. 90 tablet 4    cholecalciferol, vitamin D3, (VITAMIN D3) 50 mcg (2,000 unit) Cap capsule TAKE 1 CAPSULE (2,000 UNITS TOTAL) BY MOUTH ONCE DAILY. 90 capsule 3    ergocalciferol (ERGOCALCIFEROL) 50,000 unit Cap TAKE 1 CAPSULE BY MOUTH ONE TIME PER WEEK 12 capsule 4    fluticasone propionate (FLONASE) 50 mcg/actuation nasal spray INSTILL 2 SPRAYS (100 MCG TOTAL) BY EACH NOSTRIL ROUTE ONCE  DAILY. 48 mL 2    metFORMIN (GLUCOPHAGE-XR) 500 MG ER 24hr tablet TAKE 1 TABLET BY MOUTH TWICE A DAY WITH MEALS 180 tablet 1    nicotine polacrilex 2 MG Lozg Take 1 lozenge (2 mg total) by mouth as needed (1-2 per hour in the place of cigarette (5-10 daily max) (mini). 144 lozenge 0    sertraline (ZOLOFT) 100 MG tablet TAKE 1 TABLET BY MOUTH EVERY DAY 90 tablet 2    sildenafiL (VIAGRA) 100 MG tablet Take 1 tablet (100 mg total) by mouth daily as needed for Erectile Dysfunction. 9 tablet 11    tamsulosin (FLOMAX) 0.4 mg Cap TAKE TWO CAPSULES BY MOUTH DAILY IN THE EVENING. 180 capsule 2       Past Surgical History:   Procedure Laterality Date    COLONOSCOPY N/A 7/12/2019    Procedure: COLONOSCOPY;  Surgeon: Amrit Reyes MD;  Location: [facility];  Service: Endoscopy;  Laterality: N/A;       Pre-op Assessment    I have reviewed the Patient Summary Reports.     I have reviewed the Nursing Notes. I have reviewed the NPO Status.   I have reviewed the Medications.     Review of Systems  Anesthesia Hx:  No problems with previous Anesthesia  Denies Family Hx of Anesthesia complications.   Denies Personal Hx of Anesthesia complications.   Social:  No Alcohol Use, Smoker    Hematology/Oncology:  Hematology Normal   Oncology Normal     EENT/Dental:EENT/Dental Normal   Cardiovascular:   Exercise tolerance: good Denies Hypertension.  hyperlipidemia  Functional Capacity good / => 4 METS    Pulmonary:   Sleep Apnea, CPAP    Renal/:  Renal/ Normal     Hepatic/GI:  Hepatic/GI Normal    Musculoskeletal:  Musculoskeletal Normal    Neurological:   Neuromuscular Disease,    Endocrine:   Diabetes    Dermatological:  Skin Normal    Psych:   depression          Physical Exam  General: Well nourished    Airway:  Mallampati: II   Mouth Opening: Normal  Tongue: Normal  Neck ROM: Normal ROM    Chest/Lungs:  Clear to auscultation    Heart:  Rate: Normal  Rhythm: Regular Rhythm  Sounds: Normal        Anesthesia Plan  Type of  Anesthesia, risks & benefits discussed:    Anesthesia Type: Gen Natural Airway  Intra-op Monitoring Plan: Standard ASA Monitors  Post Op Pain Control Plan: multimodal analgesia  Induction:  IV  Informed Consent: Patient consented to blood products? Yes  ASA Score: 3    Ready For Surgery From Anesthesia Perspective.     .

## 2023-06-09 NOTE — DISCHARGE INSTRUCTIONS

## 2023-06-12 NOTE — PRE ADMISSION SCREENING
Dr Gross notified that H/P is needed.  
This is a 25 year-old woman with PMHx hypothyroidism, asthma, GERD, seizures, cerebral palsy, scoliosis, s/p trach/peg, and developmental delay BIB mother for bradycardia. At baseline, patient is nonverbal but follows minimal commands. Mother reports that for the last few weeks patient has been having different types of seizures from her usual focal seizures. She has been having head jerks, often at night but sometimes during the day as well. Saw their neurologist, keppra level was noted to be low so increased keppra dose. Patient was briefly on dilantin but was not responding so was taken off it. Pt's last seizure was one week ago. Additionally, patient started having episodes of bradycardia to the 40s-50s around that time. Patient was supposed to be placed on a holter monitor but due to insurance reasons was not able to yet. Patient had one episode of diarrhea today. No fevers, skin changes, rashes. An infectious workup was done outpatient recently and found to have sputum culture growing pseudomonas; patient treated with a 10 day course of levaquin which she completed a few days ago. Patient has had multiple PICU admissions for infections, often UTI and PNA. Additionally, patient was hospitalized in 1/2020 at [facility] for Coxsackie c/b pericarditis, for which she takes colchicine.     In the ED, pt's vitals were T 91.1 F HR 50s-60s BP 85/44 (pt's baseline) RR 15 POX 98% on vent. Pt treated with NS 1L IVB and levaquin 750 mg IV.

## 2023-06-13 ENCOUNTER — HOSPITAL ENCOUNTER (OUTPATIENT)
Facility: HOSPITAL | Age: 68
Discharge: HOME | End: 2023-06-13
Attending: OTOLARYNGOLOGY | Admitting: OTOLARYNGOLOGY
Payer: MEDICARE

## 2023-06-13 ENCOUNTER — ANESTHESIA (OUTPATIENT)
Dept: SURGERY | Facility: HOSPITAL | Age: 68
End: 2023-06-13
Payer: MEDICARE

## 2023-06-13 VITALS
HEART RATE: 57 BPM | DIASTOLIC BLOOD PRESSURE: 69 MMHG | RESPIRATION RATE: 16 BRPM | BODY MASS INDEX: 31.34 KG/M2 | SYSTOLIC BLOOD PRESSURE: 116 MMHG | WEIGHT: 218.44 LBS | TEMPERATURE: 98 F | OXYGEN SATURATION: 99 %

## 2023-06-13 DIAGNOSIS — G47.33 OSA ON CPAP: ICD-10-CM

## 2023-06-13 DIAGNOSIS — Z78.9 INTOLERANCE OF CONTINUOUS POSITIVE AIRWAY PRESSURE (CPAP) VENTILATION: ICD-10-CM

## 2023-06-13 DIAGNOSIS — Z01.818 PREOPERATIVE TESTING: Primary | ICD-10-CM

## 2023-06-13 PROCEDURE — D9220A PRA ANESTHESIA: ICD-10-PCS | Mod: ANES,,, | Performed by: ANESTHESIOLOGY

## 2023-06-13 PROCEDURE — 36000708 HC OR TIME LEV III 1ST 15 MIN: Performed by: OTOLARYNGOLOGY

## 2023-06-13 PROCEDURE — 37000009 HC ANESTHESIA EA ADD 15 MINS: Performed by: OTOLARYNGOLOGY

## 2023-06-13 PROCEDURE — 36000709 HC OR TIME LEV III EA ADD 15 MIN: Performed by: OTOLARYNGOLOGY

## 2023-06-13 PROCEDURE — D9220A PRA ANESTHESIA: ICD-10-PCS | Mod: CRNA,,, | Performed by: NURSE ANESTHETIST, CERTIFIED REGISTERED

## 2023-06-13 PROCEDURE — 37000008 HC ANESTHESIA 1ST 15 MINUTES: Performed by: OTOLARYNGOLOGY

## 2023-06-13 PROCEDURE — 42975 DISE EVAL SLP DO BRTH FLX DX: CPT | Mod: ,,, | Performed by: OTOLARYNGOLOGY

## 2023-06-13 PROCEDURE — 42975 PR SLEEP ENDOSCOPY, DRUG-INDUCED, W/EVAL OF SLEEP DISORD BREATH: ICD-10-PCS | Mod: ,,, | Performed by: OTOLARYNGOLOGY

## 2023-06-13 PROCEDURE — D9220A PRA ANESTHESIA: Mod: ANES,,, | Performed by: ANESTHESIOLOGY

## 2023-06-13 PROCEDURE — 25000003 PHARM REV CODE 250: Performed by: NURSE ANESTHETIST, CERTIFIED REGISTERED

## 2023-06-13 PROCEDURE — 71000015 HC POSTOP RECOV 1ST HR: Performed by: OTOLARYNGOLOGY

## 2023-06-13 PROCEDURE — 25000003 PHARM REV CODE 250: Performed by: OTOLARYNGOLOGY

## 2023-06-13 PROCEDURE — 63600175 PHARM REV CODE 636 W HCPCS: Performed by: ANESTHESIOLOGY

## 2023-06-13 PROCEDURE — 82962 GLUCOSE BLOOD TEST: CPT | Performed by: OTOLARYNGOLOGY

## 2023-06-13 PROCEDURE — D9220A PRA ANESTHESIA: Mod: CRNA,,, | Performed by: NURSE ANESTHETIST, CERTIFIED REGISTERED

## 2023-06-13 PROCEDURE — 71000016 HC POSTOP RECOV ADDL HR: Performed by: OTOLARYNGOLOGY

## 2023-06-13 PROCEDURE — 63600175 PHARM REV CODE 636 W HCPCS: Performed by: NURSE ANESTHETIST, CERTIFIED REGISTERED

## 2023-06-13 PROCEDURE — 71000033 HC RECOVERY, INTIAL HOUR: Performed by: OTOLARYNGOLOGY

## 2023-06-13 RX ORDER — OXYMETAZOLINE HCL 0.05 %
SPRAY, NON-AEROSOL (ML) NASAL
Status: DISCONTINUED | OUTPATIENT
Start: 2023-06-13 | End: 2023-06-13 | Stop reason: HOSPADM

## 2023-06-13 RX ORDER — LIDOCAINE HYDROCHLORIDE 10 MG/ML
1 INJECTION, SOLUTION EPIDURAL; INFILTRATION; INTRACAUDAL; PERINEURAL ONCE
Status: DISCONTINUED | OUTPATIENT
Start: 2023-06-13 | End: 2023-06-13 | Stop reason: SDUPTHER

## 2023-06-13 RX ORDER — PROPOFOL 10 MG/ML
VIAL (ML) INTRAVENOUS CONTINUOUS PRN
Status: DISCONTINUED | OUTPATIENT
Start: 2023-06-13 | End: 2023-06-13

## 2023-06-13 RX ORDER — SODIUM CHLORIDE, SODIUM LACTATE, POTASSIUM CHLORIDE, CALCIUM CHLORIDE 600; 310; 30; 20 MG/100ML; MG/100ML; MG/100ML; MG/100ML
INJECTION, SOLUTION INTRAVENOUS CONTINUOUS
Status: DISCONTINUED | OUTPATIENT
Start: 2023-06-13 | End: 2023-06-13 | Stop reason: HOSPADM

## 2023-06-13 RX ORDER — LIDOCAINE HYDROCHLORIDE 20 MG/ML
INJECTION INTRAVENOUS
Status: DISCONTINUED | OUTPATIENT
Start: 2023-06-13 | End: 2023-06-13

## 2023-06-13 RX ORDER — LIDOCAINE HYDROCHLORIDE 10 MG/ML
1 INJECTION, SOLUTION EPIDURAL; INFILTRATION; INTRACAUDAL; PERINEURAL ONCE
Status: DISCONTINUED | OUTPATIENT
Start: 2023-06-13 | End: 2023-06-13 | Stop reason: HOSPADM

## 2023-06-13 RX ADMIN — LIDOCAINE HYDROCHLORIDE 50 MG: 20 INJECTION, SOLUTION INTRAVENOUS at 07:06

## 2023-06-13 RX ADMIN — SODIUM CHLORIDE, SODIUM LACTATE, POTASSIUM CHLORIDE, AND CALCIUM CHLORIDE: .6; .31; .03; .02 INJECTION, SOLUTION INTRAVENOUS at 07:06

## 2023-06-13 RX ADMIN — PROPOFOL 100 MCG/KG/MIN: 10 INJECTION, EMULSION INTRAVENOUS at 07:06

## 2023-06-13 NOTE — OP NOTE
[facility] - Surgery  Surgery Department  Operative Note    SUMMARY     Date of Procedure: 6/13/2023     Procedure: Procedure(s) (LRB):  SLEEP ENDOSCOPY,DRUG-INDUCED (N/A)   (evaluation of sleep disordered breathing by examination of upper airway using an endoscope- CPT 64495).     Surgeon(s) and Role:     * Ann Gross MD - Primary    Assisting Surgeon: None    Pre-Operative Diagnosis: BARAK on CPAP [G47.33, Z99.89]  Intolerance of continuous positive airway pressure (CPAP) ventilation [Z78.9]    Post-Operative Diagnosis: Post-Op Diagnosis Codes:     * BARAK on CPAP [G47.33, Z99.89]     * Intolerance of continuous positive airway pressure (CPAP) ventilation [Z78.9]    Anesthesia: General    Operative Findings (including complications, if any):  a-p collapse, enlarged uvula. 45% side wall collapse but not concentric  Severely deviated septum and turbinate hypertrophy, nasal crusting  No epiglottic collapse. Good improvement with jaw thrust    Brief Clinical History: This is a patient with a history of moderate to severe symptomatic obstructive sleep apnea, who is intolerant and unable to achieve benefit with positive pressure therapy. The patient presents today for drug-induced sleep endoscopy to better characterize locations and pattern of obstruction and to predict appropriate medical and/or surgical options moving forward.     Description of Procedure: The patient was brought to the operating room and was anesthetized via the standard drug-induced sleep endoscopy protocol. The propofol infusion rate was started and gradually increased to a level at which point, conditions that mimic sleep were gradually observed.     With the patient not responsive to verbal commands, but still with spontaneous respiration, sleep disordered breathing events and associated desaturations were clearly observed. Under these conditions, the flexible endoscope was inserted to examine both sides of the nose as well as the pharynx and  larynx. The VOTE score at baseline was complete AP collapse.  With simulated jaw advancement and tongue advancement, the hypopharyngeal obstruction and secondarily the palatal collapse also improved. Additional findings as noted in operative findings section. The scope was removed and the patient was handed back over to anesthesia for awakening.I was present for and performed the entire procedure.       Significant Surgical Tasks Conducted by the Assistant(s), if Applicable: none    Estimated Blood Loss (EBL): 0           Implants: * No implants in log *    Specimens:   Specimen (24h ago, onward)      None                    Condition: Good    Disposition: PACU - hemodynamically stable.    Attestation: I was present and scrubbed for the entire procedure.

## 2023-06-13 NOTE — PLAN OF CARE
Pt prepared for procedure and verbalized understanding of pre and postop routines, all questions answered.

## 2023-06-13 NOTE — H&P
[facility]  Otorhinolaryngology-Head & Neck Surgery  History & Physical    Patient Name: Stef Pool  MRN: [id number]  Admission Date: 6/13/2023  Attending Physician: Ann Gross MD  Primary Care Provider: Caitlyn Fong MD      Subjective:     Chief Complaint/Reason for Admission: DISE procedure    History of Present Illness:  67 y.o. male presents with jonathon and intolerant to cpap here to today for dise    No current facility-administered medications on file prior to encounter.     Current Outpatient Medications on File Prior to Encounter   Medication Sig    atorvastatin (LIPITOR) 40 MG tablet TAKE 1 TABLET BY MOUTH EVERY DAY    buPROPion (WELLBUTRIN XL) 300 MG 24 hr tablet Take 1 tablet (300 mg total) by mouth once daily.    cholecalciferol, vitamin D3, (VITAMIN D3) 50 mcg (2,000 unit) Cap capsule TAKE 1 CAPSULE (2,000 UNITS TOTAL) BY MOUTH ONCE DAILY.    fluticasone propionate (FLONASE) 50 mcg/actuation nasal spray INSTILL 2 SPRAYS (100 MCG TOTAL) BY EACH NOSTRIL ROUTE ONCE DAILY.    metFORMIN (GLUCOPHAGE-XR) 500 MG ER 24hr tablet TAKE 1 TABLET BY MOUTH TWICE A DAY WITH MEALS    sildenafiL (VIAGRA) 100 MG tablet Take 1 tablet (100 mg total) by mouth daily as needed for Erectile Dysfunction.    aspirin 81 MG Chew Take 1 tablet (81 mg total) by mouth once daily.    ergocalciferol (ERGOCALCIFEROL) 50,000 unit Cap TAKE 1 CAPSULE BY MOUTH ONE TIME PER WEEK    nicotine polacrilex 2 MG Lozg Take 1 lozenge (2 mg total) by mouth as needed (1-2 per hour in the place of cigarette (5-10 daily max) (mini).    sertraline (ZOLOFT) 100 MG tablet TAKE 1 TABLET BY MOUTH EVERY DAY    tamsulosin (FLOMAX) 0.4 mg Cap TAKE TWO CAPSULES BY MOUTH DAILY IN THE EVENING.       Review of patient's allergies indicates:  No Known Allergies    Past Medical History:   Diagnosis Date    Anxiety 11/20/2015    Combined hyperlipidemia 11/23/2012    HEARING LOSS     Major depressive disorder, recurrent, moderate 11/23/2012     Obesity (BMI 30.0-34.9) 11/20/2015    diet and exercise changes discussed     BARAK on CPAP 11/20/2015    uses at least 4 hours nightly     Prediabetes 11/23/2016    making dietary and exercise changes and staring Metformin 500 mg XR daily.  recheck in 3 months    Smoker 05/02/2013     Past Surgical History:   Procedure Laterality Date    COLONOSCOPY N/A 7/12/2019    Procedure: COLONOSCOPY;  Surgeon: Amrit Reyes MD;  Location: [facility];  Service: Endoscopy;  Laterality: N/A;     Family History       Problem Relation (Age of Onset)    Heart disease Mother          Tobacco Use    Smoking status: Every Day     Packs/day: 0.50     Years: 1.00     Pack years: 0.50     Types: Cigarettes    Smokeless tobacco: Never    Tobacco comments:     Pt currently in the program   Substance and Sexual Activity    Alcohol use: Yes    Drug use: No    Sexual activity: Yes     Partners: Female     Review of Systems   Constitutional:  Negative for fever.   HENT:  Negative for sore throat.    Cardiovascular:  Negative for chest pain.   Musculoskeletal:  Negative for neck pain.   Neurological:  Negative for headaches.   Objective:     Vital Signs (Most Recent):  Temp: 98.5 °F (36.9 °C) (06/13/23 0609)  Pulse: (!) 56 (06/13/23 0609)  Resp: 18 (06/13/23 0609)  BP: 111/62 (06/13/23 0609)  SpO2: 97 % (06/13/23 0609) Vital Signs (24h Range):  Temp:  [98.5 °F (36.9 °C)] 98.5 °F (36.9 °C)  Pulse:  [56] 56  Resp:  [18] 18  SpO2:  [97 %] 97 %  BP: (111)/(62) 111/62     Weight: 99.1 kg (218 lb 7 oz)  Body mass index is 31.34 kg/m².    Physical Exam  HENT:      Head: Normocephalic.      Nose: Nose normal.      Mouth/Throat:      Mouth: Mucous membranes are moist.   Neck:      Trachea: Trachea normal.   Pulmonary:      Effort: Pulmonary effort is normal.      Breath sounds: No stridor.   Neurological:      Mental Status: He is alert.       Significant Labs:  CBC:   Recent Labs   Lab 06/09/23  1130   WBC 8.93   RBC 5.49   HGB 16.2   HCT 49.5   PLT  201   MCV 90   MCH 29.5   MCHC 32.7     CMP:   Recent Labs   Lab 06/09/23  1130   GLU 72   CALCIUM 9.6   ALBUMIN 4.0   PROT 6.9      K 4.3   CO2 24      BUN 15   CREATININE 1.0   ALKPHOS 124   ALT 23   AST 20   BILITOT 0.4         Assessment/Plan:     Arun intolerant to cpap  VTE Risk Mitigation (From admission, onward)           Ordered     IP VTE LOW RISK PATIENT  Once         06/13/23 0556     Place ARINA hose  Until discontinued         06/13/23 0556                To or for dise    Ann Gross MD  Otorhinolaryngology-Head & Neck Surgery  [facility]

## 2023-06-13 NOTE — DISCHARGE INSTRUCTIONS
DIET: You may resume your home diet. If nausea is present, increase your diet gradually with fluids and bland  Foods    ACTIVITY LEVEL: You have received sedation or an anesthetic, you may feel sleepy for several hours. Rest until  you are more awake. Gradually resume your normal activities    Medications: Pain medication should be taken only if needed and as directed. If antibiotics are prescribed, the  medication should be taken until completed.    No driving, alcoholic beverages or signing legal documents for next 24 hours or while taking pain  medication.    CALL THE DOCTOR:  For any obvious bleeding (some dried blood over the incision is normal).  Redness, swelling, foul smell around incision or fever over 101.  Shortness of breath, Coughing up Bloody sputum, Pains or Swelling in your Calves .  Persistent pain or nausea not relieved by medication.    If any unusual problems or difficulties occur contact your doctor. If you cannot contact your doctor but  feel your signs and symptoms warrant a physicians attention return to the emergency room.   Fall Prevention  Millions of people fall every year and injure themselves. You may have had anesthesia or sedation which may increase your risk of falling. You may have health issues that put you at an increased risk of falling.     Here are ways to reduce your risk of falling.    Make your home safe by keeping walkways clear of objects you may trip over.  Use non-slip pads under rugs. Do not use area rugs or small throw rugs.  Use non-slip mats in bathtubs and showers.  Install handrails and lights on staircases.  Do not walk in poorly lit areas.  Do not stand on chairs or wobbly ladders.  Use caution when reaching overhead or looking upward. This position can cause a loss of balance.  Be sure your shoes fit properly, have non-slip bottoms and are in good condition.   Wear shoes both inside and out. Avoid going barefoot or wearing slippers.  Be cautious when going up  and down stairs, curbs, and when walking on uneven sidewalks.  If your balance is poor, consider using a cane or walker.  If your fall was related to alcohol use, stop or limit alcohol intake.   If your fall was related to use of sleeping medicines, talk to your doctor about this. You may need to reduce your dosage at bedtime if you awaken during the night to go to the bathroom.    To reduce the need for nighttime bathroom trips:  Avoid drinking fluids for several hours before going to bed  Empty your bladder before going to bed  Men can keep a urinal at the bedside  Stay as active as you can. Balance, flexibility, strength, and endurance all come from exercise. They all play a role in preventing falls. Ask your healthcare provider which types of activity are right for you.  Get your vision checked on a regular basis.  If you have pets, know where they are before you stand up or walk so you don't trip over them.  Use night lights.

## 2023-06-13 NOTE — TRANSFER OF CARE
Anesthesia Transfer of Care Note    Patient: Stef Pool    Procedure(s) Performed: Procedure(s) (LRB):  SLEEP ENDOSCOPY,DRUG-INDUCED (N/A)    Patient location: PACU    Anesthesia Type: MAC    Transport from OR: Transported from OR on room air with adequate spontaneous ventilation    Post pain: adequate analgesia    Post assessment: no apparent anesthetic complications and tolerated procedure well    Post vital signs: stable    Level of consciousness: awake    Nausea/Vomiting: no nausea/vomiting    Complications: none    Transfer of care protocol was followed      Last vitals:   Visit Vitals  /62   Pulse (!) 59   Temp 36.8 °C (98.2 °F) (Temporal)   Resp 20   Wt 99.1 kg (218 lb 7 oz)   SpO2 96%   BMI 31.34 kg/m²

## 2023-06-13 NOTE — ANESTHESIA POSTPROCEDURE EVALUATION
Anesthesia Post Evaluation    Patient: Stef Pool    Procedure(s) Performed: Procedure(s) (LRB):  SLEEP ENDOSCOPY,DRUG-INDUCED (N/A)    Final Anesthesia Type: general      Patient location during evaluation: [facility]  Patient participation: Yes- Able to Participate  Level of consciousness: awake and alert, oriented and awake  Post-procedure vital signs: reviewed and stable  Airway patency: patent    PONV status at discharge: No PONV  Anesthetic complications: no      Cardiovascular status: blood pressure returned to baseline  Respiratory status: unassisted, spontaneous ventilation and room air  Hydration status: euvolemic  Follow-up not needed.          Vitals Value Taken Time   /69 06/13/23 0844   Temp 36.6 °C (97.8 °F) 06/13/23 0811   Pulse 57 06/13/23 0844   Resp 16 06/13/23 0844   SpO2 99 % 06/13/23 0844         Event Time   Out of Recovery 08:06:00         Pain/Jonathan Score: Jonathan Score: 10 (6/13/2023  8:08 AM)

## 2023-06-13 NOTE — BRIEF OP NOTE
[facility] [place] - Surgery  Brief Operative Note    Surgery Date: 6/13/2023     Surgeon(s) and Role:     * Ann Gross MD - Primary    Assisting Surgeon: None    Pre-op Diagnosis:  BARAK on CPAP [G47.33, Z99.89]  Intolerance of continuous positive airway pressure (CPAP) ventilation [Z78.9]    Post-op Diagnosis:  Post-Op Diagnosis Codes:     * BARAK on CPAP [G47.33, Z99.89]     * Intolerance of continuous positive airway pressure (CPAP) ventilation [Z78.9]    Procedure(s) (LRB):  SLEEP ENDOSCOPY,DRUG-INDUCED (N/A)    Anesthesia: General    Operative Findings: a-p collapse, enlarged uvula. 45% side wall collapse but not concentric  Severely deviated septum and turbinate hypertrophy, nasal crusting  No epiglottic collapse. Good improvement with jaw thrust    Estimated Blood Loss: 0         Specimens:   Specimen (24h ago, onward)      None              Discharge Note    OUTCOME: Patient tolerated treatment/procedure well without complication and will be discharged when meets pacu criteria.    DISPOSITION: Home or Self Care    FINAL DIAGNOSIS:  BARAK    FOLLOWUP: In clinic    DISCHARGE INSTRUCTIONS:  No discharge procedures on file.

## 2023-06-19 DIAGNOSIS — R09.81 NASAL CONGESTION: ICD-10-CM

## 2023-06-19 RX ORDER — FLUTICASONE PROPIONATE 50 MCG
SPRAY, SUSPENSION (ML) NASAL
Qty: 48 ML | Refills: 3 | Status: ON HOLD | OUTPATIENT
Start: 2023-06-19 | End: 2023-09-19 | Stop reason: HOSPADM

## 2023-06-19 NOTE — TELEPHONE ENCOUNTER
No care due was identified.  [facility] Embedded Care Due Messages. Reference number: [id number].   6/19/2023 12:15:56 AM CDT

## 2023-06-19 NOTE — TELEPHONE ENCOUNTER
Refill Decision Note   Stef Corine  is requesting a refill authorization.  Brief Assessment and Rationale for Refill:  Approve     Medication Therapy Plan:       Medication Reconciliation Completed: No   Comments:     No Care Gaps recommended.     Note composed:1:48 PM 06/19/2023

## 2023-06-27 ENCOUNTER — TELEPHONE (OUTPATIENT)
Dept: SMOKING CESSATION | Facility: CLINIC | Age: 68
End: 2023-06-27
Payer: MEDICARE

## 2023-06-27 NOTE — TELEPHONE ENCOUNTER
Unsuccessful contact with patient in regards to missing tobacco cessation follow up on 6/15/2023. Left a message with contact information to reschedule.

## 2023-06-29 ENCOUNTER — OFFICE VISIT (OUTPATIENT)
Dept: OTOLARYNGOLOGY | Facility: CLINIC | Age: 68
End: 2023-06-29
Payer: MEDICARE

## 2023-06-29 VITALS
SYSTOLIC BLOOD PRESSURE: 116 MMHG | DIASTOLIC BLOOD PRESSURE: 60 MMHG | BODY MASS INDEX: 31.62 KG/M2 | WEIGHT: 220.38 LBS

## 2023-06-29 DIAGNOSIS — G47.33 OSA ON CPAP: Primary | ICD-10-CM

## 2023-06-29 DIAGNOSIS — J34.3 NASAL TURBINATE HYPERTROPHY: ICD-10-CM

## 2023-06-29 DIAGNOSIS — J34.2 DEVIATED NASAL SEPTUM: ICD-10-CM

## 2023-06-29 PROCEDURE — 99214 OFFICE O/P EST MOD 30 MIN: CPT | Mod: S$GLB,,, | Performed by: OTOLARYNGOLOGY

## 2023-06-29 PROCEDURE — 99214 PR OFFICE/OUTPT VISIT, EST, LEVL IV, 30-39 MIN: ICD-10-PCS | Mod: S$GLB,,, | Performed by: OTOLARYNGOLOGY

## 2023-06-29 RX ORDER — AZELASTINE 1 MG/ML
1 SPRAY, METERED NASAL 2 TIMES DAILY
Qty: 30 ML | Refills: 3 | Status: ON HOLD | OUTPATIENT
Start: 2023-06-29 | End: 2023-09-19 | Stop reason: HOSPADM

## 2023-06-29 RX ORDER — FLUTICASONE PROPIONATE 50 MCG
2 SPRAY, SUSPENSION (ML) NASAL 2 TIMES DAILY
Qty: 18.2 ML | Refills: 3 | Status: ON HOLD | OUTPATIENT
Start: 2023-06-29 | End: 2023-09-19 | Stop reason: HOSPADM

## 2023-06-29 RX ORDER — LIDOCAINE HYDROCHLORIDE 10 MG/ML
1 INJECTION, SOLUTION EPIDURAL; INFILTRATION; INTRACAUDAL; PERINEURAL ONCE
Status: CANCELLED | OUTPATIENT
Start: 2023-06-29 | End: 2023-06-29

## 2023-06-29 RX ORDER — SODIUM CHLORIDE 0.9 % (FLUSH) 0.9 %
10 SYRINGE (ML) INJECTION
Status: DISCONTINUED | OUTPATIENT
Start: 2023-06-29 | End: 2023-09-12 | Stop reason: CLARIF

## 2023-06-29 NOTE — PATIENT INSTRUCTIONS
"Information and instructions from your visit with me today:    Start using the following medication nasal sprays:   Fluticasone spray:    This medication is a steroid spray. It stays within the nose and does not have absorption into the body that leads to side effects that one has with oral steroid medication. Fluticasone nasal spray is the same as the Flonase brand nasal spray. Discuss with your pharmacist if the price is lower over the counter or with a prescription ( this varies depending on insurance). The medication that is over the counter is the same as the prescription medication. Use this medication as instructed on the prescription, 1-2 sprays on each side of your nose twice daily.     Azelastine  spray:  This medication is an antihistamine used to treat nasal symptoms of allergy, which works specifically in the nose unlike antihistamine pills which have more of an effect on the whole body. Use this medication as instructed on the prescription, 1 spray on each side of your nose twice daily.     Additional instructions for medication sprays  Place the tip of the medication bottle in your nose and aim slightly up and out on each side to get medication high and deep into your nose and sinuses, and not have it all deposit in the very front of your nose. Aim the tip of the nozzle towards the outer corner of your eye . You can imagine aiming towards the back of your eyeball on each side for this, as opposed to straight back to the center of your nose and head.     You need to use this medication every day regardless of symptoms, as it takes time ( a few weeks) to work and get the benefits. It does not work on an "as needed" basis like taking a decongestant. If your symptoms only occur in a particular season, then the medication can be used seasonally instead of year long. For seasonal symptoms, you should start using the spray twice daily a month before when you normally have symptoms ( for example, if symptoms " start in August, should start at the end of June).     Start nasal irrigations with saline solution- you can either use a rinse or a mist spray:      NASAL SALINE SPRAY ( simply saline and arm and hammer are examples) There are several different brands found in the cold and flu aisle of the pharmacy. You can use any brand of saline spray - this will deliver the saline by a gentle mist ( if you have difficulty or discomfort with nasal rinse/ a lot of fluid in the nose, this will be more comfortable).       Always rinse your nose with saline prior to using medication sprays and wait a couple of hours before using again. You can use the saline throughout the day to help with stuffy nose or dry nose.    Do not use nasal decongestant sprays such as Afrin or similar products long term ( over 3 days) .  This can cause long term physical nasal addiction. Afrin should only be used if having nose bleeds, severe nasal congestion , or severe ear pain/fullness and should not be used for more than 2-3 days in a row . It is a not a medication that should be used for a long period of time.     It was nice meeting you today, and I look forward to helping you feel better soon. Please don't hesitate to call if you have any other questions or concerns, or if I can be of any assistance in the meantime.      Ann Gross MD    [facility]     Phone  [phone]    Fax      [phone]        Ann Gross MD  Otorhinolaryngology

## 2023-06-29 NOTE — PROGRESS NOTES
OTOLARYNGOLOGY CLINIC NOTE  Date:  06/29/2023     Chief complaint:  Chief Complaint   Patient presents with    Other     Pose op Dise       History of Present Illness  Stef Pool is a 67 y.o. male  presenting today for a followup. He underwent DISE 6-13-23 and noted to have deviated septum and 45% side wall collapse ( not concentric collapse however)    I originally saw the patient on 3-30-23. Below text is copied from initial note on that date describing history of present illness at time of presentation.   His machine has been recalled but he is still using the one he has. He wakes up tired. It does not stay on his face well. Takes it off at times.      No issues with falling asleep.  Feels not well rested; + snoring, no am headaches. + nocturia  PSG 2-25-23 with AHI of 61.7, no central apneas  Sleep study split night titrated up to 17 to get ahi below 5. Current cpap machine only at 8 and does not get benefit. Uses nasal mask without chin strap . He dislikes treatment and eports a lot of leaks as well. Seen by sleep np jocelyn pittman 1-26-23 and notes reviewed    Past Medical History  Past Medical History:   Diagnosis Date    Anxiety 11/20/2015    Combined hyperlipidemia 11/23/2012    HEARING LOSS     Major depressive disorder, recurrent, moderate 11/23/2012    Obesity (BMI 30.0-34.9) 11/20/2015    diet and exercise changes discussed     BARAK on CPAP 11/20/2015    uses at least 4 hours nightly     Prediabetes 11/23/2016    making dietary and exercise changes and staring Metformin 500 mg XR daily.  recheck in 3 months    Smoker 05/02/2013        Past Surgical History  Past Surgical History:   Procedure Laterality Date    COLONOSCOPY N/A 7/12/2019    Procedure: COLONOSCOPY;  Surgeon: Amrit Reyes MD;  Location: [facility] ENDO;  Service: Endoscopy;  Laterality: N/A;    SLEEP ENDOSCOPY, DRUG-INDUCED N/A 6/13/2023    Procedure: SLEEP ENDOSCOPY,DRUG-INDUCED;  Surgeon: Ann Gross MD;  Location: [facility] OR;   Service: ENT;  Laterality: N/A;  RN PREOP 06/09/2023 --NUPUR----NEED H/P        Medications  Current Outpatient Medications on File Prior to Visit   Medication Sig Dispense Refill    ALPRAZolam (XANAX) 0.5 MG tablet Take 1 tablet (0.5 mg total) by mouth every evening. 90 tablet 1    aspirin 81 MG Chew Take 1 tablet (81 mg total) by mouth once daily.      atorvastatin (LIPITOR) 40 MG tablet TAKE 1 TABLET BY MOUTH EVERY DAY 90 tablet 3    cholecalciferol, vitamin D3, (VITAMIN D3) 50 mcg (2,000 unit) Cap capsule TAKE 1 CAPSULE (2,000 UNITS TOTAL) BY MOUTH ONCE DAILY. 90 capsule 3    ergocalciferol (ERGOCALCIFEROL) 50,000 unit Cap TAKE 1 CAPSULE BY MOUTH ONE TIME PER WEEK 12 capsule 4    fluticasone propionate (FLONASE) 50 mcg/actuation nasal spray INSTILL 2 SPRAYS (100 MCG TOTAL) BY EACH NOSTRIL ROUTE ONCE DAILY. 48 mL 3    metFORMIN (GLUCOPHAGE-XR) 500 MG ER 24hr tablet TAKE 1 TABLET BY MOUTH TWICE A DAY WITH MEALS 180 tablet 1    nicotine (NICODERM CQ) 14 mg/24 hr Place 1 patch onto the skin once daily. 28 patch 0    nicotine polacrilex 2 MG Lozg Take 1 lozenge (2 mg total) by mouth as needed (1-2 per hour in the place of cigarette (5-10 daily max) (mini). 144 lozenge 0    sertraline (ZOLOFT) 100 MG tablet TAKE 1 TABLET BY MOUTH EVERY DAY 90 tablet 2    tamsulosin (FLOMAX) 0.4 mg Cap TAKE TWO CAPSULES BY MOUTH DAILY IN THE EVENING. 180 capsule 2    buPROPion (WELLBUTRIN XL) 300 MG 24 hr tablet Take 1 tablet (300 mg total) by mouth once daily. 90 tablet 4    sildenafiL (VIAGRA) 100 MG tablet Take 1 tablet (100 mg total) by mouth daily as needed for Erectile Dysfunction. 9 tablet 11     Current Facility-Administered Medications on File Prior to Visit   Medication Dose Route Frequency Provider Last Rate Last Admin    sodium chloride 0.9% flush 10 mL  10 mL Intravenous PRN Ann Gross MD           Review of Systems  Review of Systems   Constitutional:  Negative for fever.   HENT:  Positive for congestion. Negative  for nosebleeds.    Respiratory:  Negative for hemoptysis.    Musculoskeletal:  Negative for neck pain.   Skin:  Negative for itching.   Neurological:  Negative for dizziness.        Social History   reports that he has been smoking cigarettes. He has a 0.50 pack-year smoking history. He has never used smokeless tobacco. He reports current alcohol use. He reports that he does not use drugs.     Family History  Family History   Problem Relation Age of Onset    Heart disease Mother         Physical Exam   Vitals:    06/29/23 0917   BP: 116/60    Body mass index is 31.62 kg/m².  Weight: 100 kg (220 lb 5.6 oz)         GENERAL: no acute distress.  HEAD: normocephalic.   EYES: No scleral icterus  EARS: external ear without lesion, normal pinna shape and position.   NOSE: external nose without significant bony abnormality  ORAL CAVITY/OROPHARYNX: tongue mobile.   NECK: trachea midline.   LYMPH NODES:No cervical lymphadenopathy.  RESPIRATORY: no stridor, no stertor. Voice normal. Respirations nonlabored.  NEURO: alert, responds to questions appropriately.    PSYCH:mood appropriate      Imaging:  The patient does not have any new imaging of the head and neck since last visit.     Labs:  CBC  Recent Labs   Lab 04/22/22  0709 04/24/23  0655 06/09/23  1130   WBC 8.04 8.22 8.93   Hemoglobin 15.0 14.2 16.2   Hematocrit 46.4 44.4 49.5   MCV 90 91 90   Platelets 197 175 201     BMP  Recent Labs   Lab 04/22/22  0709 10/24/22  0655 04/24/23  0655 06/09/23  1130   Glucose 95 104 99 72   Sodium 141 142 140 139   Potassium 4.2 4.3 4.1 4.3   Chloride 108 109 108 107   CO2 22 L 24 25 24   BUN 18 14 17 15   Creatinine 1.0 0.9 1.0 1.0   Calcium 9.4 9.0 9.1 9.6   Magnesium 2.1  --   --   --      COAGS        Assessment  1. BARAK on CPAP  - Vital signs; Standing  - Diet NPO; Standing  - Case Request Operating Room: SEPTOPLASTY, NOSE, REDUCTION, NASAL TURBINATE  - Full code; Standing  - Place sequential compression device; Standing  - Place in  Outpatient; Standing  - Diet NPO  - Full code    2. Deviated nasal septum  - Vital signs; Standing  - Diet NPO; Standing  - Case Request Operating Room: SEPTOPLASTY, NOSE, REDUCTION, NASAL TURBINATE  - Full code; Standing  - Place sequential compression device; Standing  - Place in Outpatient; Standing  - Diet NPO  - Full code    3. Nasal turbinate hypertrophy  - Vital signs; Standing  - Diet NPO; Standing  - Case Request Operating Room: SEPTOPLASTY, NOSE, REDUCTION, NASAL TURBINATE  - Full code; Standing  - Place sequential compression device; Standing  - Place in Outpatient; Standing  - Diet NPO  - Full code       Plan:  Discussed plan of care with patient in detail and all questions answered. Patient reported understanding of plan of care. I gave the patient the opportunity to ask questions and patient confirmed all questions answered to satisfaction.     Given overall anatomic picture, would recommend trying nasal surgery and repeat trial with cpap first. If still unable to tolerate could consider inspire but does have sidewall collapse- not 75% which would make him not a candidate but I think septo/turbs and repeat trial with mask is better route to start with     Saline, flonase and astelin BID recommended in the interim     He would benefit from surgical intervention for nasal obstruction that has proven refractory to medical management. This would involve correction of deviated septum with  septoplasty . Inferior turbinate reduction with would be included.  I discussed the risks, benefits and alternatives to surgery with the patient, as well as the expected postoperative course. This included but was not limited to vision loss, csf leak, dental numbness ( permanent or temporary) , septal perforation , synechiae, pain, infection, bleeding. We discussed that patient will need to continue medical mgmt for allergy and sinus disease and that purpose of procedure is to allow for better delivery of medications to  the sinonasal cavity and allow for more space in the nose.  We discussed about the possibility that the turbinates can enlarge again over time and we also discussed that the septum can re-deviate over time.     We discussed expected postoperative course and about nasal splints that would be placed at time of surgery. Counseled the patient that splints would stay in place for 1 week. Discussed postoperative restrictions with lifting and other activities.   I gave him the opportunity to ask questions and I answered all of them.  I provided relevant printed information on his condition for him to review at home.  Same-day discharge is anticipated.  He will need evaluation in the pre-anesthesia clinic. The surgery will be  scheduled for 919-23.  He will need to return for a postoperative visit 1 week after surgery.       Please be aware that this note has been generated with the assistance of [employer] voice-to-text.  Please excuse any spelling or grammatical errors.

## 2023-07-04 DIAGNOSIS — N40.1 BENIGN PROSTATIC HYPERPLASIA WITH URINARY HESITANCY: ICD-10-CM

## 2023-07-04 DIAGNOSIS — R39.11 BENIGN PROSTATIC HYPERPLASIA WITH URINARY HESITANCY: ICD-10-CM

## 2023-07-04 DIAGNOSIS — F33.1 MAJOR DEPRESSIVE DISORDER, RECURRENT, MODERATE: ICD-10-CM

## 2023-07-04 RX ORDER — TAMSULOSIN HYDROCHLORIDE 0.4 MG/1
CAPSULE ORAL
Qty: 180 CAPSULE | Refills: 3 | Status: SHIPPED | OUTPATIENT
Start: 2023-07-04 | End: 2023-11-13 | Stop reason: SDUPTHER

## 2023-07-04 RX ORDER — SERTRALINE HYDROCHLORIDE 100 MG/1
TABLET, FILM COATED ORAL
Qty: 90 TABLET | Refills: 3 | Status: SHIPPED | OUTPATIENT
Start: 2023-07-04 | End: 2023-11-13 | Stop reason: SDUPTHER

## 2023-07-04 NOTE — TELEPHONE ENCOUNTER
No care due was identified.  Health [facility] Embedded Care Due Messages. Reference number: [id number].   7/04/2023 5:48:05 PM CDT

## 2023-07-05 NOTE — TELEPHONE ENCOUNTER
Refill Decision Note   Stef Pool  is requesting a refill authorization.  Brief Assessment and Rationale for Refill:  Approve     Medication Therapy Plan:         Comments:     Note composed:7:26 PM 07/04/2023             Appointments     Last Visit   4/26/2023 Caitlyn Fong MD   Next Visit   10/27/2023 Caitlyn Fong MD

## 2023-07-28 ENCOUNTER — PATIENT MESSAGE (OUTPATIENT)
Dept: CARDIOLOGY | Facility: CLINIC | Age: 68
End: 2023-07-28
Payer: MEDICARE

## 2023-07-31 ENCOUNTER — OFFICE VISIT (OUTPATIENT)
Dept: DERMATOLOGY | Facility: CLINIC | Age: 68
End: 2023-07-31
Payer: COMMERCIAL

## 2023-07-31 DIAGNOSIS — L72.0 EIC (EPIDERMAL INCLUSION CYST): ICD-10-CM

## 2023-07-31 DIAGNOSIS — L81.4 LENTIGO: ICD-10-CM

## 2023-07-31 DIAGNOSIS — Z12.83 SCREENING EXAM FOR SKIN CANCER: ICD-10-CM

## 2023-07-31 DIAGNOSIS — L82.1 SK (SEBORRHEIC KERATOSIS): Primary | ICD-10-CM

## 2023-07-31 PROCEDURE — 99999 PR PBB SHADOW E&M-EST. PATIENT-LVL III: ICD-10-PCS | Mod: PBBFAC,,, | Performed by: DERMATOLOGY

## 2023-07-31 PROCEDURE — 1126F AMNT PAIN NOTED NONE PRSNT: CPT | Mod: CPTII,S$GLB,, | Performed by: DERMATOLOGY

## 2023-07-31 PROCEDURE — 1101F PR PT FALLS ASSESS DOC 0-1 FALLS W/OUT INJ PAST YR: ICD-10-PCS | Mod: CPTII,S$GLB,, | Performed by: DERMATOLOGY

## 2023-07-31 PROCEDURE — 1159F MED LIST DOCD IN RCRD: CPT | Mod: CPTII,S$GLB,, | Performed by: DERMATOLOGY

## 2023-07-31 PROCEDURE — 3288F PR FALLS RISK ASSESSMENT DOCUMENTED: ICD-10-PCS | Mod: CPTII,S$GLB,, | Performed by: DERMATOLOGY

## 2023-07-31 PROCEDURE — 3288F FALL RISK ASSESSMENT DOCD: CPT | Mod: CPTII,S$GLB,, | Performed by: DERMATOLOGY

## 2023-07-31 PROCEDURE — 3044F HG A1C LEVEL LT 7.0%: CPT | Mod: CPTII,S$GLB,, | Performed by: DERMATOLOGY

## 2023-07-31 PROCEDURE — 1160F RVW MEDS BY RX/DR IN RCRD: CPT | Mod: CPTII,S$GLB,, | Performed by: DERMATOLOGY

## 2023-07-31 PROCEDURE — 1160F PR REVIEW ALL MEDS BY PRESCRIBER/CLIN PHARMACIST DOCUMENTED: ICD-10-PCS | Mod: CPTII,S$GLB,, | Performed by: DERMATOLOGY

## 2023-07-31 PROCEDURE — 99213 OFFICE O/P EST LOW 20 MIN: CPT | Mod: S$GLB,,, | Performed by: DERMATOLOGY

## 2023-07-31 PROCEDURE — 1126F PR PAIN SEVERITY QUANTIFIED, NO PAIN PRESENT: ICD-10-PCS | Mod: CPTII,S$GLB,, | Performed by: DERMATOLOGY

## 2023-07-31 PROCEDURE — 99213 PR OFFICE/OUTPT VISIT, EST, LEVL III, 20-29 MIN: ICD-10-PCS | Mod: S$GLB,,, | Performed by: DERMATOLOGY

## 2023-07-31 PROCEDURE — 1101F PT FALLS ASSESS-DOCD LE1/YR: CPT | Mod: CPTII,S$GLB,, | Performed by: DERMATOLOGY

## 2023-07-31 PROCEDURE — 1159F PR MEDICATION LIST DOCUMENTED IN MEDICAL RECORD: ICD-10-PCS | Mod: CPTII,S$GLB,, | Performed by: DERMATOLOGY

## 2023-07-31 PROCEDURE — 99999 PR PBB SHADOW E&M-EST. PATIENT-LVL III: CPT | Mod: PBBFAC,,, | Performed by: DERMATOLOGY

## 2023-07-31 PROCEDURE — 3044F PR MOST RECENT HEMOGLOBIN A1C LEVEL <7.0%: ICD-10-PCS | Mod: CPTII,S$GLB,, | Performed by: DERMATOLOGY

## 2023-07-31 NOTE — PROGRESS NOTES
Subjective:      Patient ID:  Stef Pool is a 67 y.o. male who presents for   Chief Complaint   Patient presents with    Skin Check     TBSE     History of Present Illness: The patient presents for follow up of skin check.    The patient was last seen on: 1/25/2018 for TBSE     No hx skin cancer.     Patient with new complaint of lesion(s)  Location: forehead  Duration: 2 yrs   Symptoms: may be growing   Relieving factors/Previous treatments: none    Smokes 3/4 ppd      Review of Systems   Skin:  Positive for wears hat (rarely). Negative for daily sunscreen use, activity-related sunscreen use and recent sunburn.   Hematologic/Lymphatic: Bruises/bleeds easily (baby aspirin).       Objective:   Physical Exam   Constitutional: He appears well-developed and well-nourished. No distress.   Neurological: He is alert and oriented to person, place, and time. He is not disoriented.   Psychiatric: He has a normal mood and affect.   Skin:   Areas Examined (abnormalities noted in diagram):   Head / Face Inspection Performed  Neck Inspection Performed  Chest / Axilla Inspection Performed  Back Inspection Performed  RUE Inspected  LUE Inspection Performed  RLE Inspected  LLE Inspection Performed                 Diagram Legend     Erythematous scaling macule/papule c/w actinic keratosis       Vascular papule c/w angioma      Pigmented verrucoid papule/plaque c/w seborrheic keratosis      Yellow umbilicated papule c/w sebaceous hyperplasia      Irregularly shaped tan macule c/w lentigo     1-2 mm smooth white papules consistent with Milia      Movable subcutaneous cyst with punctum c/w epidermal inclusion cyst      Subcutaneous movable cyst c/w pilar cyst      Firm pink to brown papule c/w dermatofibroma      Pedunculated fleshy papule(s) c/w skin tag(s)      Evenly pigmented macule c/w junctional nevus     Mildly variegated pigmented, slightly irregular-bordered macule c/w mildly atypical nevus      Flesh colored to evenly  pigmented papule c/w intradermal nevus       Pink pearly papule/plaque c/w basal cell carcinoma      Erythematous hyperkeratotic cursted plaque c/w SCC      Surgical scar with no sign of skin cancer recurrence      Open and closed comedones      Inflammatory papules and pustules      Verrucoid papule consistent consistent with wart     Erythematous eczematous patches and plaques     Dystrophic onycholytic nail with subungual debris c/w onychomycosis     Umbilicated papule    Erythematous-base heme-crusted tan verrucoid plaque consistent with inflamed seborrheic keratosis     Erythematous Silvery Scaling Plaque c/w Psoriasis     See annotation      Assessment / Plan:        SK (seborrheic keratosis)  These are benign inherited growths without a malignant potential. Reassurance given to patient. No treatment is necessary.       Lentigo   - minor problem and chronic.   Reassurance given to patient. No treatment necessary.       EIC (epidermal inclusion cyst)   - minor problem and chronic.   Reassurance given to patient. No treatment necessary.       Screening exam for skin cancer    Upper body skin examination performed today including at least 6 points as noted in physical examination. No lesions suspicious for malignancy noted.    Recommend daily sun protection/avoidance and use of at least SPF 30, broad spectrum sunscreen (OTC drug).                No follow-ups on file.

## 2023-08-01 DIAGNOSIS — F33.1 MAJOR DEPRESSIVE DISORDER, RECURRENT, MODERATE: ICD-10-CM

## 2023-08-01 DIAGNOSIS — Z71.6 TOBACCO ABUSE COUNSELING: ICD-10-CM

## 2023-08-01 DIAGNOSIS — R73.03 PREDIABETES: ICD-10-CM

## 2023-08-01 RX ORDER — BUPROPION HYDROCHLORIDE 300 MG/1
300 TABLET ORAL DAILY
Qty: 90 TABLET | Refills: 4 | Status: SHIPPED | OUTPATIENT
Start: 2023-08-01 | End: 2023-11-13 | Stop reason: SDUPTHER

## 2023-08-01 RX ORDER — METFORMIN HYDROCHLORIDE 500 MG/1
500 TABLET, EXTENDED RELEASE ORAL 2 TIMES DAILY WITH MEALS
Qty: 180 TABLET | Refills: 4 | Status: SHIPPED | OUTPATIENT
Start: 2023-08-01 | End: 2023-11-13 | Stop reason: SDUPTHER

## 2023-08-01 RX ORDER — BUPROPION HYDROCHLORIDE 300 MG/1
TABLET ORAL
Qty: 90 TABLET | Refills: 2 | Status: SHIPPED | OUTPATIENT
Start: 2023-08-01 | End: 2023-08-01 | Stop reason: SDUPTHER

## 2023-08-01 NOTE — TELEPHONE ENCOUNTER
No care due was identified.  [facility] Embedded Care Due Messages. Reference number: [id number].   8/01/2023 11:34:05 AM CDT  
Home

## 2023-08-01 NOTE — TELEPHONE ENCOUNTER
Refill Decision Note   Stef Pool  is requesting a refill authorization.  Brief Assessment and Rationale for Refill:  Approve     Medication Therapy Plan:  FLOS      Comments:     Note composed:11:58 AM 08/01/2023             Appointments     Last Visit   4/26/2023 Caitlyn Fong MD   Next Visit   8/1/2023 Caitlyn Fong MD

## 2023-08-01 NOTE — TELEPHONE ENCOUNTER
Care Due:                  Date            Visit Type   Department     Provider  --------------------------------------------------------------------------------                                EP -                              [facility]  Last Visit: 04-      CARE ([facility])   SERA Fong                              [facility]  Next Visit: 10-      CARE ([facility])   SERA Fong                                                            Last  Test          Frequency    Reason                     Performed    Due Date  --------------------------------------------------------------------------------    HBA1C.......  6 months...  metFORMIN................  04-   10-    Health [facility] Embedded Care Due Messages. Reference number: [id number].   8/01/2023 11:30:50 AM CDT

## 2023-09-12 ENCOUNTER — ANESTHESIA EVENT (OUTPATIENT)
Dept: SURGERY | Facility: HOSPITAL | Age: 68
End: 2023-09-12
Payer: MEDICARE

## 2023-09-12 ENCOUNTER — HOSPITAL ENCOUNTER (OUTPATIENT)
Dept: PREADMISSION TESTING | Facility: HOSPITAL | Age: 68
Discharge: HOME | End: 2023-09-12
Attending: OTOLARYNGOLOGY
Payer: MEDICARE

## 2023-09-12 VITALS
BODY MASS INDEX: 32.69 KG/M2 | WEIGHT: 228.38 LBS | SYSTOLIC BLOOD PRESSURE: 129 MMHG | HEART RATE: 59 BPM | DIASTOLIC BLOOD PRESSURE: 62 MMHG | TEMPERATURE: 96 F | OXYGEN SATURATION: 96 % | RESPIRATION RATE: 18 BRPM | HEIGHT: 70 IN

## 2023-09-12 DIAGNOSIS — Z01.818 PREOP TESTING: Primary | ICD-10-CM

## 2023-09-12 LAB
ALBUMIN SERPL BCP-MCNC: 3.9 G/DL (ref 3.5–5.2)
ALP SERPL-CCNC: 121 U/L (ref 55–135)
ALT SERPL W/O P-5'-P-CCNC: 27 U/L (ref 10–44)
ANION GAP SERPL CALC-SCNC: 7 MMOL/L (ref 8–16)
AST SERPL-CCNC: 22 U/L (ref 10–40)
BASOPHILS # BLD AUTO: 0.04 K/UL (ref 0–0.2)
BASOPHILS NFR BLD: 0.4 % (ref 0–1.9)
BILIRUB SERPL-MCNC: 0.3 MG/DL (ref 0.1–1)
BUN SERPL-MCNC: 15 MG/DL (ref 8–23)
CALCIUM SERPL-MCNC: 9.5 MG/DL (ref 8.7–10.5)
CHLORIDE SERPL-SCNC: 106 MMOL/L (ref 95–110)
CO2 SERPL-SCNC: 24 MMOL/L (ref 23–29)
CREAT SERPL-MCNC: 1 MG/DL (ref 0.5–1.4)
DIFFERENTIAL METHOD: NORMAL
EOSINOPHIL # BLD AUTO: 0.5 K/UL (ref 0–0.5)
EOSINOPHIL NFR BLD: 4.7 % (ref 0–8)
ERYTHROCYTE [DISTWIDTH] IN BLOOD BY AUTOMATED COUNT: 14.5 % (ref 11.5–14.5)
EST. GFR  (NO RACE VARIABLE): >60 ML/MIN/1.73 M^2
GLUCOSE SERPL-MCNC: 130 MG/DL (ref 70–110)
HCT VFR BLD AUTO: 44.6 % (ref 40–54)
HGB BLD-MCNC: 14.3 G/DL (ref 14–18)
IMM GRANULOCYTES # BLD AUTO: 0.04 K/UL (ref 0–0.04)
IMM GRANULOCYTES NFR BLD AUTO: 0.4 % (ref 0–0.5)
LYMPHOCYTES # BLD AUTO: 2.1 K/UL (ref 1–4.8)
LYMPHOCYTES NFR BLD: 22.3 % (ref 18–48)
MCH RBC QN AUTO: 28.7 PG (ref 27–31)
MCHC RBC AUTO-ENTMCNC: 32.1 G/DL (ref 32–36)
MCV RBC AUTO: 90 FL (ref 82–98)
MONOCYTES # BLD AUTO: 0.7 K/UL (ref 0.3–1)
MONOCYTES NFR BLD: 6.9 % (ref 4–15)
NEUTROPHILS # BLD AUTO: 6.3 K/UL (ref 1.8–7.7)
NEUTROPHILS NFR BLD: 65.3 % (ref 38–73)
NRBC BLD-RTO: 0 /100 WBC
PLATELET # BLD AUTO: 169 K/UL (ref 150–450)
PMV BLD AUTO: 10.6 FL (ref 9.2–12.9)
POTASSIUM SERPL-SCNC: 4 MMOL/L (ref 3.5–5.1)
PROT SERPL-MCNC: 6.6 G/DL (ref 6–8.4)
RBC # BLD AUTO: 4.98 M/UL (ref 4.6–6.2)
SODIUM SERPL-SCNC: 137 MMOL/L (ref 136–145)
WBC # BLD AUTO: 9.59 K/UL (ref 3.9–12.7)

## 2023-09-12 PROCEDURE — 36415 COLL VENOUS BLD VENIPUNCTURE: CPT | Performed by: OTOLARYNGOLOGY

## 2023-09-12 PROCEDURE — 80053 COMPREHEN METABOLIC PANEL: CPT | Performed by: OTOLARYNGOLOGY

## 2023-09-12 PROCEDURE — 85025 COMPLETE CBC W/AUTO DIFF WBC: CPT | Performed by: OTOLARYNGOLOGY

## 2023-09-12 NOTE — DISCHARGE INSTRUCTIONS

## 2023-09-12 NOTE — ANESTHESIA PREPROCEDURE EVALUATION
09/12/2023  Stef Pool is a 67 y.o., male   To undergo Procedure(s) (LRB):  SEPTOPLASTY, NOSE (N/A)  REDUCTION, NASAL TURBINATE (Bilateral)     Denies CP/SOB/MI/CVA/URI symptoms.  Endorses occasional GERD with certain foods.  METS > 4  NPO > 8    Past Medical History:  Past Medical History:   Diagnosis Date    Anxiety 11/20/2015    Combined hyperlipidemia 11/23/2012    HEARING LOSS     Major depressive disorder, recurrent, moderate 11/23/2012    Obesity (BMI 30.0-34.9) 11/20/2015    diet and exercise changes discussed     BARAK on CPAP 11/20/2015    uses at least 4 hours nightly     Prediabetes 11/23/2016    making dietary and exercise changes and staring Metformin 500 mg XR daily.  recheck in 3 months    Smoker 05/02/2013       Past Surgical History:  Past Surgical History:   Procedure Laterality Date    COLONOSCOPY N/A 7/12/2019    Procedure: COLONOSCOPY;  Surgeon: Amrit Reyes MD;  Location: [facility] ENDO;  Service: Endoscopy;  Laterality: N/A;    SLEEP ENDOSCOPY, DRUG-INDUCED N/A 6/13/2023    Procedure: SLEEP ENDOSCOPY,DRUG-INDUCED;  Surgeon: Ann Gross MD;  Location: [facility] OR;  Service: ENT;  Laterality: N/A;  RN PREOP 06/09/2023 --JM----NEED H/P       Social History:  Social History     Socioeconomic History    Marital status:    Tobacco Use    Smoking status: Every Day     Current packs/day: 0.50     Average packs/day: 0.5 packs/day for 1 year (0.5 ttl pk-yrs)     Types: Cigarettes    Smokeless tobacco: Never    Tobacco comments:     Pt currently in the program   Substance and Sexual Activity    Alcohol use: Yes    Drug use: No    Sexual activity: Yes     Partners: Female       Medications:  No current facility-administered medications on file prior to encounter.     Current Outpatient Medications on File Prior to Encounter   Medication Sig Dispense Refill    ALPRAZolam (XANAX) 0.5 MG tablet Take 1 tablet (0.5 mg total) by mouth every evening. 90 tablet 1     atorvastatin (LIPITOR) 40 MG tablet TAKE 1 TABLET BY MOUTH EVERY DAY 90 tablet 3    azelastine (ASTELIN) 137 mcg (0.1 %) nasal spray 1 spray (137 mcg total) by Nasal route 2 (two) times daily. 30 mL 3    cholecalciferol, vitamin D3, (VITAMIN D3) 50 mcg (2,000 unit) Cap capsule TAKE 1 CAPSULE (2,000 UNITS TOTAL) BY MOUTH ONCE DAILY. 90 capsule 3    ergocalciferol (ERGOCALCIFEROL) 50,000 unit Cap TAKE 1 CAPSULE BY MOUTH ONE TIME PER WEEK 12 capsule 4    fluticasone propionate (FLONASE) 50 mcg/actuation nasal spray INSTILL 2 SPRAYS (100 MCG TOTAL) BY EACH NOSTRIL ROUTE ONCE DAILY. 48 mL 3    nicotine (NICODERM CQ) 14 mg/24 hr Place 1 patch onto the skin once daily. 28 patch 0    sildenafiL (VIAGRA) 100 MG tablet Take 1 tablet (100 mg total) by mouth daily as needed for Erectile Dysfunction. 9 tablet 11    aspirin 81 MG Chew Take 1 tablet (81 mg total) by mouth once daily.      fluticasone propionate (FLONASE) 50 mcg/actuation nasal spray 2 sprays (100 mcg total) by Each Nostril route 2 (two) times daily. 18.2 mL 3    nicotine polacrilex 2 MG Lozg Take 1 lozenge (2 mg total) by mouth as needed (1-2 per hour in the place of cigarette (5-10 daily max) (mini). 144 lozenge 0       Allergies:  Review of patient's allergies indicates:  No Known Allergies    Active Problems:  Patient Active Problem List   Diagnosis    Carpal tunnel syndrome on both sides    Combined hyperlipidemia    Major depressive disorder, recurrent, moderate    Medication management    Long-term use of aspirin therapy    BARAK on CPAP    Class 1 obesity due to excess calories with serious comorbidity and body mass index (BMI) of 30.0 to 30.9 in adult    Anxiety    Prediabetes    Tobacco abuse counseling    Nuclear sclerosis, bilateral    Vitamin D deficiency    History of colon polyps    Varicose veins of leg with pain, bilateral    Venous insufficiency of lower extremity    Tobacco abuse    Cable cardiac risk >20% in next 10  years       Diagnostic Studies:   Latest Reference Range & Units 09/12/23 09:40   WBC 3.90 - 12.70 K/uL 9.59   RBC 4.60 - 6.20 M/uL 4.98   Hemoglobin 14.0 - 18.0 g/dL 14.3   Hematocrit 40.0 - 54.0 % 44.6   MCV 82 - 98 fL 90   MCH 27.0 - 31.0 pg 28.7   MCHC 32.0 - 36.0 g/dL 32.1   RDW 11.5 - 14.5 % 14.5   Platelets 150 - 450 K/uL 169   MPV 9.2 - 12.9 fL 10.6   Gran % 38.0 - 73.0 % 65.3   Lymph % 18.0 - 48.0 % 22.3   Mono % 4.0 - 15.0 % 6.9   Eosinophil % 0.0 - 8.0 % 4.7   Basophil % 0.0 - 1.9 % 0.4   Immature Granulocytes 0.0 - 0.5 % 0.4   Gran # (ANC) 1.8 - 7.7 K/uL 6.3   Lymph # 1.0 - 4.8 K/uL 2.1   Mono # 0.3 - 1.0 K/uL 0.7   Eos # 0.0 - 0.5 K/uL 0.5   Baso # 0.00 - 0.20 K/uL 0.04   Immature Grans (Abs) 0.00 - 0.04 K/uL 0.04   nRBC 0 /100 WBC 0   Differential Method  Automated      Latest Reference Range & Units 09/12/23 09:40   Sodium 136 - 145 mmol/L 137   Potassium 3.5 - 5.1 mmol/L 4.0   Chloride 95 - 110 mmol/L 106   CO2 23 - 29 mmol/L 24   Anion Gap 8 - 16 mmol/L 7 (L)   BUN 8 - 23 mg/dL 15   Creatinine 0.5 - 1.4 mg/dL 1.0   eGFR >60 mL/min/1.73 m^2 >60     EKG (6/9/23):  SB    TTE (11/29/21):   The left ventricle is normal in size with normal systolic function.   The estimated ejection fraction is 60%.   Normal left ventricular diastolic function.   The estimated PA systolic pressure is 26 mmHg.   With normal right ventricular systolic function.   Intermediate central venous pressure (8 mmHg).    24 Hour Vitals:  Temp:  [36.7 °C (98.1 °F)] 36.7 °C (98.1 °F)  Pulse:  [59] 59  Resp:  [16] 16  SpO2:  [96 %] 96 %  BP: (109)/(57) 109/57   See Nursing Charting For Additional Vitals    Pre-op Assessment    I have reviewed the Patient Summary Reports.     I have reviewed the Nursing Notes. I have reviewed the NPO Status.   I have reviewed the Medications.     Review of Systems  Anesthesia Hx:  No problems with previous Anesthesia  Denies Family Hx of Anesthesia complications.   Denies Personal Hx of Anesthesia  complications.   Social:  Smoker, Alcohol Use    Hematology/Oncology:  Hematology Normal   Oncology Normal     EENT/Dental:   Deviated septum, nasal turbinate hypertrophy   Cardiovascular:   Exercise tolerance: good hyperlipidemia ECG has been reviewed.  Functional Capacity good / => 4 METS    Pulmonary:   Sleep Apnea, CPAP    Renal/:  Renal/ Normal     Hepatic/GI:  Hepatic/GI Normal    Musculoskeletal:  Musculoskeletal Normal    Neurological:  Neurology Normal    Endocrine:  Obesity / BMI > 30  Dermatological:  Skin Normal    Psych:   anxiety depression          Physical Exam  General: Well nourished and Cooperative    Airway:  Mallampati: III   Mouth Opening: Normal  TM Distance: Normal      Dental:  Edentulous    Chest/Lungs:  Clear to auscultation, Normal Respiratory Rate    Heart:  Rate: Normal  Rhythm: Regular Rhythm        Anesthesia Plan  Type of Anesthesia, risks & benefits discussed:    Anesthesia Type: Gen ETT  Intra-op Monitoring Plan: Standard ASA Monitors  Post Op Pain Control Plan: multimodal analgesia and IV/PO Opioids PRN  Induction:  IV  Airway Plan: Direct and Video, Post-Induction  Informed Consent: Informed consent signed with the Patient and all parties understand the risks and agree with anesthesia plan.  All questions answered. Patient consented to blood products? Yes  ASA Score: 2  Anesthesia Plan Notes:   GA with OETT  Standard ASA monitors  Recovery in PACU  PONV: 1    Ready For Surgery From Anesthesia Perspective.     .

## 2023-09-18 ENCOUNTER — PATIENT MESSAGE (OUTPATIENT)
Dept: SURGERY | Facility: HOSPITAL | Age: 68
End: 2023-09-18
Payer: MEDICARE

## 2023-09-19 ENCOUNTER — HOSPITAL ENCOUNTER (OUTPATIENT)
Facility: HOSPITAL | Age: 68
Discharge: HOME | End: 2023-09-19
Attending: OTOLARYNGOLOGY | Admitting: OTOLARYNGOLOGY
Payer: MEDICARE

## 2023-09-19 ENCOUNTER — ANESTHESIA (OUTPATIENT)
Dept: SURGERY | Facility: HOSPITAL | Age: 68
End: 2023-09-19
Payer: MEDICARE

## 2023-09-19 VITALS
BODY MASS INDEX: 32.77 KG/M2 | SYSTOLIC BLOOD PRESSURE: 122 MMHG | HEART RATE: 62 BPM | OXYGEN SATURATION: 95 % | DIASTOLIC BLOOD PRESSURE: 57 MMHG | RESPIRATION RATE: 19 BRPM | WEIGHT: 228.38 LBS | TEMPERATURE: 98 F

## 2023-09-19 DIAGNOSIS — J34.2 DEVIATED NASAL SEPTUM: ICD-10-CM

## 2023-09-19 DIAGNOSIS — J34.3 NASAL TURBINATE HYPERTROPHY: ICD-10-CM

## 2023-09-19 DIAGNOSIS — R73.03 PREDIABETES: Primary | ICD-10-CM

## 2023-09-19 DIAGNOSIS — G47.33 OSA ON CPAP: ICD-10-CM

## 2023-09-19 DIAGNOSIS — J34.2 DEVIATED SEPTUM: ICD-10-CM

## 2023-09-19 LAB — POCT GLUCOSE: 105 MG/DL (ref 70–110)

## 2023-09-19 PROCEDURE — 37000008 HC ANESTHESIA 1ST 15 MINUTES: Performed by: OTOLARYNGOLOGY

## 2023-09-19 PROCEDURE — 30140 RESECT INFERIOR TURBINATE: CPT | Mod: 50,51,, | Performed by: OTOLARYNGOLOGY

## 2023-09-19 PROCEDURE — 71000015 HC POSTOP RECOV 1ST HR: Performed by: OTOLARYNGOLOGY

## 2023-09-19 PROCEDURE — 30140 PR EXCISION TURBINATE,SUBMUCOUS: ICD-10-PCS | Mod: 50,51,, | Performed by: OTOLARYNGOLOGY

## 2023-09-19 PROCEDURE — 27201423 OPTIME MED/SURG SUP & DEVICES STERILE SUPPLY: Performed by: OTOLARYNGOLOGY

## 2023-09-19 PROCEDURE — 30520 REPAIR OF NASAL SEPTUM: CPT | Mod: ,,, | Performed by: OTOLARYNGOLOGY

## 2023-09-19 PROCEDURE — 25000003 PHARM REV CODE 250: Performed by: OTOLARYNGOLOGY

## 2023-09-19 PROCEDURE — 37000009 HC ANESTHESIA EA ADD 15 MINS: Performed by: OTOLARYNGOLOGY

## 2023-09-19 PROCEDURE — 63600175 PHARM REV CODE 636 W HCPCS: Performed by: OTOLARYNGOLOGY

## 2023-09-19 PROCEDURE — D9220A PRA ANESTHESIA: Mod: ANES,,, | Performed by: ANESTHESIOLOGY

## 2023-09-19 PROCEDURE — 71000033 HC RECOVERY, INTIAL HOUR: Performed by: OTOLARYNGOLOGY

## 2023-09-19 PROCEDURE — 36000707: Performed by: OTOLARYNGOLOGY

## 2023-09-19 PROCEDURE — 63600175 PHARM REV CODE 636 W HCPCS: Performed by: STUDENT IN AN ORGANIZED HEALTH CARE EDUCATION/TRAINING PROGRAM

## 2023-09-19 PROCEDURE — 30520 PR REPAIR, NASAL SEPTUM: ICD-10-PCS | Mod: ,,, | Performed by: OTOLARYNGOLOGY

## 2023-09-19 PROCEDURE — 25000003 PHARM REV CODE 250: Performed by: ANESTHESIOLOGY

## 2023-09-19 PROCEDURE — D9220A PRA ANESTHESIA: Mod: CRNA,,, | Performed by: STUDENT IN AN ORGANIZED HEALTH CARE EDUCATION/TRAINING PROGRAM

## 2023-09-19 PROCEDURE — 63600175 PHARM REV CODE 636 W HCPCS: Performed by: ANESTHESIOLOGY

## 2023-09-19 PROCEDURE — 82962 GLUCOSE BLOOD TEST: CPT | Performed by: OTOLARYNGOLOGY

## 2023-09-19 PROCEDURE — 36000706: Performed by: OTOLARYNGOLOGY

## 2023-09-19 PROCEDURE — D9220A PRA ANESTHESIA: ICD-10-PCS | Mod: ANES,,, | Performed by: ANESTHESIOLOGY

## 2023-09-19 PROCEDURE — 25000003 PHARM REV CODE 250: Performed by: STUDENT IN AN ORGANIZED HEALTH CARE EDUCATION/TRAINING PROGRAM

## 2023-09-19 PROCEDURE — D9220A PRA ANESTHESIA: ICD-10-PCS | Mod: CRNA,,, | Performed by: STUDENT IN AN ORGANIZED HEALTH CARE EDUCATION/TRAINING PROGRAM

## 2023-09-19 RX ORDER — EPHEDRINE SULFATE 50 MG/ML
INJECTION, SOLUTION INTRAVENOUS
Status: DISCONTINUED | OUTPATIENT
Start: 2023-09-19 | End: 2023-09-19

## 2023-09-19 RX ORDER — CEPHALEXIN 500 MG/1
500 CAPSULE ORAL EVERY 12 HOURS
Qty: 14 CAPSULE | Refills: 0 | Status: SHIPPED | OUTPATIENT
Start: 2023-09-19 | End: 2023-09-26

## 2023-09-19 RX ORDER — LIDOCAINE HYDROCHLORIDE AND EPINEPHRINE 20; 10 MG/ML; UG/ML
INJECTION, SOLUTION INFILTRATION; PERINEURAL
Status: DISCONTINUED | OUTPATIENT
Start: 2023-09-19 | End: 2023-09-19 | Stop reason: HOSPADM

## 2023-09-19 RX ORDER — MIDAZOLAM HYDROCHLORIDE 1 MG/ML
INJECTION, SOLUTION INTRAMUSCULAR; INTRAVENOUS
Status: DISCONTINUED | OUTPATIENT
Start: 2023-09-19 | End: 2023-09-19

## 2023-09-19 RX ORDER — SODIUM CHLORIDE 0.9 % (FLUSH) 0.9 %
10 SYRINGE (ML) INJECTION
Status: DISCONTINUED | OUTPATIENT
Start: 2023-09-19 | End: 2023-09-19 | Stop reason: HOSPADM

## 2023-09-19 RX ORDER — ROCURONIUM BROMIDE 10 MG/ML
INJECTION, SOLUTION INTRAVENOUS
Status: DISCONTINUED | OUTPATIENT
Start: 2023-09-19 | End: 2023-09-19

## 2023-09-19 RX ORDER — OXYMETAZOLINE HCL 0.05 %
2 SPRAY, NON-AEROSOL (ML) NASAL 2 TIMES DAILY
Status: DISCONTINUED | OUTPATIENT
Start: 2023-09-19 | End: 2023-09-19 | Stop reason: HOSPADM

## 2023-09-19 RX ORDER — ONDANSETRON 2 MG/ML
4 INJECTION INTRAMUSCULAR; INTRAVENOUS DAILY PRN
Status: DISCONTINUED | OUTPATIENT
Start: 2023-09-19 | End: 2023-09-19 | Stop reason: HOSPADM

## 2023-09-19 RX ORDER — LIDOCAINE HYDROCHLORIDE 10 MG/ML
1 INJECTION, SOLUTION EPIDURAL; INFILTRATION; INTRACAUDAL; PERINEURAL ONCE
Status: DISCONTINUED | OUTPATIENT
Start: 2023-09-19 | End: 2023-09-19 | Stop reason: SDUPTHER

## 2023-09-19 RX ORDER — FENTANYL CITRATE 50 UG/ML
INJECTION, SOLUTION INTRAMUSCULAR; INTRAVENOUS
Status: DISCONTINUED | OUTPATIENT
Start: 2023-09-19 | End: 2023-09-19

## 2023-09-19 RX ORDER — ONDANSETRON 2 MG/ML
INJECTION INTRAMUSCULAR; INTRAVENOUS
Status: DISCONTINUED | OUTPATIENT
Start: 2023-09-19 | End: 2023-09-19

## 2023-09-19 RX ORDER — PROCHLORPERAZINE EDISYLATE 5 MG/ML
5 INJECTION INTRAMUSCULAR; INTRAVENOUS EVERY 30 MIN PRN
Status: DISCONTINUED | OUTPATIENT
Start: 2023-09-19 | End: 2023-09-19 | Stop reason: HOSPADM

## 2023-09-19 RX ORDER — LIDOCAINE HYDROCHLORIDE 20 MG/ML
INJECTION INTRAVENOUS
Status: DISCONTINUED | OUTPATIENT
Start: 2023-09-19 | End: 2023-09-19

## 2023-09-19 RX ORDER — HYDROCODONE BITARTRATE AND ACETAMINOPHEN 5; 325 MG/1; MG/1
1 TABLET ORAL EVERY 6 HOURS PRN
Status: DISCONTINUED | OUTPATIENT
Start: 2023-09-19 | End: 2023-09-19 | Stop reason: HOSPADM

## 2023-09-19 RX ORDER — LIDOCAINE HYDROCHLORIDE 10 MG/ML
1 INJECTION, SOLUTION EPIDURAL; INFILTRATION; INTRACAUDAL; PERINEURAL ONCE
Status: DISCONTINUED | OUTPATIENT
Start: 2023-09-19 | End: 2023-09-19 | Stop reason: HOSPADM

## 2023-09-19 RX ORDER — OXYMETAZOLINE HCL 0.05 %
SPRAY, NON-AEROSOL (ML) NASAL
Status: DISCONTINUED | OUTPATIENT
Start: 2023-09-19 | End: 2023-09-19 | Stop reason: HOSPADM

## 2023-09-19 RX ORDER — PHENYLEPHRINE HYDROCHLORIDE 10 MG/ML
INJECTION INTRAVENOUS
Status: DISCONTINUED | OUTPATIENT
Start: 2023-09-19 | End: 2023-09-19

## 2023-09-19 RX ORDER — HYDROMORPHONE HYDROCHLORIDE 2 MG/ML
0.2 INJECTION, SOLUTION INTRAMUSCULAR; INTRAVENOUS; SUBCUTANEOUS EVERY 5 MIN PRN
Status: DISCONTINUED | OUTPATIENT
Start: 2023-09-19 | End: 2023-09-19 | Stop reason: HOSPADM

## 2023-09-19 RX ORDER — PROPOFOL 10 MG/ML
VIAL (ML) INTRAVENOUS
Status: DISCONTINUED | OUTPATIENT
Start: 2023-09-19 | End: 2023-09-19

## 2023-09-19 RX ORDER — SODIUM CHLORIDE, SODIUM LACTATE, POTASSIUM CHLORIDE, CALCIUM CHLORIDE 600; 310; 30; 20 MG/100ML; MG/100ML; MG/100ML; MG/100ML
INJECTION, SOLUTION INTRAVENOUS CONTINUOUS
Status: DISCONTINUED | OUTPATIENT
Start: 2023-09-19 | End: 2023-09-19 | Stop reason: HOSPADM

## 2023-09-19 RX ORDER — CEFAZOLIN SODIUM 2 G/50ML
2 SOLUTION INTRAVENOUS
Status: COMPLETED | OUTPATIENT
Start: 2023-09-19 | End: 2023-09-19

## 2023-09-19 RX ORDER — DEXAMETHASONE SODIUM PHOSPHATE 4 MG/ML
INJECTION, SOLUTION INTRA-ARTICULAR; INTRALESIONAL; INTRAMUSCULAR; INTRAVENOUS; SOFT TISSUE
Status: DISCONTINUED | OUTPATIENT
Start: 2023-09-19 | End: 2023-09-19

## 2023-09-19 RX ORDER — HYDROCODONE BITARTRATE AND ACETAMINOPHEN 5; 325 MG/1; MG/1
1 TABLET ORAL EVERY 6 HOURS PRN
Qty: 20 TABLET | Refills: 0 | Status: SHIPPED | OUTPATIENT
Start: 2023-09-19

## 2023-09-19 RX ORDER — ACETAMINOPHEN 500 MG
1000 TABLET ORAL
Status: COMPLETED | OUTPATIENT
Start: 2023-09-19 | End: 2023-09-19

## 2023-09-19 RX ORDER — ONDANSETRON 8 MG/1
8 TABLET, ORALLY DISINTEGRATING ORAL EVERY 12 HOURS PRN
Qty: 10 TABLET | Refills: 0 | Status: SHIPPED | OUTPATIENT
Start: 2023-09-19

## 2023-09-19 RX ADMIN — PHENYLEPHRINE HYDROCHLORIDE 100 MCG: 10 INJECTION INTRAVENOUS at 08:09

## 2023-09-19 RX ADMIN — SODIUM CHLORIDE, SODIUM LACTATE, POTASSIUM CHLORIDE, AND CALCIUM CHLORIDE: .6; .31; .03; .02 INJECTION, SOLUTION INTRAVENOUS at 08:09

## 2023-09-19 RX ADMIN — Medication 2 SPRAY: at 09:09

## 2023-09-19 RX ADMIN — EPHEDRINE SULFATE 10 MG: 50 INJECTION INTRAVENOUS at 07:09

## 2023-09-19 RX ADMIN — PROPOFOL 180 MG: 10 INJECTION, EMULSION INTRAVENOUS at 07:09

## 2023-09-19 RX ADMIN — PROPOFOL 20 MG: 10 INJECTION, EMULSION INTRAVENOUS at 07:09

## 2023-09-19 RX ADMIN — ACETAMINOPHEN 1000 MG: 500 TABLET ORAL at 05:09

## 2023-09-19 RX ADMIN — ONDANSETRON 4 MG: 2 INJECTION, SOLUTION INTRAMUSCULAR; INTRAVENOUS at 09:09

## 2023-09-19 RX ADMIN — FENTANYL CITRATE 100 MCG: 50 INJECTION, SOLUTION INTRAMUSCULAR; INTRAVENOUS at 07:09

## 2023-09-19 RX ADMIN — SODIUM CHLORIDE, SODIUM LACTATE, POTASSIUM CHLORIDE, AND CALCIUM CHLORIDE: .6; .31; .03; .02 INJECTION, SOLUTION INTRAVENOUS at 07:09

## 2023-09-19 RX ADMIN — ROCURONIUM BROMIDE 50 MG: 10 INJECTION, SOLUTION INTRAVENOUS at 07:09

## 2023-09-19 RX ADMIN — EPHEDRINE SULFATE 5 MG: 50 INJECTION INTRAVENOUS at 07:09

## 2023-09-19 RX ADMIN — PHENYLEPHRINE HYDROCHLORIDE 50 MCG: 10 INJECTION INTRAVENOUS at 08:09

## 2023-09-19 RX ADMIN — CEFAZOLIN SODIUM 2 G: 2 SOLUTION INTRAVENOUS at 07:09

## 2023-09-19 RX ADMIN — HYDROMORPHONE HYDROCHLORIDE 0.2 MG: 2 INJECTION, SOLUTION INTRAMUSCULAR; INTRAVENOUS; SUBCUTANEOUS at 09:09

## 2023-09-19 RX ADMIN — DEXAMETHASONE SODIUM PHOSPHATE 8 MG: 4 INJECTION, SOLUTION INTRAMUSCULAR; INTRAVENOUS at 07:09

## 2023-09-19 RX ADMIN — MIDAZOLAM HYDROCHLORIDE 2 MG: 1 INJECTION, SOLUTION INTRAMUSCULAR; INTRAVENOUS at 07:09

## 2023-09-19 RX ADMIN — PROPOFOL 50 MG: 10 INJECTION, EMULSION INTRAVENOUS at 08:09

## 2023-09-19 RX ADMIN — SUGAMMADEX 200 MG: 100 INJECTION, SOLUTION INTRAVENOUS at 09:09

## 2023-09-19 RX ADMIN — ROCURONIUM BROMIDE 20 MG: 10 INJECTION, SOLUTION INTRAVENOUS at 08:09

## 2023-09-19 RX ADMIN — GLYCOPYRROLATE 0.2 MG: 0.2 INJECTION, SOLUTION INTRAMUSCULAR; INTRAVITREAL at 07:09

## 2023-09-19 RX ADMIN — LIDOCAINE HYDROCHLORIDE 100 MG: 20 INJECTION, SOLUTION INTRAVENOUS at 07:09

## 2023-09-19 NOTE — OR NURSING
Patient awake, alert, oriented x4, denies nausea, pain well managed with dilaudid, dressing dry and intact, extremity elevated and ice applied, VSS, pleasant and hopeful, meets criteria for release from PACU.

## 2023-09-19 NOTE — BRIEF OP NOTE
[facility] [place]  Brief Operative Note    Surgery Date: 9/19/2023     Surgeon(s) and Role:     * Ann Gross MD - Primary    Assisting Surgeon: None    Pre-op Diagnosis:  BARAK on CPAP [G47.33, Z99.89]  Deviated nasal septum [J34.2]  Nasal turbinate hypertrophy [J34.3]    Post-op Diagnosis:  Post-Op Diagnosis Codes:     * BARAK on CPAP [G47.33, Z99.89]     * Deviated nasal septum [J34.2]     * Nasal turbinate hypertrophy [J34.3]    Procedure(s) (LRB):  SEPTOPLASTY, NOSE (N/A)  REDUCTION, NASAL TURBINATE (Bilateral)    Anesthesia: General    Operative Findings: severely deviated septum to left ; turbinate hypertrophy    Estimated Blood Loss:50 cc         Specimens:   Specimen (24h ago, onward)      None              Discharge Note    OUTCOME: Patient tolerated treatment/procedure well without complication and will be discharged when meets pacu criteria.    DISPOSITION: Home or Self Care    FINAL DIAGNOSIS: BARAK, deviated septum and turbinate hypertrophy    FOLLOWUP: In clinic    DISCHARGE INSTRUCTIONS:    Discharge Procedure Orders   Other restrictions (specify):   Order Comments: No nose blowing, do not bend at waist. No lifting over 5 pounds     Notify your health care provider if you experience any of the following:  redness, tenderness, or signs of infection (pain, swelling, redness, odor or green/yellow discharge around incision site)     Notify your health care provider if you experience any of the following:  severe uncontrolled pain     Change dressing (specify)   Order Comments: Change dressing as needed for saturation

## 2023-09-19 NOTE — DISCHARGE INSTRUCTIONS
INSTRUCTIONS TO FOLLOW AFTER SINUS AND NASAL SURGERY  DR. WINCHESTER- [facility]      Office hours:  Weekdays 8:00 am to 5:00 pm.  Please call [phone] and ask to speak with one of her medical assistant, Nereida  at the [facility].    After-hours & weekends:  Please call [phone] and ask to speak with the ENT resident doctor.    Your first office visit with Dr. Winchester after surgery should have been already scheduled.  If you don't know when it is, call our office at [phone] and we can help you schedule the appointment or notify you of the scheduled time. Normally there will be a follow-up visit one week after surgery, and another approximately three weeks after surgery.     Please call IMMMEDIATELY if you have:  Temperature of 101° F or greater  Any unusual, painful swelling  Any active bleeding that saturates more than a 4x4 gauze FREQUENTLY that requires changing every 5-10 minutes due to saturation  Any thick drainage green or yellow drainage  Changes in vision or swelling around the eye  Pain not relieved by your prescribed pain medication    ACTIVITY:  Sleep on your back with the head of the bead elevated, up on 2-3 pillows, or in a recliner for the first 3 to 5 days. This will help with swelling.     After surgery you will have a lot of nasal drainage and difficulty breathing through the nose. This is normal. You may breathe through your nose after surgery, you will feel very congested because of swelling and absorbable packing placed in the nose. Nasal breathing usually improves significantly after your first visit after surgery when this material is removed.     If you use CPAP or BiPAP to sleep at night, you should wait at least 48 hours before resuming use.      hold aspirin for 48 hours prior to restarting      You may shower after surgery.    RESTRICTED ACTIVITIES AFTER SURGERY:  Do NOT blow your nose for 2 weeks. If you have to sneeze or cough, do so with your mouth  open. Allow drainage from nose to fall on a mustache dressing (gauze placed under nose) and change it as needed, or gently wipe outside of nose without blowing. Use saline rinse if you feel the need to blow your nose. You can blow very very gently if saline rinse does not help   AVOID all heavy lifting, straining or bending for 2 weeks.   AVOID semi-contact sports or vigorous exercising for 3-4 weeks. Dr. Gross will let you know when you are cleared to resume exercise.  AVOID flying or swimming for 2 weeks.    Do NOT operate a motor vehicle or any type of heavy machinery within 24 hours of taking pain medication.  DO NOT smoke or be around smokers.  AVOID irritating substances that might make you sneeze, such as dust, chalk, harsh chemicals, and allergic triggers.  This might also include spicy foods.    DRESSINGS:  Change the gauze mustache dressing under your nose as needed if one was placed. You will likely have pinkish-red drainage for 2-3 days, small to medium amounts of bleeding will occur until the nose heals, and this is normal; you should notify us if there is significant bleeding that is not slowing down over a 24 hour time period. If you have active bleeding in the nose spray generous amounts of afrin in the nose and hold pressure over the front part of the nose for 15 minutes ( see below for description of afrin spray). If bleeding continues after afrin sprayed and holding pressure for 15 minutes, repeat afrin spray in both sides of the nose and hold pressure again.     The nose should be kept moist with nasal saline spray and irrigations to allow it to heal and prevent crusting and scabs from forming which can impair optimal healing and make it more difficult to easily remove the packing/splints at your visit after surgery. You may notice small fragments of materials placed as packing/dressings come out of your nose in the weeks following surgery; this is not problematic if parts of these materials  come out. If nasal septum stents were placed you may see the front portions of them inside your nose on both sides with a suture holding them in place inside the front of your nose, these are planned to be removed at the first visit approximately one week after surgery.    MEDICATIONS:  After surgery, you will be sent home with prescriptions for:    [ X ] Pain medication - take as needed for more severe pain, but Tylenol and ibuprofen is usually sufficient to control most of the pain related to surgery    [  X] Anti-nausea medication - for use as needed after surgery    [  X] Antibiotics - take all of this medication as instructed until completed.         Most people need pain medication for the first few days after surgery, although a narcotic is rarely necessary.  The best pain control comes from a combination of ACETAMINOPHEN (Tylenol) and IBUPROFEN (Motrin).  You can use any over-the-counter versions of acetaminophen and ibuprofen.  You should take 400 milligrams of ibuprofen every 6 hours and 650 milligrams of tylenol every 6 hours. These can be alternated so that you are taking something about every 3 hours while awake. If you need the narcotic pain medication, substitute this out for the acetaminophen. You should try to only use the narcotic medication if you are having trouble sleeping.     Some people have problems with bowel movements after surgery. If you have NOT had a bowel movement 3-5 days after surgery, go to your local pharmacy and purchase an over the counter stool softener such as COLACE. You can also ask the pharmacist for his or her recommendation. If you still do not have a bowel movement after starting the softener, please call the office. Antibiotics can also upset your stomach and cause nausea, diarrhea, and/or constipation by reducing the good bacteria in your gut. Replenish the good bacteria by consuming PROBIOTICS either as supplement pills, eating probiotic yogurt, or drinking kefir  yogurt drink. Look for terms like live and active cultures or live probiotics on the product - a common brand of probiotic yogurt is Mariposa's.        You will need these over-the-counter medications after surgery:  SALINE NASAL SPRAY (any brand, such as ocean saline spray or simply saline): Begin using nasal saline spray at discharge, 3 sprays on each side every 3 hours while awake to keep the nose moist and help the packing material stay moist and gently dissolve. This also helps reduce crusting and material from accumulating while not blowing your nose. Gently wipe away any drainage and discharge from the front of the nose. Keeping the packing moist will also help remove this at your first visit. Examples of nasal saline spray:      SALINE SINUS RINSE ([employer] brand): You can use this saline sinus irrigation to rinse out your nose and sinuses after surgery.  Begin doing this the day after surgery. You can use the above spray immediately after surgery but if you would like to do a more intense wash, you can start this 24 hours after you have been discharged. You will notice limited flow through your nose while packing  in place but it is still important to use saline rinses to keep the nose clear and moist. Irrigations will be more effective after your first post-operative visit when material partially blocking the nose is removed. You should do a full bottle, half on one side of your nose and half on the other, 2 times per day (or more if able to, you cannot do this too much). These are very important to allow the nose to heal well, remove debris, and reduce risk of unwanted scar tissue from forming. Follow the instructions on the box: mix the salt packet with clean water (bottle, previously boiled, distilled, etc -- not tap water) to the line on the bottle to make the irrigation.      AFRIN (regular strength): Only use if you have nasal congestion or bleeding. Use 2 times per day for 3 days, stop for 1 day,  continue 2 times per day for 3 days, then stop completely. This is very successful at resolving minor nose bleeds following surgery. The generic drug name for Afrin is oxymetazoline, and it is sold as a nasal decongestant.  NOTE:  You may not need to do this at all.    DIET:  Avoid hot and spicy foods for 1 week after surgery.  Begin with bland foods the evening after surgery and advance to your regular diet as tolerated.  It is not necessary to take only soft food unless you are recovering from tonsil surgery.  Drink plenty of fluids (water is best).   Avoid alcoholic and caffeinated beverages for 1 week after surgery because they can cause you to become dehydrated.      Following these instructions, remembering to do the sinus irrigations, and returning for your follow-up visits after surgery will make sure you have the best possible result. Wishing you a quick and easy recovery from surgery, and looking forward to your improvements.     Ann Gross MD  Department of Otorhinolaryngology              Fall Prevention  Millions of people fall every year and injure themselves. You may have had anesthesia or sedation which may increase your risk of falling. You may have health issues that put you at an increased risk of falling.     Here are ways to reduce your risk of falling.    Make your home safe by keeping walkways clear of objects you may trip over.  Use non-slip pads under rugs. Do not use area rugs or small throw rugs.  Use non-slip mats in bathtubs and showers.  Install handrails and lights on staircases.  Do not walk in poorly lit areas.  Do not stand on chairs or wobbly ladders.  Use caution when reaching overhead or looking upward. This position can cause a loss of balance.  Be sure your shoes fit properly, have non-slip bottoms and are in good condition.   Wear shoes both inside and out. Avoid going barefoot or wearing slippers.  Be cautious when going up and down stairs, curbs, and when walking on  uneven sidewalks.  If your balance is poor, consider using a cane or walker.  If your fall was related to alcohol use, stop or limit alcohol intake.   If your fall was related to use of sleeping medicines, talk to your doctor about this. You may need to reduce your dosage at bedtime if you awaken during the night to go to the bathroom.    To reduce the need for nighttime bathroom trips:  Avoid drinking fluids for several hours before going to bed  Empty your bladder before going to bed  Men can keep a urinal at the bedside  Stay as active as you can. Balance, flexibility, strength, and endurance all come from exercise. They all play a role in preventing falls. Ask your healthcare provider which types of activity are right for you.  Get your vision checked on a regular basis.  If you have pets, know where they are before you stand up or walk so you don't trip over them.  Use night lights.

## 2023-09-19 NOTE — TRANSFER OF CARE
Anesthesia Transfer of Care Note    Patient: Stef Pool    Procedure(s) Performed: Procedure(s) (LRB):  SEPTOPLASTY, NOSE (N/A)  REDUCTION, NASAL TURBINATE (Bilateral)    Patient location: PACU    Anesthesia Type: general    Transport from OR: Transported from OR on 6-10 L/min O2 by face mask with adequate spontaneous ventilation    Post pain: adequate analgesia    Post assessment: no apparent anesthetic complications and tolerated procedure well    Post vital signs: stable    Level of consciousness: sedated    Nausea/Vomiting: no nausea/vomiting    Complications: none    Transfer of care protocol was followed      Last vitals:   Visit Vitals  BP (!) 107/56 (BP Location: Left arm, Patient Position: Lying)   Pulse 71   Temp 37.3 °C (99.1 °F) (Temporal)   Resp 17   Wt 103.6 kg (228 lb 6 oz)   SpO2 97%   BMI 32.77 kg/m²

## 2023-09-19 NOTE — H&P
[facility]  Otorhinolaryngology-Head & Neck Surgery  History & Physical    Patient Name: Stef Pool  MRN: [id number]  Admission Date: 9/19/2023  Attending Physician: Ann Gross MD  Primary Care Provider: Caitlyn Fong MD    Subjective:     Chief Complaint/Reason for Admission: surgery    History of Present Illness:  67 y.o. male presents with BARAK and difficulty tolerating cpap. Also with nasal congestion refractory to medication sprays ( has been on saline, flonase and astelin). Here today for surgery to address turbinate hypertrophy and deviated septum .    No current facility-administered medications on file prior to encounter.     Current Outpatient Medications on File Prior to Encounter   Medication Sig    ALPRAZolam (XANAX) 0.5 MG tablet Take 1 tablet (0.5 mg total) by mouth every evening.    atorvastatin (LIPITOR) 40 MG tablet TAKE 1 TABLET BY MOUTH EVERY DAY    azelastine (ASTELIN) 137 mcg (0.1 %) nasal spray 1 spray (137 mcg total) by Nasal route 2 (two) times daily.    cholecalciferol, vitamin D3, (VITAMIN D3) 50 mcg (2,000 unit) Cap capsule TAKE 1 CAPSULE (2,000 UNITS TOTAL) BY MOUTH ONCE DAILY.    ergocalciferol (ERGOCALCIFEROL) 50,000 unit Cap TAKE 1 CAPSULE BY MOUTH ONE TIME PER WEEK    fluticasone propionate (FLONASE) 50 mcg/actuation nasal spray INSTILL 2 SPRAYS (100 MCG TOTAL) BY EACH NOSTRIL ROUTE ONCE DAILY.    nicotine (NICODERM CQ) 14 mg/24 hr Place 1 patch onto the skin once daily.    sildenafiL (VIAGRA) 100 MG tablet Take 1 tablet (100 mg total) by mouth daily as needed for Erectile Dysfunction.    aspirin 81 MG Chew Take 1 tablet (81 mg total) by mouth once daily.    fluticasone propionate (FLONASE) 50 mcg/actuation nasal spray 2 sprays (100 mcg total) by Each Nostril route 2 (two) times daily.    nicotine polacrilex 2 MG Lozg Take 1 lozenge (2 mg total) by mouth as needed (1-2 per hour in the place of cigarette (5-10 daily max) (mini).       Review of patient's  allergies indicates:  No Known Allergies    Past Medical History:   Diagnosis Date    Anxiety 11/20/2015    Combined hyperlipidemia 11/23/2012    HEARING LOSS     Major depressive disorder, recurrent, moderate 11/23/2012    Obesity (BMI 30.0-34.9) 11/20/2015    diet and exercise changes discussed     BARAK on CPAP 11/20/2015    uses at least 4 hours nightly     Prediabetes 11/23/2016    making dietary and exercise changes and staring Metformin 500 mg XR daily.  recheck in 3 months    Smoker 05/02/2013     Past Surgical History:   Procedure Laterality Date    COLONOSCOPY N/A 7/12/2019    Procedure: COLONOSCOPY;  Surgeon: Amrit Reyes MD;  Location: [facility] ENDO;  Service: Endoscopy;  Laterality: N/A;    SLEEP ENDOSCOPY, DRUG-INDUCED N/A 6/13/2023    Procedure: SLEEP ENDOSCOPY,DRUG-INDUCED;  Surgeon: Ann Gross MD;  Location: [facility] OR;  Service: ENT;  Laterality: N/A;  RN PREOP 06/09/2023 --JM----NEED H/P     Family History       Problem Relation (Age of Onset)    Heart disease Mother          Tobacco Use    Smoking status: Every Day     Current packs/day: 0.50     Average packs/day: 0.5 packs/day for 1 year (0.5 ttl pk-yrs)     Types: Cigarettes    Smokeless tobacco: Never    Tobacco comments:     Pt currently in the program   Substance and Sexual Activity    Alcohol use: Yes    Drug use: No    Sexual activity: Yes     Partners: Female     Review of Systems   Constitutional:  Negative for fever.   HENT:  Positive for congestion.    Respiratory:  Negative for shortness of breath.    Cardiovascular:  Negative for chest pain.   Allergic/Immunologic: Positive for environmental allergies.   Neurological:  Negative for facial asymmetry.     Objective:     Vital Signs (Most Recent):  Temp: 98.1 °F (36.7 °C) (09/19/23 0605)  Pulse: (!) 59 (09/19/23 0605)  Resp: 16 (09/19/23 0605)  BP: (!) 109/57 (09/19/23 0605)  SpO2: 96 % (09/19/23 0605) Vital Signs (24h Range):  Temp:  [98.1 °F (36.7 °C)] 98.1 °F (36.7 °C)  Pulse:   [59] 59  Resp:  [16] 16  SpO2:  [96 %] 96 %  BP: (109)/(57) 109/57     Weight: 103.6 kg (228 lb 6 oz)  Body mass index is 32.77 kg/m².    Physical Exam  HENT:      Head: Normocephalic.      Nose: Septal deviation present.      Right Turbinates: Enlarged.      Left Turbinates: Enlarged.      Mouth/Throat:      Mouth: Mucous membranes are moist.   Neck:      Trachea: Trachea normal.   Pulmonary:      Effort: Pulmonary effort is normal.      Breath sounds: No stridor.   Neurological:      Mental Status: He is alert.         Significant Labs:  CBC:   Recent Labs   Lab 09/12/23  0940   WBC 9.59   RBC 4.98   HGB 14.3   HCT 44.6      MCV 90   MCH 28.7   MCHC 32.1     CMP:   Recent Labs   Lab 09/12/23  0940   *   CALCIUM 9.5   ALBUMIN 3.9   PROT 6.6      K 4.0   CO2 24      BUN 15   CREATININE 1.0   ALKPHOS 121   ALT 27   AST 22   BILITOT 0.3         Assessment/Plan:     Deviated septum   Turbinate hypertrophy  BARAK  VTE Risk Mitigation (From admission, onward)           Ordered     IP VTE LOW RISK PATIENT  Once         09/19/23 0538     Place sequential compression device  Until discontinued         09/19/23 0538                To or for septoplasty and turbinate reduction     Ann Gross MD  Otorhinolaryngology-Head & Neck Surgery  [facility]

## 2023-09-19 NOTE — OP NOTE
[facility]  Surgery Department  Operative Note    SUMMARY     Date of Procedure: 9/19/2023     Procedure: Procedure(s) (LRB):  SEPTOPLASTY, NOSE (N/A)  REDUCTION, NASAL TURBINATE (Bilateral)     Surgeon(s) and Role:     * Ann Gross MD - Primary    Assisting Surgeon: None    Pre-Operative Diagnosis: BARAK on CPAP [G47.33, Z99.89]  Deviated nasal septum [J34.2]  Nasal turbinate hypertrophy [J34.3]    Post-Operative Diagnosis: Post-Op Diagnosis Codes:     * BARAK on CPAP [G47.33, Z99.89]     * Deviated nasal septum [J34.2]     * Nasal turbinate hypertrophy [J34.3]    Anesthesia: General    Operative Findings (including complications, if any): severely deviated septum to left ; turbinate hypertrophy           Operative Report in Detail:   Patient was identified in the holding area and taken to the operating room. The patient was placed in the supine position, and induced under general anesthesia per Anesthesiology. A formal surgical pause was held, and all aspects were addressed correctly identifying patient and procedure. A nasal speculum was inserted into the nose and Injections of local anesthetic using local anesthetic with 1:100,000 epinephrine were performed at the anterior nasal septum and in the nasal cavity bilaterally. Afrin soaked pledgets were inserted into the nasal cavity bilaterally. After adequate time for vasoconstriction, pledgets were removed. Septoplasty was first addressed. An incision was created in the left anterior septum and a left mucoperichondrial flap was elevated using a Mildred elevator. The bony-cartilaginous junction was disarticulated and offending portions of cartilage and bone were resected taking great care to spare dorsal and caudal struts. This was done using a combination of instruments. Excellent improvement in the airway was noted bilaterally.The sinonasal cavities were copiously irrigated with normal saline and all debris was removed.  A 4-0 plain gut suture was  used to quilt suture the mid-anterior nasal septal mucosal flaps, and the anterior incision was closed with 4-0 chromic gut sutures in an interrupted fashion.       Next turbinate reduction was performed. An incision was made at the head of the inferior turbinate on the left side and a carmen elevator was used to lift the mucosa off of the bone. Under endoscopic guidance, the turbinate shaver was inserted through the incision to perform submucosal resection of the turbinate. Once adequate improvement of the airway was noted from resection, the butter knife was used to outfracture the inferior turbinate. The same procedure was then repeated on the right side. Intranasal silicone septal splints (He Open Lumen Splint) were placed bilaterally and secured anteriorly with a prolene suture tied within the left nares.  An orogastric tube was passed into the stomach to suction out any blood.    The patient was handed back over to anesthesia for awakening. There were no complications. I was present for entire surgery and performed entire surgery.      Significant Surgical Tasks Conducted by the Assistant(s), if Applicable: none    Estimated Blood Loss:50 cc           Implants:no implant ; he splints placed    Specimens:   Specimen (24h ago, onward)      None                    Condition: Good    Disposition: PACU - hemodynamically stable.    Attestation: I was present and scrubbed for the entire procedure.

## 2023-09-19 NOTE — ANESTHESIA PROCEDURE NOTES
Intubation    Date/Time: 9/19/2023 7:25 AM    Performed by: Stella Alejandro CRNA  Authorized by: Raman Park MD    Intubation:     Induction:  Intravenous    Intubated:  Postinduction    Mask Ventilation:  Moderately difficult with oral airway    Attempts:  1    Attempted By:  Student    Method of Intubation:  Video laryngoscopy    Blade:  Burrell 3    Laryngeal View Grade: Grade I - full view of cords      Difficult Airway Encountered?: No      Complications:  None    Airway Device:  Oral endotracheal tube    Airway Device Size:  7.5    Style/Cuff Inflation:  Cuffed (inflated to minimal occlusive pressure)    Inflation Amount (mL):  7    Tube secured:  23    Secured at:  The lips    Placement Verified By:  Capnometry    Complicating Factors:  None    Findings Post-Intubation:  Atraumatic/condition of teeth unchanged and BS equal bilateral

## 2023-09-20 NOTE — ANESTHESIA POSTPROCEDURE EVALUATION
Anesthesia Post Evaluation    Patient: Stef Pool    Procedure(s) Performed: Procedure(s) (LRB):  SEPTOPLASTY, NOSE (N/A)  REDUCTION, NASAL TURBINATE (Bilateral)    Final Anesthesia Type: general      Patient location during evaluation: PACU  Patient participation: Yes- Able to Participate  Level of consciousness: awake and alert and oriented  Post-procedure vital signs: reviewed and stable  Pain management: adequate  Airway patency: patent    PONV status at discharge: No PONV  Anesthetic complications: no      Cardiovascular status: hemodynamically stable and blood pressure returned to baseline  Respiratory status: spontaneous ventilation, room air and unassisted  Hydration status: euvolemic  Follow-up not needed.          Vitals Value Taken Time   /57 09/19/23 1117   Temp 36.6 °C (97.8 °F) 09/19/23 1117   Pulse 62 09/19/23 1117   Resp 19 09/19/23 1117   SpO2 95 % 09/19/23 1117         Event Time   Out of Recovery 11:15:15         Pain/Jonathan Score: Pain Rating Prior to Med Admin: 2 (9/19/2023 11:50 AM)  Pain Rating Post Med Admin: 3 (9/19/2023 11:05 AM)  Jonathan Score: 10 (9/19/2023 11:50 AM)

## 2023-09-25 ENCOUNTER — OFFICE VISIT (OUTPATIENT)
Dept: OTOLARYNGOLOGY | Facility: CLINIC | Age: 68
End: 2023-09-25
Payer: MEDICARE

## 2023-09-25 VITALS
BODY MASS INDEX: 31.97 KG/M2 | WEIGHT: 223.31 LBS | SYSTOLIC BLOOD PRESSURE: 125 MMHG | HEIGHT: 70 IN | DIASTOLIC BLOOD PRESSURE: 68 MMHG

## 2023-09-25 DIAGNOSIS — Z98.890 S/P NASAL SEPTOPLASTY: Primary | ICD-10-CM

## 2023-09-25 PROCEDURE — 99024 PR POST-OP FOLLOW-UP VISIT: ICD-10-PCS | Mod: S$GLB,,, | Performed by: OTOLARYNGOLOGY

## 2023-09-25 PROCEDURE — 3288F FALL RISK ASSESSMENT DOCD: CPT | Mod: CPTII,S$GLB,, | Performed by: OTOLARYNGOLOGY

## 2023-09-25 PROCEDURE — 3044F PR MOST RECENT HEMOGLOBIN A1C LEVEL <7.0%: ICD-10-PCS | Mod: CPTII,S$GLB,, | Performed by: OTOLARYNGOLOGY

## 2023-09-25 PROCEDURE — 3074F SYST BP LT 130 MM HG: CPT | Mod: CPTII,S$GLB,, | Performed by: OTOLARYNGOLOGY

## 2023-09-25 PROCEDURE — 1126F PR PAIN SEVERITY QUANTIFIED, NO PAIN PRESENT: ICD-10-PCS | Mod: CPTII,S$GLB,, | Performed by: OTOLARYNGOLOGY

## 2023-09-25 PROCEDURE — 3044F HG A1C LEVEL LT 7.0%: CPT | Mod: CPTII,S$GLB,, | Performed by: OTOLARYNGOLOGY

## 2023-09-25 PROCEDURE — 1101F PT FALLS ASSESS-DOCD LE1/YR: CPT | Mod: CPTII,S$GLB,, | Performed by: OTOLARYNGOLOGY

## 2023-09-25 PROCEDURE — 1101F PR PT FALLS ASSESS DOC 0-1 FALLS W/OUT INJ PAST YR: ICD-10-PCS | Mod: CPTII,S$GLB,, | Performed by: OTOLARYNGOLOGY

## 2023-09-25 PROCEDURE — 3288F PR FALLS RISK ASSESSMENT DOCUMENTED: ICD-10-PCS | Mod: CPTII,S$GLB,, | Performed by: OTOLARYNGOLOGY

## 2023-09-25 PROCEDURE — 3008F BODY MASS INDEX DOCD: CPT | Mod: CPTII,S$GLB,, | Performed by: OTOLARYNGOLOGY

## 2023-09-25 PROCEDURE — 1126F AMNT PAIN NOTED NONE PRSNT: CPT | Mod: CPTII,S$GLB,, | Performed by: OTOLARYNGOLOGY

## 2023-09-25 PROCEDURE — 3078F PR MOST RECENT DIASTOLIC BLOOD PRESSURE < 80 MM HG: ICD-10-PCS | Mod: CPTII,S$GLB,, | Performed by: OTOLARYNGOLOGY

## 2023-09-25 PROCEDURE — 99024 POSTOP FOLLOW-UP VISIT: CPT | Mod: S$GLB,,, | Performed by: OTOLARYNGOLOGY

## 2023-09-25 PROCEDURE — 3078F DIAST BP <80 MM HG: CPT | Mod: CPTII,S$GLB,, | Performed by: OTOLARYNGOLOGY

## 2023-09-25 PROCEDURE — 1159F PR MEDICATION LIST DOCUMENTED IN MEDICAL RECORD: ICD-10-PCS | Mod: CPTII,S$GLB,, | Performed by: OTOLARYNGOLOGY

## 2023-09-25 PROCEDURE — 1159F MED LIST DOCD IN RCRD: CPT | Mod: CPTII,S$GLB,, | Performed by: OTOLARYNGOLOGY

## 2023-09-25 PROCEDURE — 3008F PR BODY MASS INDEX (BMI) DOCUMENTED: ICD-10-PCS | Mod: CPTII,S$GLB,, | Performed by: OTOLARYNGOLOGY

## 2023-09-25 PROCEDURE — 3074F PR MOST RECENT SYSTOLIC BLOOD PRESSURE < 130 MM HG: ICD-10-PCS | Mod: CPTII,S$GLB,, | Performed by: OTOLARYNGOLOGY

## 2023-09-25 RX ORDER — ERGOCALCIFEROL 1.25 MG/1
CAPSULE ORAL
Qty: 12 CAPSULE | Refills: 4 | Status: SHIPPED | OUTPATIENT
Start: 2023-09-25 | End: 2023-11-13 | Stop reason: SDUPTHER

## 2023-09-25 NOTE — PROGRESS NOTES
ENT POSTOP CLINIC NOTE    SUBJECTIVE:   pt here for postop visit s/p septoplasty and turbinate reduction on 9-19 -23. Doing well since procedure.     OBJECTIVE:  General: no acute distress  Head: normocephalic  Nose: splints in place with stitch, removed. Rigid scope performed, healing well   Respiratory: nonlabored , no stridor or stertor    IMPRESSION:   s/p septoplasty and turbinate reduction on 9-19 -23.     PLAN:  Doing well postop. All questions answered.   Not sure if has autopap or if pressure would need adjudstment  Still with some congestion in nose so may have issues   N oflonse for now can start in 2 weeks if still a lot of congestion   Consider inspire if fails capp again   Follow up 4-6 weeks recheck nose    
I have reviewed and confirmed nurses' notes...

## 2023-10-08 ENCOUNTER — PATIENT MESSAGE (OUTPATIENT)
Dept: OTOLARYNGOLOGY | Facility: CLINIC | Age: 68
End: 2023-10-08
Payer: MEDICARE

## 2023-10-20 ENCOUNTER — LAB VISIT (OUTPATIENT)
Dept: LAB | Facility: HOSPITAL | Age: 68
End: 2023-10-20
Payer: MEDICARE

## 2023-10-20 DIAGNOSIS — R73.03 PREDIABETES: ICD-10-CM

## 2023-10-20 DIAGNOSIS — Z79.899 MEDICATION MANAGEMENT: ICD-10-CM

## 2023-10-20 DIAGNOSIS — E78.2 COMBINED HYPERLIPIDEMIA: ICD-10-CM

## 2023-10-20 LAB
ALBUMIN SERPL BCP-MCNC: 3.9 G/DL (ref 3.5–5.2)
ALP SERPL-CCNC: 114 U/L (ref 55–135)
ALT SERPL W/O P-5'-P-CCNC: 25 U/L (ref 10–44)
ANION GAP SERPL CALC-SCNC: 9 MMOL/L (ref 8–16)
AST SERPL-CCNC: 23 U/L (ref 10–40)
BILIRUB SERPL-MCNC: 0.5 MG/DL (ref 0.1–1)
BUN SERPL-MCNC: 17 MG/DL (ref 8–23)
CALCIUM SERPL-MCNC: 9.6 MG/DL (ref 8.7–10.5)
CHLORIDE SERPL-SCNC: 106 MMOL/L (ref 95–110)
CHOLEST SERPL-MCNC: 166 MG/DL (ref 120–199)
CHOLEST/HDLC SERPL: 5.4 {RATIO} (ref 2–5)
CO2 SERPL-SCNC: 25 MMOL/L (ref 23–29)
CREAT SERPL-MCNC: 1.1 MG/DL (ref 0.5–1.4)
EST. GFR  (NO RACE VARIABLE): >60 ML/MIN/1.73 M^2
ESTIMATED AVG GLUCOSE: 123 MG/DL (ref 68–131)
GLUCOSE SERPL-MCNC: 107 MG/DL (ref 70–110)
HBA1C MFR BLD: 5.9 % (ref 4–5.6)
HDLC SERPL-MCNC: 31 MG/DL (ref 40–75)
HDLC SERPL: 18.7 % (ref 20–50)
LDLC SERPL CALC-MCNC: 92.8 MG/DL (ref 63–159)
MAGNESIUM SERPL-MCNC: 2.1 MG/DL (ref 1.6–2.6)
NONHDLC SERPL-MCNC: 135 MG/DL
POTASSIUM SERPL-SCNC: 4.2 MMOL/L (ref 3.5–5.1)
PROT SERPL-MCNC: 6.7 G/DL (ref 6–8.4)
SODIUM SERPL-SCNC: 140 MMOL/L (ref 136–145)
TRIGL SERPL-MCNC: 211 MG/DL (ref 30–150)
VIT B12 SERPL-MCNC: 891 PG/ML (ref 210–950)

## 2023-10-20 PROCEDURE — 83735 ASSAY OF MAGNESIUM: CPT | Performed by: FAMILY MEDICINE

## 2023-10-20 PROCEDURE — 82607 VITAMIN B-12: CPT | Performed by: FAMILY MEDICINE

## 2023-10-20 PROCEDURE — 80053 COMPREHEN METABOLIC PANEL: CPT | Performed by: FAMILY MEDICINE

## 2023-10-20 PROCEDURE — 83036 HEMOGLOBIN GLYCOSYLATED A1C: CPT | Performed by: FAMILY MEDICINE

## 2023-10-20 PROCEDURE — 36415 COLL VENOUS BLD VENIPUNCTURE: CPT | Performed by: FAMILY MEDICINE

## 2023-10-20 PROCEDURE — 80061 LIPID PANEL: CPT | Performed by: FAMILY MEDICINE

## 2023-10-26 PROBLEM — Z98.890 H/O ENDOSCOPIC SINUS SURGERY: Status: ACTIVE | Noted: 2023-10-26

## 2023-10-27 ENCOUNTER — OFFICE VISIT (OUTPATIENT)
Dept: FAMILY MEDICINE | Facility: CLINIC | Age: 68
End: 2023-10-27
Payer: MEDICARE

## 2023-10-27 ENCOUNTER — TELEPHONE (OUTPATIENT)
Dept: ADMINISTRATIVE | Facility: OTHER | Age: 68
End: 2023-10-27
Payer: MEDICARE

## 2023-10-27 VITALS
HEIGHT: 70 IN | OXYGEN SATURATION: 96 % | DIASTOLIC BLOOD PRESSURE: 64 MMHG | WEIGHT: 224 LBS | HEART RATE: 65 BPM | SYSTOLIC BLOOD PRESSURE: 116 MMHG | BODY MASS INDEX: 32.07 KG/M2

## 2023-10-27 DIAGNOSIS — E66.09 CLASS 1 OBESITY DUE TO EXCESS CALORIES WITH SERIOUS COMORBIDITY AND BODY MASS INDEX (BMI) OF 30.0 TO 30.9 IN ADULT: ICD-10-CM

## 2023-10-27 DIAGNOSIS — Z23 NEEDS FLU SHOT: ICD-10-CM

## 2023-10-27 DIAGNOSIS — E78.2 COMBINED HYPERLIPIDEMIA: ICD-10-CM

## 2023-10-27 DIAGNOSIS — Z79.82 LONG-TERM USE OF ASPIRIN THERAPY: ICD-10-CM

## 2023-10-27 DIAGNOSIS — F33.1 MAJOR DEPRESSIVE DISORDER, RECURRENT, MODERATE: ICD-10-CM

## 2023-10-27 DIAGNOSIS — Z12.11 COLON CANCER SCREENING: ICD-10-CM

## 2023-10-27 DIAGNOSIS — Z13.6 ENCOUNTER FOR ABDOMINAL AORTIC ANEURYSM SCREENING: ICD-10-CM

## 2023-10-27 DIAGNOSIS — R73.03 PREDIABETES: Primary | ICD-10-CM

## 2023-10-27 DIAGNOSIS — G47.33 OSA ON CPAP: ICD-10-CM

## 2023-10-27 DIAGNOSIS — F41.9 ANXIETY: ICD-10-CM

## 2023-10-27 DIAGNOSIS — Z12.5 SCREENING FOR MALIGNANT NEOPLASM OF PROSTATE: ICD-10-CM

## 2023-10-27 DIAGNOSIS — I87.2 VENOUS INSUFFICIENCY OF LOWER EXTREMITY, UNSPECIFIED LATERALITY: ICD-10-CM

## 2023-10-27 DIAGNOSIS — Z79.899 MEDICATION MANAGEMENT: ICD-10-CM

## 2023-10-27 DIAGNOSIS — Z98.890 H/O ENDOSCOPIC SINUS SURGERY: ICD-10-CM

## 2023-10-27 DIAGNOSIS — E55.9 VITAMIN D DEFICIENCY: ICD-10-CM

## 2023-10-27 DIAGNOSIS — Z71.6 TOBACCO ABUSE COUNSELING: ICD-10-CM

## 2023-10-27 PROCEDURE — 3288F PR FALLS RISK ASSESSMENT DOCUMENTED: ICD-10-PCS | Mod: CPTII,S$GLB,, | Performed by: FAMILY MEDICINE

## 2023-10-27 PROCEDURE — 3008F PR BODY MASS INDEX (BMI) DOCUMENTED: ICD-10-PCS | Mod: CPTII,S$GLB,, | Performed by: FAMILY MEDICINE

## 2023-10-27 PROCEDURE — 1126F AMNT PAIN NOTED NONE PRSNT: CPT | Mod: CPTII,S$GLB,, | Performed by: FAMILY MEDICINE

## 2023-10-27 PROCEDURE — 1160F PR REVIEW ALL MEDS BY PRESCRIBER/CLIN PHARMACIST DOCUMENTED: ICD-10-PCS | Mod: CPTII,S$GLB,, | Performed by: FAMILY MEDICINE

## 2023-10-27 PROCEDURE — 1126F PR PAIN SEVERITY QUANTIFIED, NO PAIN PRESENT: ICD-10-PCS | Mod: CPTII,S$GLB,, | Performed by: FAMILY MEDICINE

## 2023-10-27 PROCEDURE — 1101F PT FALLS ASSESS-DOCD LE1/YR: CPT | Mod: CPTII,S$GLB,, | Performed by: FAMILY MEDICINE

## 2023-10-27 PROCEDURE — 3078F PR MOST RECENT DIASTOLIC BLOOD PRESSURE < 80 MM HG: ICD-10-PCS | Mod: CPTII,S$GLB,, | Performed by: FAMILY MEDICINE

## 2023-10-27 PROCEDURE — G0008 FLU VACCINE - QUADRIVALENT - ADJUVANTED: ICD-10-PCS | Mod: S$GLB,,, | Performed by: FAMILY MEDICINE

## 2023-10-27 PROCEDURE — 99214 PR OFFICE/OUTPT VISIT, EST, LEVL IV, 30-39 MIN: ICD-10-PCS | Mod: S$GLB,,, | Performed by: FAMILY MEDICINE

## 2023-10-27 PROCEDURE — G0008 ADMIN INFLUENZA VIRUS VAC: HCPCS | Mod: S$GLB,,, | Performed by: FAMILY MEDICINE

## 2023-10-27 PROCEDURE — 3074F PR MOST RECENT SYSTOLIC BLOOD PRESSURE < 130 MM HG: ICD-10-PCS | Mod: CPTII,S$GLB,, | Performed by: FAMILY MEDICINE

## 2023-10-27 PROCEDURE — 90694 FLU VACCINE - QUADRIVALENT - ADJUVANTED: ICD-10-PCS | Mod: S$GLB,,, | Performed by: FAMILY MEDICINE

## 2023-10-27 PROCEDURE — 1160F RVW MEDS BY RX/DR IN RCRD: CPT | Mod: CPTII,S$GLB,, | Performed by: FAMILY MEDICINE

## 2023-10-27 PROCEDURE — 3078F DIAST BP <80 MM HG: CPT | Mod: CPTII,S$GLB,, | Performed by: FAMILY MEDICINE

## 2023-10-27 PROCEDURE — 3008F BODY MASS INDEX DOCD: CPT | Mod: CPTII,S$GLB,, | Performed by: FAMILY MEDICINE

## 2023-10-27 PROCEDURE — 99999 PR PBB SHADOW E&M-EST. PATIENT-LVL V: CPT | Mod: PBBFAC,,, | Performed by: FAMILY MEDICINE

## 2023-10-27 PROCEDURE — 3044F HG A1C LEVEL LT 7.0%: CPT | Mod: CPTII,S$GLB,, | Performed by: FAMILY MEDICINE

## 2023-10-27 PROCEDURE — 99214 OFFICE O/P EST MOD 30 MIN: CPT | Mod: S$GLB,,, | Performed by: FAMILY MEDICINE

## 2023-10-27 PROCEDURE — 1101F PR PT FALLS ASSESS DOC 0-1 FALLS W/OUT INJ PAST YR: ICD-10-PCS | Mod: CPTII,S$GLB,, | Performed by: FAMILY MEDICINE

## 2023-10-27 PROCEDURE — 3288F FALL RISK ASSESSMENT DOCD: CPT | Mod: CPTII,S$GLB,, | Performed by: FAMILY MEDICINE

## 2023-10-27 PROCEDURE — 1159F PR MEDICATION LIST DOCUMENTED IN MEDICAL RECORD: ICD-10-PCS | Mod: CPTII,S$GLB,, | Performed by: FAMILY MEDICINE

## 2023-10-27 PROCEDURE — 90694 VACC AIIV4 NO PRSRV 0.5ML IM: CPT | Mod: S$GLB,,, | Performed by: FAMILY MEDICINE

## 2023-10-27 PROCEDURE — 3074F SYST BP LT 130 MM HG: CPT | Mod: CPTII,S$GLB,, | Performed by: FAMILY MEDICINE

## 2023-10-27 PROCEDURE — 1159F MED LIST DOCD IN RCRD: CPT | Mod: CPTII,S$GLB,, | Performed by: FAMILY MEDICINE

## 2023-10-27 PROCEDURE — 99999 PR PBB SHADOW E&M-EST. PATIENT-LVL V: ICD-10-PCS | Mod: PBBFAC,,, | Performed by: FAMILY MEDICINE

## 2023-10-27 PROCEDURE — 3044F PR MOST RECENT HEMOGLOBIN A1C LEVEL <7.0%: ICD-10-PCS | Mod: CPTII,S$GLB,, | Performed by: FAMILY MEDICINE

## 2023-10-27 NOTE — PATIENT INSTRUCTIONS
FALL 2023 COVID-19 VACCINE WITH RSV VACCINE ADVISED    An order for a colonoscopy has been placed. If you have not been contacted within 5-7 days to schedule this procedure, please call GI scheduling at [phone]- PATIENT SCHEDULING FOR [facility]

## 2023-10-27 NOTE — PROGRESS NOTES
Office Visit    Patient Name: Stef Pool    : 1955  MRN: [id number]    Subjective:  Stef is a 67 y.o. male who presents today for:    Follow-up    Seen for PHYSICAL 23  ENT 23 -BARAK consult: would recommend trying nasal surgery and repeat trial with cpap first. If still unable to tolerate could consider inspire  Skin Check 23    ENT 23: s/p septoplasty and turbinate reduction on .   Doing well postop. All questions answered.   Not sure if has autopap or if pressure would need adjudstment  Still with some congestion in nose so may have issues   No flonse for now can start in 2 weeks if still a lot of congestion   Consider inspire if fails capp again   Follow up 4-6 weeks recheck nose        68 yo male here for lab review with 6 month follow up of conditions including obesity, prediabetes, BARAK on CPAP, hyperlipidemia (with ongoing triglyceride elevation and low HDL), history of tobacco abuse, anxiety and depression (Zoloft 100, Wellbutrin 300XL, and Xanax 0.5 prn).     Seen by Dr Morris 21 re: venous insufficiency/varicose veins:   Compression stockings 20-30 mmHg  Exercise as tolerated    US BLE  22:   Impression:   1. Hemodynamically significant venous reflux within the left GSV and popliteal veins as well as involving bilateral accessory calf veins.   2. There is no evidence of bilateral lower extremity deep venous thrombosis.     He retired from [employer] 2021.    His daughter had a baby boy 2021-- he and his wife have helped with childcare.   He enjoyed an [employer] 2022 and [place].      Labs 10/20/23: normal CMP/B12/Magnesium, LDL 92 and triglycerides improved to 211, A1c increased from 5.6--> 5.9.   Taking Metformin 500 mg XR BID. Vitamin D 58 23 on 50,000 IU D2 Q7days PLUS 2,000 IU D3 DAILY.       He has been feeling over all well.   General mood outlook is good-- helped with daily zoloft +Wellbutrin. Takes  prn xanax for anxiety.   Bladder emptying stable on Flomax.   Smoking Cessation Update: was smoke free since 7/14/19, BUT HE STARTED SMOKING AGAIN IN October 2021. Working extensively with smoking cessation-- currently smoking about 1/2 PPD, quit temporarily after sinus surgery     GENERAL LIFESTYLE:   Diet: FAIR-- drinking fair amount of water and cut out soft drinks (just occasional), a milk daily, limiting sugar in coffee, but he could do better meal prepping to incorporate more vegetables, though better about eating at home.   Exercise: walking about 3 days per week, trying to be more active  Sleep: good-- at least 7 hours nightly-- gets up to go to the bathroom once on average nightly, sleep is much better since sinus surgery  Weight: GENERALLY STABLE IN THE LAST 6 MONTHS AT ABOUT 220-- BMI 32     Screening tests: colonoscopy 7/12/19-- repeat 3 years(7/2022) -ORDERED, PSA 3.6 4/24/23, hep C neg 11/20/2015, HIV (-) 8/7/20     Immunizations: TDaP 5/2/2013, FLU shot 10/26/22--updated today 10/27/23  , SHINGRIX Completed 10/16/20, Pneumovax 23 1/31/2012 (when he was smoking), COVID-19 VACCINE COMPLETED W/ MODERNA BOOSTER 12/22/21- UPDATED FALL 2023 COVID-19 VACCINE WITH RSV VACCINE ADVISED, PREVNAR 20 4/26/23     Eye/Dental: eye [facility] 1/6/23- RTC one year, dental UTD    PAST MEDICAL HISTORY, SURGICAL/SOCIAL/FAMILY HISTORY REVIEWED AS PER CHART, WITH PERTINENT FINDINGS INCLUDED IN HISTORY SECTION OF NOTE.     Current Medications    Medication List with Changes/Refills   Current Medications    ALPRAZOLAM (XANAX) 0.5 MG TABLET    Take 1 tablet (0.5 mg total) by mouth every evening.    ASPIRIN 81 MG CHEW    Take 1 tablet (81 mg total) by mouth once daily.    ATORVASTATIN (LIPITOR) 40 MG TABLET    TAKE 1 TABLET BY MOUTH EVERY DAY    BUPROPION (WELLBUTRIN XL) 300 MG 24 HR TABLET    Take 1 tablet (300 mg total) by mouth once daily.    CHOLECALCIFEROL, VITAMIN D3, (VITAMIN D3) 50 MCG (2,000 UNIT) CAP CAPSULE    TAKE 1  "CAPSULE (2,000 UNITS TOTAL) BY MOUTH ONCE DAILY.    ERGOCALCIFEROL (ERGOCALCIFEROL) 50,000 UNIT CAP    TAKE 1 CAPSULE BY MOUTH ONE TIME PER WEEK    HYDROCODONE-ACETAMINOPHEN (NORCO) 5-325 MG PER TABLET    Take 1 tablet by mouth every 6 (six) hours as needed for Pain.    METFORMIN (GLUCOPHAGE-XR) 500 MG ER 24HR TABLET    Take 1 tablet (500 mg total) by mouth 2 (two) times daily with meals.    NICOTINE (NICODERM CQ) 14 MG/24 HR    Place 1 patch onto the skin once daily.    NICOTINE POLACRILEX 2 MG LOZG    Take 1 lozenge (2 mg total) by mouth as needed (1-2 per hour in the place of cigarette (5-10 daily max) (mini).    ONDANSETRON (ZOFRAN-ODT) 8 MG TBDL    Dissolve 1 tablet (8 mg total) by mouth every 12 (twelve) hours as needed (nausea).    SERTRALINE (ZOLOFT) 100 MG TABLET    TAKE 1 TABLET BY MOUTH EVERY DAY    SILDENAFIL (VIAGRA) 100 MG TABLET    Take 1 tablet (100 mg total) by mouth daily as needed for Erectile Dysfunction.    TAMSULOSIN (FLOMAX) 0.4 MG CAP    TAKE TWO CAPSULES BY MOUTH DAILY IN THE EVENING.       Allergies   Review of patient's allergies indicates:  No Known Allergies      Review of Systems (Pertinent positives)  Review of Systems   Constitutional:  Negative for unexpected weight change.   Eyes:  Negative for visual disturbance.   Respiratory:  Negative for cough and shortness of breath.    Cardiovascular:  Positive for leg swelling (chronic stable). Negative for chest pain.   Neurological:  Negative for dizziness, light-headedness and headaches.       /64 (BP Location: Left arm, Patient Position: Sitting, BP Method: Medium (Manual))   Pulse 65   Ht 5' 10" (1.778 m)   Wt 101.6 kg (223 lb 15.8 oz)   SpO2 96%   BMI 32.14 kg/m²     Physical Exam  Vitals reviewed.   Constitutional:       General: He is not in acute distress.     Appearance: He is well-developed.   HENT:      Head: Normocephalic and atraumatic.   Eyes:      Conjunctiva/sclera: Conjunctivae normal.   Cardiovascular:      Rate " and Rhythm: Normal rate and regular rhythm.      Pulses: Normal pulses.   Pulmonary:      Effort: Pulmonary effort is normal.      Breath sounds: Normal breath sounds.   Musculoskeletal:      Right lower leg: No edema.      Left lower leg: Edema (Trace, nonpitting and associated with distended varicose veins) present.   Skin:     General: Skin is warm and dry.   Neurological:      General: No focal deficit present.      Mental Status: He is alert and oriented to person, place, and time.   Psychiatric:         Mood and Affect: Mood normal.         Behavior: Behavior normal.           Assessment/Plan:  Stef Pool is a 67 y.o. male who presents today for :        ICD-10-CM ICD-9-CM    1. Prediabetes  R73.03 790.29 Hemoglobin A1C      Comprehensive Metabolic Panel      Lipid Panel      CBC Auto Differential      TSH      2. Combined hyperlipidemia  E78.2 272.2       3. Class 1 obesity due to excess calories with serious comorbidity and body mass index (BMI) of 30.0 to 30.9 in adult  E66.09 278.00     Z68.30 V85.30       4. Venous insufficiency of lower extremity, unspecified laterality  I87.2 459.81       5. Tobacco abuse counseling  Z71.6 V65.42      305.1       6. Long-term use of aspirin therapy  Z79.82 V58.66       7. H/O endoscopic sinus surgery  Z98.890 V15.29       8. BARAK on CPAP  G47.33 327.23       9. Major depressive disorder, recurrent, moderate  F33.1 296.32       10. Anxiety  F41.9 300.00       11. Needs flu shot  Z23 V04.81       12. Screening for malignant neoplasm of prostate  Z12.5 V76.44 PSA, Screening      13. Vitamin D deficiency  E55.9 268.9 Vitamin D      14. Medication management  Z79.899 V58.69 Hemoglobin A1C      Comprehensive Metabolic Panel      Lipid Panel      CBC Auto Differential      TSH      PSA, Screening      Vitamin D        COVID-19 VACCINE COMPLETED W/ MODERNA BOOSTER 12/22/21-- UPDATED FALL 2023 COVID-19 VACCINE AND RSV VACCINE ADVISED, TDaP Advised, COLONOSCOPY AGAIN  ORDERED (WAS DUE 7/2022)   H/O COLONIC POLYPS:  Due for repeat colonoscopy as of 07/2022, repeat order entered     OBESITY/OVERWEIGHT BMI:  Counseled on diet/exercise habits and the importance of continued focus on weight loss. Plans to continue walking/bike riding.  Doing a bit better cutting back on sweets with triglycerides reduced to the 200 range, recheck in 6 months.    OBSTRUCTIVE SLEEP APNEA, HISTORY OF ENDOSCOPIC SINUS SURGERY:  Doing well following sinus surgery September 2023.  Reading is overall much better and sleep has improved.  Following up with ENT.  Has not attempted to resume his CPAP treatment.  Considering inspire pending results of his sinus surgery in terms of management of sleep apnea.     PREDIABETES: A1c controlled on metformin  mg b.i.d and with lifestyle improvements. Currently 5.9, Continue current regimen and recheck 6 months.      HLD:  LDL at goal of less than 100 on Lipitor 40, Continue diet/exercise/weight loss efforts especially given triglyceride elevation, though this is mildl improved, and Recheck in 6 months     DEPRESSION/ANXIETY: Anxiety stable on Zoloft 100 mg daily with 1 low-dose Xanax daily.  Notes mood improvement with Adding Wellbutrin 300 XL daily but this has not really helped with smoking cessation.     SMOKER:  Unfortunately after quitting smoking for about 18 months he resumed smoking again around October 2020.  Working diligently with smoking cessation, continue Wellbutrin 300 XL, prescription for nicotine replacement patches.Lozenges,--did quit temporarily after his sinus surgery but started back again.  Currently smoking about half pack per day.    URGED TO CONTINUE SMOKING CESSATION EFFORTS AND AN ORDER WAS PLACED FOR AORTIC ANEURYSM SCREENING GIVEN THAT HE IS 67, MAIL AND HS A H/O SMOKING     VITAMIN-D DEFICIENCY:  Level now normal on combination of weekly 45669 IU D2 with daily 2000 IU D3.  Yearly monitoring     BILATERAL DISTENDED PAINFUL VARICOSE  VEINS: following with Vascular Dr Morris placed, wearing compression stockings, ultrasound showed venous reflux of the left lower extremity, doing okay with conservative management for now but can follow-up with vascular as needed.       BPH:  symptoms controlled on Flomax 0.8 mg nightly, consider urology referral if worsening.  PSA yearly-- mild increase--- will monitor     Patient Instructions   FALL 2023 COVID-19 VACCINE WITH RSV VACCINE ADVISED      Follow up in about 6 months (around 4/27/2024) for to follow up on lab results, return as needed for new concerns.

## 2023-10-31 DIAGNOSIS — F41.9 ANXIETY: ICD-10-CM

## 2023-10-31 RX ORDER — ALPRAZOLAM 0.5 MG/1
0.5 TABLET ORAL NIGHTLY
Qty: 90 TABLET | Refills: 1 | Status: SHIPPED | OUTPATIENT
Start: 2023-10-31 | End: 2023-11-13 | Stop reason: SDUPTHER

## 2023-10-31 NOTE — TELEPHONE ENCOUNTER
No care due was identified.  [facility] Embedded Care Due Messages. Reference number: [id number].   10/31/2023 4:28:23 PM CDT

## 2023-11-02 ENCOUNTER — TELEPHONE (OUTPATIENT)
Dept: SMOKING CESSATION | Facility: CLINIC | Age: 68
End: 2023-11-02
Payer: MEDICARE

## 2023-11-03 DIAGNOSIS — E55.9 VITAMIN D DEFICIENCY: ICD-10-CM

## 2023-11-03 RX ORDER — ACETAMINOPHEN 500 MG
TABLET ORAL DAILY
Qty: 100 CAPSULE | Refills: 1 | Status: SHIPPED | OUTPATIENT
Start: 2023-11-03 | End: 2023-11-13 | Stop reason: SDUPTHER

## 2023-11-06 ENCOUNTER — OFFICE VISIT (OUTPATIENT)
Dept: OTOLARYNGOLOGY | Facility: CLINIC | Age: 68
End: 2023-11-06
Payer: MEDICARE

## 2023-11-06 VITALS — HEIGHT: 70 IN | BODY MASS INDEX: 31.92 KG/M2 | WEIGHT: 223 LBS

## 2023-11-06 DIAGNOSIS — G47.33 OBSTRUCTIVE SLEEP APNEA: ICD-10-CM

## 2023-11-06 DIAGNOSIS — Z98.890 S/P NASAL SEPTOPLASTY: Primary | ICD-10-CM

## 2023-11-06 DIAGNOSIS — Z78.9 INTOLERANCE OF CONTINUOUS POSITIVE AIRWAY PRESSURE (CPAP) VENTILATION: ICD-10-CM

## 2023-11-06 DIAGNOSIS — J30.2 SEASONAL ALLERGIC RHINITIS, UNSPECIFIED TRIGGER: ICD-10-CM

## 2023-11-06 DIAGNOSIS — R09.81 CHRONIC NASAL CONGESTION: ICD-10-CM

## 2023-11-06 DIAGNOSIS — J34.3 NASAL TURBINATE HYPERTROPHY: ICD-10-CM

## 2023-11-06 PROCEDURE — 3008F BODY MASS INDEX DOCD: CPT | Mod: CPTII,S$GLB,, | Performed by: OTOLARYNGOLOGY

## 2023-11-06 PROCEDURE — 1101F PT FALLS ASSESS-DOCD LE1/YR: CPT | Mod: CPTII,S$GLB,, | Performed by: OTOLARYNGOLOGY

## 2023-11-06 PROCEDURE — 99024 PR POST-OP FOLLOW-UP VISIT: ICD-10-PCS | Mod: S$GLB,,, | Performed by: OTOLARYNGOLOGY

## 2023-11-06 PROCEDURE — 1159F MED LIST DOCD IN RCRD: CPT | Mod: CPTII,S$GLB,, | Performed by: OTOLARYNGOLOGY

## 2023-11-06 PROCEDURE — 3288F PR FALLS RISK ASSESSMENT DOCUMENTED: ICD-10-PCS | Mod: CPTII,S$GLB,, | Performed by: OTOLARYNGOLOGY

## 2023-11-06 PROCEDURE — 99024 POSTOP FOLLOW-UP VISIT: CPT | Mod: S$GLB,,, | Performed by: OTOLARYNGOLOGY

## 2023-11-06 PROCEDURE — 31231 NASAL ENDOSCOPY DX: CPT | Mod: 79,S$GLB,, | Performed by: OTOLARYNGOLOGY

## 2023-11-06 PROCEDURE — 3044F PR MOST RECENT HEMOGLOBIN A1C LEVEL <7.0%: ICD-10-PCS | Mod: CPTII,S$GLB,, | Performed by: OTOLARYNGOLOGY

## 2023-11-06 PROCEDURE — 1126F PR PAIN SEVERITY QUANTIFIED, NO PAIN PRESENT: ICD-10-PCS | Mod: CPTII,S$GLB,, | Performed by: OTOLARYNGOLOGY

## 2023-11-06 PROCEDURE — 31231 PR NASAL ENDOSCOPY, DX: ICD-10-PCS | Mod: 79,S$GLB,, | Performed by: OTOLARYNGOLOGY

## 2023-11-06 PROCEDURE — 3008F PR BODY MASS INDEX (BMI) DOCUMENTED: ICD-10-PCS | Mod: CPTII,S$GLB,, | Performed by: OTOLARYNGOLOGY

## 2023-11-06 PROCEDURE — 3288F FALL RISK ASSESSMENT DOCD: CPT | Mod: CPTII,S$GLB,, | Performed by: OTOLARYNGOLOGY

## 2023-11-06 PROCEDURE — 1126F AMNT PAIN NOTED NONE PRSNT: CPT | Mod: CPTII,S$GLB,, | Performed by: OTOLARYNGOLOGY

## 2023-11-06 PROCEDURE — 1101F PR PT FALLS ASSESS DOC 0-1 FALLS W/OUT INJ PAST YR: ICD-10-PCS | Mod: CPTII,S$GLB,, | Performed by: OTOLARYNGOLOGY

## 2023-11-06 PROCEDURE — 3044F HG A1C LEVEL LT 7.0%: CPT | Mod: CPTII,S$GLB,, | Performed by: OTOLARYNGOLOGY

## 2023-11-06 PROCEDURE — 1159F PR MEDICATION LIST DOCUMENTED IN MEDICAL RECORD: ICD-10-PCS | Mod: CPTII,S$GLB,, | Performed by: OTOLARYNGOLOGY

## 2023-11-06 NOTE — PROGRESS NOTES
OTOLARYNGOLOGY CLINIC NOTE  Date:  11/06/2023     Chief complaint:  Chief Complaint   Patient presents with    Follow-up     6 week follow up for septoplasty       History of Present Illness  Stef Pool is a 67 y.o. male  presenting today for a followup.  s/p septoplasty and turbinate reduction on 9-19 -23. Plan was to repeat trial of cpap once healed from above surgery and if still was having problems could look into inspire. Had DISE on 6-13-23 which showed a-p collapse, enlarged uvula. 45% side wall collapse but not concentric as well as deviated septum therefore we decided to try fixing septum first with retry of cpap     Has not started flonase. No nasal congestion. Breathing through his nose much better. No nosebleeds no nasal crusting. Has not retried cpap yet. Per wife not snoring as bad and does not sound like pausing in breathing. Sometimes has twitching at night still.     Regarding barak history, copied from note on 6-29-23 below :  PSG 2-25-23 with AHI of 61.7, no central apneas  Sleep study split night titrated up to 17 to get ahi below 5. Current cpap machine only at 8 and does not get benefit. Uses nasal mask without chin strap . He dislikes treatment and eports a lot of leaks as well. Seen by sleep np jocelyn pittman 1-26-23 and notes reviewed    Past Medical History  Past Medical History:   Diagnosis Date    Anxiety 11/20/2015    Combined hyperlipidemia 11/23/2012    HEARING LOSS     Major depressive disorder, recurrent, moderate 11/23/2012    Obesity (BMI 30.0-34.9) 11/20/2015    diet and exercise changes discussed     BARAK on CPAP 11/20/2015    uses at least 4 hours nightly     Prediabetes 11/23/2016    making dietary and exercise changes and staring Metformin 500 mg XR daily.  recheck in 3 months    Smoker 05/02/2013        Past Surgical History  Past Surgical History:   Procedure Laterality Date    COLONOSCOPY N/A 7/12/2019    Procedure: COLONOSCOPY;  Surgeon: Amrit Reyes MD;   Lens Material: Location[facility] ENDO;  Service: Endoscopy;  Laterality: N/A;    NASAL SEPTOPLASTY N/A 9/19/2023    Procedure: SEPTOPLASTY, NOSE;  Surgeon: Ann Gorss MD;  Location: [facility] OR;  Service: ENT;  Laterality: N/A;  RN PREOP 9/12/2023----NEED H/P    NASAL TURBINATE REDUCTION Bilateral 9/19/2023    Procedure: REDUCTION, NASAL TURBINATE;  Surgeon: Ann Gross MD;  Location: [facility] OR;  Service: ENT;  Laterality: Bilateral;    SLEEP ENDOSCOPY, DRUG-INDUCED N/A 6/13/2023    Procedure: SLEEP ENDOSCOPY,DRUG-INDUCED;  Surgeon: Ann Gross MD;  Location: [facility] OR;  Service: ENT;  Laterality: N/A;  RN PREOP 06/09/2023 --JM----NEED H/P        Medications  Current Outpatient Medications on File Prior to Visit   Medication Sig Dispense Refill    ALPRAZolam (XANAX) 0.5 MG tablet Take 1 tablet (0.5 mg total) by mouth every evening. 90 tablet 1    aspirin 81 MG Chew Take 1 tablet (81 mg total) by mouth once daily.      atorvastatin (LIPITOR) 40 MG tablet TAKE 1 TABLET BY MOUTH EVERY DAY 90 tablet 3    buPROPion (WELLBUTRIN XL) 300 MG 24 hr tablet Take 1 tablet (300 mg total) by mouth once daily. 90 tablet 4    cholecalciferol, vitamin D3, (VITAMIN D3) 50 mcg (2,000 unit) Cap capsule TAKE 1 CAPSULE (2,000 UNITS TOTAL) BY MOUTH ONCE DAILY. 100 capsule 1    ergocalciferol (ERGOCALCIFEROL) 50,000 unit Cap TAKE 1 CAPSULE BY MOUTH ONE TIME PER WEEK 12 capsule 4    HYDROcodone-acetaminophen (NORCO) 5-325 mg per tablet Take 1 tablet by mouth every 6 (six) hours as needed for Pain. 20 tablet 0    metFORMIN (GLUCOPHAGE-XR) 500 MG ER 24hr tablet Take 1 tablet (500 mg total) by mouth 2 (two) times daily with meals. 180 tablet 4    nicotine (NICODERM CQ) 14 mg/24 hr Place 1 patch onto the skin once daily. (Patient not taking: Reported on 10/27/2023) 28 patch 0    nicotine polacrilex 2 MG Lozg Take 1 lozenge (2 mg total) by mouth as needed (1-2 per hour in the place of cigarette (5-10 daily max) (mini). (Patient not taking: Reported  on 10/27/2023) 144 lozenge 0    ondansetron (ZOFRAN-ODT) 8 MG TbDL Dissolve 1 tablet (8 mg total) by mouth every 12 (twelve) hours as needed (nausea). 10 tablet 0    sertraline (ZOLOFT) 100 MG tablet TAKE 1 TABLET BY MOUTH EVERY DAY 90 tablet 3    sildenafiL (VIAGRA) 100 MG tablet Take 1 tablet (100 mg total) by mouth daily as needed for Erectile Dysfunction. 9 tablet 11    tamsulosin (FLOMAX) 0.4 mg Cap TAKE TWO CAPSULES BY MOUTH DAILY IN THE EVENING. (Patient taking differently: TAKE TWO CAPSULES BY MOUTH DAILY IN THE EVENING. ON HOLD) 180 capsule 3     No current facility-administered medications on file prior to visit.       Review of Systems  Review of Systems   Constitutional: Negative.    HENT: Negative.     Eyes: Negative.    Cardiovascular: Negative.    Gastrointestinal: Negative.    Genitourinary: Negative.    Musculoskeletal: Negative.    Skin: Negative.    Neurological: Negative.    Psychiatric/Behavioral: Negative.        Answers submitted by the patient for this visit:  Review of Symptoms Questionnaire  (Submitted on 11/6/2023)  Sleep Apnea?: Yes    Social History   reports that he has been smoking cigarettes. He has a 0.5 pack-year smoking history. He has never used smokeless tobacco. He reports current alcohol use. He reports that he does not use drugs.     Family History  Family History   Problem Relation Age of Onset    Heart disease Mother         Physical Exam   There were no vitals filed for this visit. Body mass index is 32 kg/m².            GENERAL: no acute distress.  HEAD: normocephalic.   EYES: No scleral icterus  EARS: external ear without lesion, normal pinna shape and position.  External auditory canal with normal cerumen, tympanic membrane fully visible, no perforation , no retraction. No middle ear effusion. Ossicles intact.   NOSE: external nose without significant bony abnormality  ORAL CAVITY/OROPHARYNX: tongue mobile.   NECK: trachea midline.   RESPIRATORY: no stridor, no stertor.  Voice normal. Respirations nonlabored.  NEURO: alert, responds to questions appropriately.    PSYCH:mood appropriate    PROCEDURE NOTE  Procedure: diagnostic rigid nasal endoscopy  Indications for procedure:  Chronic nasal congestion with history of septoplasty surgery, check healing and ensure no sinus disease    Consent: procedure was explained in detail and verbal consent was obtained.   Anesthesia:4% lidocaine with neosynephrine  Procedure in detail: With the patient in the seated position, the zero degree endoscope was inserted atraumatically into the bilateral nasal cavities and advance to the nasopharynx with the following areas examined with findings as described below.     Nasal cavity:no polyps or mass, no purulent drainage, no bleeding  Septum: no perforation ; midline  Turbinates:  inferior turbinates with hypertrophy ; middle turbinates   Middle Meati: mild edema  Nasopharynx: no mass or lesion in the nasopharynx.     The scope was removed atraumatically without complication. The patient tolerated the procedure well. Photodocumentation obtained with representative images below, all images and/or videos uploaded in media section of epic.                                          Imaging:  The patient does not have any new imaging of the head and neck since last visit.     Labs:  CBC  Recent Labs   Lab 04/24/23  0655 06/09/23  1130 09/12/23  0940   WBC 8.22 8.93 9.59   Hemoglobin 14.2 16.2 14.3   Hematocrit 44.4 49.5 44.6   MCV 91 90 90   Platelets 175 201 169     BMP  Recent Labs   Lab 04/22/22  0709 10/24/22  0655 06/09/23  1130 09/12/23  0940 10/20/23  0650   Glucose 95   < > 72 130 H 107   Sodium 141   < > 139 137 140   Potassium 4.2   < > 4.3 4.0 4.2   Chloride 108   < > 107 106 106   CO2 22 L   < > 24 24 25   BUN 18   < > 15 15 17   Creatinine 1.0   < > 1.0 1.0 1.1   Calcium 9.4   < > 9.6 9.5 9.6   Magnesium 2.1  --   --   --  2.1    < > = values in this interval not displayed.     COAGS         Assessment  1. S/P nasal septoplasty    2. Nasal turbinate hypertrophy    3. Intolerance of continuous positive airway pressure (CPAP) ventilation    4. Obstructive sleep apnea    5. Seasonal allergic rhinitis, unspecified trigger    6. Chronic nasal congestion       Plan:  Discussed plan of care with patient in detail and all questions answered. Patient reported understanding of plan of care. I gave the patient the opportunity to ask questions and patient confirmed all questions answered to satisfaction.     Has both nasal sprays at home  Saline flonase and astelin bid  Retry cpap after a week on sprays, continue sprays     Does not have autocpap he does not think he has it at 12 ; try at 12 and if uncomfortable could try lower level. could repeat cpap study depending on response- if able to wear more easily than pre nasal surgery but seems like not controlled at level of 12 ( study stated need to be at 17 but that was pre nasal surgery)  Can [place] back to inspire if not tolerating cpap can message me after tries cpap and can come in sooner for inspire  Counseled that with severity of jonathon , nasal surgery would not be curative. Can help with snoring but needs to treat jonathon as would not resolve with nasal surgery alone.     F/u 2-3 months, message me after tries cpap with how he does and can make suggestions depending on how does with cpap    I spent a total of 30 minutes on the day of the visit.  This includes face to face time and non-face to face time preparing to see the patient (eg, review of tests), obtaining and/or reviewing separately obtained history, documenting clinical information in the electronic or other health record, independently interpreting results and communicating results to the patient/family/caregiver, or care coordinator.   Please be aware that this note has been generated with the assistance of [employer]bet voice-to-text.  Please excuse any spelling or grammatical errors.

## 2023-11-13 ENCOUNTER — PATIENT MESSAGE (OUTPATIENT)
Dept: FAMILY MEDICINE | Facility: CLINIC | Age: 68
End: 2023-11-13
Payer: MEDICARE

## 2023-11-13 DIAGNOSIS — Z98.890 H/O ENDOSCOPIC SINUS SURGERY: Primary | ICD-10-CM

## 2023-11-15 RX ORDER — FLUTICASONE PROPIONATE 50 MCG
2 SPRAY, SUSPENSION (ML) NASAL DAILY
Qty: 48 G | Refills: 4 | Status: SHIPPED | OUTPATIENT
Start: 2023-11-15

## 2024-01-03 ENCOUNTER — TELEPHONE (OUTPATIENT)
Dept: GASTROENTEROLOGY | Facility: CLINIC | Age: 69
End: 2024-01-03
Payer: MEDICARE

## 2024-01-11 ENCOUNTER — TELEPHONE (OUTPATIENT)
Dept: GASTROENTEROLOGY | Facility: CLINIC | Age: 69
End: 2024-01-11
Payer: MEDICARE

## 2024-01-11 NOTE — TELEPHONE ENCOUNTER
Endoscopy Scheduling Questionnaire:         Are you taking any blood thinners? no               If Yes  Have you been on them for longer than one year? N/a    2. Have you been diagnosed with Diverticulitis in past three months?  no    3. Are you on dialysis or have Kidney Disease? no    4. Previous Colonoscopy?  yes         If yes Do you have a history of colon polyps?  yes    5. Family History of Colon Cancer: yes         Relation: Brother       Age at Diagnosis: ?      6. Are you a diabetic?  no    7. Do you have a history of constipation?  no    8. Are you taking a GLP-1 Agonist/Adipex (weight loss drugs)? no  Dulaglutide (Trulicity) (weekly)  Exenatide extended release (Bydureon bcise) (weekly)  Exenatide (Byetta) (twice daily)  Semaglutide (Ozempic) (weekly)  Liraglutide (Victoza, Saxenda) (daily)  Lixisenatide (Adlyxin) (daily)  Semaglutide (Rybelsus) (taken by mouth once daily)    Hold GLP-1 agonists on the day of the procedure/surgery for patients who take the medication daily.    Hold GLP-1 agonists a week prior to the procedure/surgery for patients who take the medication weekly.    Procedure scheduled with Dr. Prado  on  3/5/2024    The prep being used is Sutab     The patient's prep instructions were sent via patient portal.      SUTAB Instructions    [facility] [address]  Clinic Office [phone]  Endoscopy Lab [phone]    You are scheduled for a Colonoscopy with Dr. Prado on 3/5/2024 at [facility] in [place].    Check in at the Hospital -1st floor, Information desk.   Call [phone] to reschedule.    An adult friend/family member must come with you to drive you home.  You cannot drive, take a taxi, Uber/Lyft or bus to leave the Endoscopy Center alone.  If you do not have someone to drive you home, your test will be cancelled.     Please follow the directions of your doctor if you take any pills that thin your blood. If you take these meds: Aggrenox,  Brilinta, Effient, Eliquis, Lovenox, Plavix, Pletal, Pradaxa, Ticilid, Xarelto or Coumadin, let the doctor's office know.    Please hold any GLP-1 medications prior to the procedure: Dulaglutide Trulicity(hold week prior), Exenatide Byetta (hold the morning of procedure), Semaglutide Ozempic (hold week prior), Liraglutide Victoza, Saxenda(hold week prior), Lixisenatide Adlyxin (hold the morning of procedure), Semaglutide Rybelsus (hold the morning of procedure), Tirzepatide Mounjaro (hold week prior)     DON'T: On the morning of the test do not take insulin or pills for diabetes.     DO: On the morning of the test, do take any pills for blood pressure, heart, anti-rejection and or seizures with a small sip of water. Bring any inhalers with you.    To have a good prep, you must follow these instructions - please do not use the directions from the pharmacy.    The doctor will send a prescription for the SUTAB.    The Day Before the test:    You can only drink CLEAR LIQUIDS the whole day before your test.  You can't eat any food for the whole day.    You CAN have:  Water, Coffee or decaf coffee (no milk or cream)  Tea  Soft drinks - regular and sugar free  Jell-O (green or yellow)  Apple Juice, grape juice, white cranberry juice  Gatorade, Power Aid, Crystal Light, Pilo Aid  Lemonade and Limeade  Bouillon, clear soup  Snowball, popsicles  YOU CAN'T DRINK ANYTHING RED, PURPLE ORANGE OR BLUE   YOU CAN'T DRINK ALCOHOL  ONLY DRINK WHAT IS ON THE LIST      At 5 pm the night before your test:    Open the bottle of 12 tablets. Fill the provided cup with water up to the line = 16 oz. Swallow each pill with water. Drink the rest of the water in the cup in 20 minutes.    At 6 pm:  Fill the cup with water up to the line = 16 oz. Drink the cup of water in 30 minutes.    At 7 pm:  Fill the cup with water up to the line = 16 oz. Drink the cup of water in 30 minutes.     Continue to drink water or clear liquids until  you go to sleep.      The Day of the test - We will call you 2 days before your test to tell you what time to get to the hospital. We will also tell you when to do the next steps.    5 hours before you come to the hospital (this may be in the middle of the night): ___________________= time  Open the bottle of 12 tablets. Fill the cup with water up to the line = 16 oz. Swallow each pill with water. Drink the rest of the water in the cup in 20 minutes.    At 4 hours before you come to the hospital: ______________= time  Fill the cup with water up to the line = 16 oz. Drink the cup of water in 30 minutes.     At 3 hours before you come to the hospital: ______________= time    YOU CAN'T EAT OR DRINK ANYTHING ELSE ONCE YOU FINISH THE WATER AT _____________ = TIME    Leave all valuables and jewelry at home. You will be at the hospital for 2-4 hours.    Call the Endoscopy department at [phone] with any questions about your procedure.          Please give this Coupon Code to your pharmacist.    BIN: [id number]  PCN: [id number]  GROUP: [id number]  MEMBER ID: [id number]

## 2024-01-15 RX ORDER — SOD SULF/POT CHLORIDE/MAG SULF 1.479 G
12 TABLET ORAL DAILY
Qty: 24 TABLET | Refills: 0 | Status: SHIPPED | OUTPATIENT
Start: 2024-01-15

## 2024-01-18 ENCOUNTER — OFFICE VISIT (OUTPATIENT)
Dept: OTOLARYNGOLOGY | Facility: CLINIC | Age: 69
End: 2024-01-18
Payer: MEDICARE

## 2024-01-18 VITALS — BODY MASS INDEX: 31.92 KG/M2 | WEIGHT: 223 LBS | HEIGHT: 70 IN

## 2024-01-18 DIAGNOSIS — J30.2 SEASONAL ALLERGIC RHINITIS, UNSPECIFIED TRIGGER: ICD-10-CM

## 2024-01-18 DIAGNOSIS — Z78.9 INTOLERANCE OF CONTINUOUS POSITIVE AIRWAY PRESSURE (CPAP) VENTILATION: ICD-10-CM

## 2024-01-18 DIAGNOSIS — J34.89 NASAL CRUSTING: ICD-10-CM

## 2024-01-18 DIAGNOSIS — G47.33 OBSTRUCTIVE SLEEP APNEA: Primary | ICD-10-CM

## 2024-01-18 DIAGNOSIS — Z98.890 S/P NASAL SEPTOPLASTY: ICD-10-CM

## 2024-01-18 PROCEDURE — 1126F AMNT PAIN NOTED NONE PRSNT: CPT | Mod: CPTII,S$GLB,, | Performed by: OTOLARYNGOLOGY

## 2024-01-18 PROCEDURE — 3008F BODY MASS INDEX DOCD: CPT | Mod: CPTII,S$GLB,, | Performed by: OTOLARYNGOLOGY

## 2024-01-18 PROCEDURE — 3288F FALL RISK ASSESSMENT DOCD: CPT | Mod: CPTII,S$GLB,, | Performed by: OTOLARYNGOLOGY

## 2024-01-18 PROCEDURE — 99214 OFFICE O/P EST MOD 30 MIN: CPT | Mod: S$GLB,,, | Performed by: OTOLARYNGOLOGY

## 2024-01-18 PROCEDURE — 1101F PT FALLS ASSESS-DOCD LE1/YR: CPT | Mod: CPTII,S$GLB,, | Performed by: OTOLARYNGOLOGY

## 2024-01-18 PROCEDURE — 1159F MED LIST DOCD IN RCRD: CPT | Mod: CPTII,S$GLB,, | Performed by: OTOLARYNGOLOGY

## 2024-01-18 RX ORDER — MUPIROCIN 20 MG/G
OINTMENT TOPICAL 2 TIMES DAILY
Qty: 1 EACH | Refills: 0 | Status: SHIPPED | OUTPATIENT
Start: 2024-01-18 | End: 2024-02-01

## 2024-01-18 NOTE — PROGRESS NOTES
OTOLARYNGOLOGY CLINIC NOTE  Date:  01/18/2024     Chief complaint:  Chief Complaint   Patient presents with    Follow-up     Follow up s/p septo plasty       History of Present Illness  Stef Pool is a 68 y.o. male  presenting today for a followup.  Not using cpap ; retried the mask as advised; nasal mask does not stay on his face well. Has nasal pillows and having issues with it staying on. Pressure is ok now when he does have it on but could not keep I t on and air would escape  He thinks he may have tried other nasal mask the non-pillow one but not full face.    Has some pink liquid for 6 weeks comes and goes ; only on left side. No nasal pain     I last saw the patient on 11-6 - 23. I had started him on a nasal spray regimen at that time and had recommended retrial of cpap. Below text is copied from  note on that date describing history of present illness at that time :  s/p septoplasty and turbinate reduction on 9-19 -23. Plan was to repeat trial of cpap once healed from above surgery and if still was having problems could look into inspire. Had DISE on 6-13-23 which showed a-p collapse, enlarged uvula. 45% side wall collapse but not concentric as well as deviated septum therefore we decided to try fixing septum first with retry of cpap      Has not started flonase. No nasal congestion. Breathing through his nose much better. No nosebleeds no nasal crusting. Has not retried cpap yet. Per wife not snoring as bad and does not sound like pausing in breathing. Sometimes has twitching at night still.      Regarding jonathon history, copied from note on 6-29-23 below :  PSG 2-25-23 with AHI of 61.7, no central apneas  Sleep study split night titrated up to 17 to get ahi below 5. Current cpap machine only at 8 and does not get benefit. Uses nasal mask without chin strap . He dislikes treatment and eports a lot of leaks as well. Seen by sleep np jocelyn pittman 1-26-23 and notes reviewed    Past Medical History  Past  Medical History:   Diagnosis Date    Anxiety 11/20/2015    Combined hyperlipidemia 11/23/2012    HEARING LOSS     Major depressive disorder, recurrent, moderate 11/23/2012    Obesity (BMI 30.0-34.9) 11/20/2015    diet and exercise changes discussed     BARAK on CPAP 11/20/2015    uses at least 4 hours nightly     Prediabetes 11/23/2016    making dietary and exercise changes and staring Metformin 500 mg XR daily.  recheck in 3 months    Smoker 05/02/2013        Past Surgical History  Past Surgical History:   Procedure Laterality Date    COLONOSCOPY N/A 7/12/2019    Procedure: COLONOSCOPY;  Surgeon: Amrit Reyes MD;  Location: [facility];  Service: Endoscopy;  Laterality: N/A;    NASAL SEPTOPLASTY N/A 9/19/2023    Procedure: SEPTOPLASTY, NOSE;  Surgeon: Ann Gross MD;  Location: [facility] OR;  Service: ENT;  Laterality: N/A;  RN PREOP 9/12/2023----NEED H/P    NASAL TURBINATE REDUCTION Bilateral 9/19/2023    Procedure: REDUCTION, NASAL TURBINATE;  Surgeon: Ann Gross MD;  Location: [facility] OR;  Service: ENT;  Laterality: Bilateral;    SLEEP ENDOSCOPY, DRUG-INDUCED N/A 6/13/2023    Procedure: SLEEP ENDOSCOPY,DRUG-INDUCED;  Surgeon: Ann Gross MD;  Location: [facility] OR;  Service: ENT;  Laterality: N/A;  RN PREOP 06/09/2023 --JM----NEED H/P        Medications  Current Outpatient Medications on File Prior to Visit   Medication Sig Dispense Refill    ALPRAZolam (XANAX) 0.5 MG tablet Take 1 tablet (0.5 mg total) by mouth every evening. 90 tablet 1    aspirin 81 MG Chew Take 1 tablet (81 mg total) by mouth once daily.      atorvastatin (LIPITOR) 40 MG tablet Take 1 tablet (40 mg total) by mouth once daily. 90 tablet 3    buPROPion (WELLBUTRIN XL) 300 MG 24 hr tablet Take 1 tablet (300 mg total) by mouth once daily. 90 tablet 4    cholecalciferol, vitamin D3, (VITAMIN D3) 50 mcg (2,000 unit) Cap capsule Take 1 capsule (2,000 Units total) by mouth once daily. 100 capsule 1    ergocalciferol (ERGOCALCIFEROL)  50,000 unit Cap Take 1 capsule (50,000 Units total) by mouth every 7 days. 12 capsule 4    fluticasone propionate (FLONASE) 50 mcg/actuation nasal spray 2 sprays (100 mcg total) by Each Nostril route once daily. 48 g 4    HYDROcodone-acetaminophen (NORCO) 5-325 mg per tablet Take 1 tablet by mouth every 6 (six) hours as needed for Pain. 20 tablet 0    metFORMIN (GLUCOPHAGE-XR) 500 MG ER 24hr tablet Take 1 tablet (500 mg total) by mouth 2 (two) times daily with meals. 180 tablet 4    nicotine (NICODERM CQ) 14 mg/24 hr Place 1 patch onto the skin once daily. 28 patch 0    nicotine polacrilex 2 MG Lozg Take 1 lozenge (2 mg total) by mouth as needed (1-2 per hour in the place of cigarette (5-10 daily max) (mini). 144 lozenge 0    ondansetron (ZOFRAN-ODT) 8 MG TbDL Dissolve 1 tablet (8 mg total) by mouth every 12 (twelve) hours as needed (nausea). 10 tablet 0    sertraline (ZOLOFT) 100 MG tablet Take 1 tablet (100 mg total) by mouth once daily. 90 tablet 3    sildenafiL (VIAGRA) 100 MG tablet Take 1 tablet (100 mg total) by mouth daily as needed for Erectile Dysfunction. 9 tablet 11    sod sulf-pot chloride-mag sulf (SUTAB) 1.479-0.188- 0.225 gram tablet Take 12 tablets by mouth once daily. Take according to package instructions with indicated amount of water. 24 tablet 0    tamsulosin (FLOMAX) 0.4 mg Cap TAKE TWO CAPSULES BY MOUTH DAILY IN THE EVENING. Strength: 0.4 mg 180 capsule 3     No current facility-administered medications on file prior to visit.       Review of Systems  Review of Systems   Constitutional: Negative.    HENT: Negative.     Eyes: Negative.    Cardiovascular: Negative.    Gastrointestinal: Negative.    Musculoskeletal: Negative.    Skin: Negative.    Neurological: Negative.    Endo/Heme/Allergies:  Bruises/bleeds easily.   Psychiatric/Behavioral: Negative.          Social History   reports that he has been smoking cigarettes. He has a 0.5 pack-year smoking history. He has never used smokeless  "tobacco. He reports current alcohol use. He reports that he does not use drugs.     Family History  Family History   Problem Relation Age of Onset    Heart disease Mother         Physical Exam   There were no vitals filed for this visit. Body mass index is 32 kg/m².  Weight: 101.2 kg (223 lb)   Height: 5' 10" (177.8 cm)     GENERAL: no acute distress.  HEAD: normocephalic.   EYES: No scleral icterus  EARS: external ear without lesion, normal pinna shape and position.    NOSE: external nose without significant bony abnormality; slight crust mid anterior septum on left. None on right. No perforation   ORAL CAVITY/OROPHARYNX: tongue mobile.   NECK: trachea midline.   RESPIRATORY: no stridor, no stertor.  Respirations nonlabored.  NEURO: alert, responds to questions appropriately.    PSYCH:mood appropriate      Imaging:  The patient does not have any new imaging of the head and neck since last visit.     Labs:  CBC  Recent Labs   Lab 04/24/23  0655 06/09/23  1130 09/12/23  0940   WBC 8.22 8.93 9.59   Hemoglobin 14.2 16.2 14.3   Hematocrit 44.4 49.5 44.6   MCV 91 90 90   Platelets 175 201 169     BMP  Recent Labs   Lab 04/22/22  0709 10/24/22  0655 06/09/23  1130 09/12/23  0940 10/20/23  0650   Glucose 95   < > 72 130 H 107   Sodium 141   < > 139 137 140   Potassium 4.2   < > 4.3 4.0 4.2   Chloride 108   < > 107 106 106   CO2 22 L   < > 24 24 25   BUN 18   < > 15 15 17   Creatinine 1.0   < > 1.0 1.0 1.1   Calcium 9.4   < > 9.6 9.5 9.6   Magnesium 2.1  --   --   --  2.1    < > = values in this interval not displayed.     COAGS        Assessment  1. Obstructive sleep apnea    2. Intolerance of continuous positive airway pressure (CPAP) ventilation    3. S/P nasal septoplasty    4. Seasonal allergic rhinitis, unspecified trigger    5. Nasal crusting       Plan:  Discussed plan of care with patient in detail and all questions answered. Patient reported understanding of plan of care. I gave the patient the opportunity to ask " questions and patient confirmed all questions answered to satisfaction.     Call to get full face mask ; if issue reach out to sleep   Stressed importance of tx of jonathon and potential issues with heart, brain shortened lifespan with severe jonathon and again discussed today that nasal surgery I did does not cure jonathon.he did do better with mask after septo regarding pressure so hoping a different mask that can stay on his face better can work but if not would recommend inspire  Discussed oral appliance but I think isnpire would be betetr if fails with different masks. Gave info for people in region ( not all inclusive) that do oral appliance.   Mupirocin bid for 14 days. Has crusting on left and not right so suspect what he seeing is blood tinge related to that. Advised also not to manipulate nose /put tissue in the nose. Discussed may recur and may need to treat household. If persists and no improvement with ointment will scope  Can message me for sooner apptmt if full face mask does not work; do not want time to lapse on prior studies in case he does end up needing to get inspire  F/u 2 months sooner if issue    Please be aware that this note has been generated with the assistance of [employer] voice-to-text.  Please excuse any spelling or grammatical errors.

## 2024-03-01 ENCOUNTER — TELEPHONE (OUTPATIENT)
Dept: ENDOSCOPY | Facility: HOSPITAL | Age: 69
End: 2024-03-01
Payer: MEDICARE

## 2024-03-01 NOTE — TELEPHONE ENCOUNTER
Spoke with patient about arrival time @ 0900.   Colon/Sutab    Prep instructions reviewed: the day before the procedure, follow a clear liquid diet all day, then start the first 1/2 of prep at 5pm and take 2nd 1/2 of prep @ 0400.  Pt must be completely NPO when prep completed @ 0600.              Medications: Do not take Insulin or oral diabetic medications the day of the procedure.  Take as prescribed: heart, seizure and blood pressure medication in the morning with a sip of water (less than an ounce).  Take any breathing medications and bring inhalers to hospital with you Leave all valuables and jewelry at home.     Wear comfortable clothes to procedure to change into hospital gown You cannot drive for 24 hours after your procedure because you will receive sedation for your procedure to make you comfortable.  A ride must be provided at discharge.

## 2024-03-01 NOTE — TELEPHONE ENCOUNTER
Left messages instructing patient to call dept @ [phone] between 8am-3pm.    Arrival time to be given @ 0900  Colon/Sutab  Inst in portal 01/11/24, not read  (Message sent via [facility] portal)

## 2024-03-05 ENCOUNTER — ANESTHESIA EVENT (OUTPATIENT)
Dept: ENDOSCOPY | Facility: HOSPITAL | Age: 69
End: 2024-03-05
Payer: MEDICARE

## 2024-03-05 ENCOUNTER — HOSPITAL ENCOUNTER (OUTPATIENT)
Facility: HOSPITAL | Age: 69
Discharge: HOME | End: 2024-03-05
Attending: INTERNAL MEDICINE | Admitting: INTERNAL MEDICINE
Payer: MEDICARE

## 2024-03-05 ENCOUNTER — ANESTHESIA (OUTPATIENT)
Dept: ENDOSCOPY | Facility: HOSPITAL | Age: 69
End: 2024-03-05
Payer: MEDICARE

## 2024-03-05 VITALS
TEMPERATURE: 98 F | OXYGEN SATURATION: 94 % | HEIGHT: 71 IN | DIASTOLIC BLOOD PRESSURE: 64 MMHG | WEIGHT: 225 LBS | HEART RATE: 51 BPM | BODY MASS INDEX: 31.5 KG/M2 | SYSTOLIC BLOOD PRESSURE: 100 MMHG | RESPIRATION RATE: 11 BRPM

## 2024-03-05 DIAGNOSIS — Z86.010 PERSONAL HISTORY OF COLONIC POLYPS: ICD-10-CM

## 2024-03-05 LAB — POCT GLUCOSE: 100 MG/DL (ref 70–110)

## 2024-03-05 PROCEDURE — 37000008 HC ANESTHESIA 1ST 15 MINUTES: Performed by: INTERNAL MEDICINE

## 2024-03-05 PROCEDURE — 88305 TISSUE EXAM BY PATHOLOGIST: CPT | Mod: 26,,, | Performed by: PATHOLOGY

## 2024-03-05 PROCEDURE — 88305 TISSUE EXAM BY PATHOLOGIST: CPT | Mod: 59 | Performed by: PATHOLOGY

## 2024-03-05 PROCEDURE — 25000003 PHARM REV CODE 250: Performed by: INTERNAL MEDICINE

## 2024-03-05 PROCEDURE — 45385 COLONOSCOPY W/LESION REMOVAL: CPT | Mod: 33,,, | Performed by: INTERNAL MEDICINE

## 2024-03-05 PROCEDURE — D9220A PRA ANESTHESIA: Mod: PT,CRNA,, | Performed by: NURSE ANESTHETIST, CERTIFIED REGISTERED

## 2024-03-05 PROCEDURE — 37000009 HC ANESTHESIA EA ADD 15 MINS: Performed by: INTERNAL MEDICINE

## 2024-03-05 PROCEDURE — 25000003 PHARM REV CODE 250: Performed by: NURSE ANESTHETIST, CERTIFIED REGISTERED

## 2024-03-05 PROCEDURE — 27201089 HC SNARE, DISP (ANY): Performed by: INTERNAL MEDICINE

## 2024-03-05 PROCEDURE — 63600175 PHARM REV CODE 636 W HCPCS: Performed by: NURSE ANESTHETIST, CERTIFIED REGISTERED

## 2024-03-05 PROCEDURE — 45385 COLONOSCOPY W/LESION REMOVAL: CPT | Mod: 33 | Performed by: INTERNAL MEDICINE

## 2024-03-05 PROCEDURE — D9220A PRA ANESTHESIA: Mod: PT,ANES,, | Performed by: UROLOGY

## 2024-03-05 RX ORDER — SODIUM CHLORIDE 0.9 % (FLUSH) 0.9 %
10 SYRINGE (ML) INJECTION
Status: DISCONTINUED | OUTPATIENT
Start: 2024-03-05 | End: 2024-03-05 | Stop reason: HOSPADM

## 2024-03-05 RX ORDER — SODIUM CHLORIDE 9 MG/ML
INJECTION, SOLUTION INTRAVENOUS CONTINUOUS
Status: DISCONTINUED | OUTPATIENT
Start: 2024-03-05 | End: 2024-03-05 | Stop reason: HOSPADM

## 2024-03-05 RX ORDER — DEXTROMETHORPHAN/PSEUDOEPHED 2.5-7.5/.8
DROPS ORAL
Status: COMPLETED | OUTPATIENT
Start: 2024-03-05 | End: 2024-03-05

## 2024-03-05 RX ORDER — LIDOCAINE HYDROCHLORIDE 20 MG/ML
INJECTION INTRAVENOUS
Status: DISCONTINUED | OUTPATIENT
Start: 2024-03-05 | End: 2024-03-05

## 2024-03-05 RX ORDER — PROPOFOL 10 MG/ML
VIAL (ML) INTRAVENOUS
Status: DISCONTINUED | OUTPATIENT
Start: 2024-03-05 | End: 2024-03-05

## 2024-03-05 RX ORDER — PROPOFOL 10 MG/ML
VIAL (ML) INTRAVENOUS CONTINUOUS PRN
Status: DISCONTINUED | OUTPATIENT
Start: 2024-03-05 | End: 2024-03-05

## 2024-03-05 RX ADMIN — LIDOCAINE HYDROCHLORIDE 50 MG: 20 INJECTION, SOLUTION INTRAVENOUS at 12:03

## 2024-03-05 RX ADMIN — PROPOFOL 150 MCG/KG/MIN: 10 INJECTION, EMULSION INTRAVENOUS at 12:03

## 2024-03-05 RX ADMIN — SODIUM CHLORIDE: 9 INJECTION, SOLUTION INTRAVENOUS at 10:03

## 2024-03-05 RX ADMIN — PROPOFOL 80 MG: 10 INJECTION, EMULSION INTRAVENOUS at 12:03

## 2024-03-05 NOTE — ANESTHESIA POSTPROCEDURE EVALUATION
Anesthesia Post Evaluation    Patient: Stef Pool    Procedure(s) Performed: Procedure(s) (LRB):  COLONOSCOPY (N/A)    Final Anesthesia Type: general      Patient location during evaluation: GI PACU  Patient participation: Yes- Able to Participate  Level of consciousness: awake and alert and oriented  Post-procedure vital signs: reviewed and stable  Pain management: adequate  Airway patency: patent    PONV status at discharge: No PONV  Anesthetic complications: no      Cardiovascular status: hemodynamically stable  Respiratory status: unassisted  Hydration status: euvolemic  Follow-up not needed.              Vitals Value Taken Time   /64 03/05/24 1306   Temp 36.5 °C (97.7 °F) 03/05/24 1251   Pulse 51 03/05/24 1306   Resp 11 03/05/24 1306   SpO2 94 % 03/05/24 1306         No case tracking events are documented in the log.      Pain/Jonathan Score: Jonathan Score: 10 (3/5/2024  1:06 PM)

## 2024-03-05 NOTE — PROVATION PATIENT INSTRUCTIONS
Discharge Summary/Instructions after an Endoscopic Procedure  Patient Name: Stef Pool  Patient MRN: [id number]  Patient YOB: 1955 Tuesday, March 5, 2024  Uli Prado MD  Dear patient,  As a result of recent federal legislation (The Federal Cures Act), you may   receive lab or pathology results from your procedure in your [facility]   account before your physician is able to contact you. Your physician or   their representative will relay the results to you with their   recommendations at their soonest availability.  Thank you,  Your health is very important to us during the Covid Crisis. Following your   procedure today, you will receive a daily text for 2 weeks asking about   signs or symptoms of Covid 19.  Please respond to this text when you   receive it so we can follow up and keep you as safe as possible.   RESTRICTIONS:  During your procedure today, you received medications for sedation.  These   medications may affect your judgment, balance and coordination.  Therefore,   for 24 hours, you have the following restrictions:   - DO NOT drive a car, operate machinery, make legal/financial decisions,   sign important papers or drink alcohol.    ACTIVITY:  Today: no heavy lifting, straining or running due to procedural   sedation/anesthesia.  The following day: return to full activity including work.  DIET:  Eat and drink normally unless instructed otherwise.     TREATMENT FOR COMMON SIDE EFFECTS:  - Mild abdominal pain, nausea, belching, bloating or excessive gas:  rest,   eat lightly and use a heating pad.  - Sore Throat: treat with throat lozenges and/or gargle with warm salt   water.  - Because air was used during the procedure, expelling large amounts of air   from your rectum or belching is normal.  - If a bowel prep was taken, you may not have a bowel movement for 1-3 days.    This is normal.  SYMPTOMS TO WATCH FOR AND REPORT TO YOUR PHYSICIAN:  1. Abdominal pain or bloating, other than gas  cramps.  2. Chest pain.  3. Back pain.  4. Signs of infection such as: chills or fever occurring within 24 hours   after the procedure.  5. Rectal bleeding, which would show as bright red, maroon, or black stools.   (A tablespoon of blood from the rectum is not serious, especially if   hemorrhoids are present.)  6. Vomiting.  7. Weakness or dizziness.  GO DIRECTLY TO THE NEAREST EMERGENCY ROOM IF YOU HAVE ANY OF THE FOLLOWING:      Difficulty breathing              Chills and/or fever over 101 F   Persistent vomiting and/or vomiting blood   Severe abdominal pain   Severe chest pain   Black, tarry stools   Bleeding- more than one tablespoon   Any other symptom or condition that you feel may need urgent attention  Your doctor recommends these additional instructions:  If any biopsies were taken, your doctors clinic will contact you in 1 to 2   weeks with any results.  - Discharge patient to home.   - Patient has a contact number available for emergencies.  The signs and   symptoms of potential delayed complications were discussed with the   patient.  Return to normal activities tomorrow.  Written discharge   instructions were provided to the patient.   - Resume previous diet.   - Continue present medications.   - Await pathology results.   - Repeat colonoscopy in 1 year for surveillance.   - Avoid NSAIDs (ibuprofen, aleve, naproxen, BC / Goodys, aspirin etc) for 10   days; Aspirin is OK to continue if indicated for cardiovascular   protection.  For questions, problems or results please call your physician - Uli Prado MD.  EMERGENCY PHONE NUMBER: [phone],  LAB RESULTS: [phone]  IF A COMPLICATION OR EMERGENCY SITUATION ARISES AND YOU ARE UNABLE TO REACH   YOUR PHYSICIAN - GO DIRECTLY TO THE EMERGENCY ROOM.  Uli Prado MD  3/5/2024 12:47:50 PM  This report has been verified and signed electronically.  Dear patient,  As a result of recent federal legislation (The Federal Cures Act), you may    receive lab or pathology results from your procedure in your [facility]   account before your physician is able to contact you. Your physician or   their representative will relay the results to you with their   recommendations at their soonest availability.  Thank you,  PROVATION

## 2024-03-05 NOTE — H&P
Short Stay Endoscopy History and Physical    PCP - Caitlyn Fong MD    Procedure - Colonoscopy  ASA - per anesthesia  Mallampati - per anesthesia  History of Anesthesia problems - no  Family history Anesthesia problems - no   Plan of anesthesia - General    HPI:  This is a 68 y.o. male here for evaluation of : personal history of colon polyps      ROS:  Constitutional: No fevers, chills, No weight loss  CV: No chest pain  Pulm: No cough, No shortness of breath  GI: see HPI  Derm: No rash    Medical History:  has a past medical history of Anxiety (11/20/2015), Combined hyperlipidemia (11/23/2012), HEARING LOSS, Major depressive disorder, recurrent, moderate (11/23/2012), Obesity (BMI 30.0-34.9) (11/20/2015), BARAK on CPAP (11/20/2015), Prediabetes (11/23/2016), and Smoker (05/02/2013).    Surgical History:  has a past surgical history that includes Colonoscopy (N/A, 7/12/2019); sleep endoscopy, drug-induced (N/A, 6/13/2023); Nasal septoplasty (N/A, 9/19/2023); and Nasal turbinate reduction (Bilateral, 9/19/2023).    Family History: family history includes Heart disease in his mother.. Otherwise no colon cancer, inflammatory bowel disease, or GI malignancies.    Social History:  reports that he has been smoking cigarettes. He has a 0.5 pack-year smoking history. He has never used smokeless tobacco. He reports current alcohol use. He reports that he does not use drugs.    Review of patient's allergies indicates:  No Known Allergies    Medications:   Medications Prior to Admission   Medication Sig Dispense Refill Last Dose    ALPRAZolam (XANAX) 0.5 MG tablet Take 1 tablet (0.5 mg total) by mouth every evening. 90 tablet 1 Past Week    atorvastatin (LIPITOR) 40 MG tablet Take 1 tablet (40 mg total) by mouth once daily. 90 tablet 3 Past Week    buPROPion (WELLBUTRIN XL) 300 MG 24 hr tablet Take 1 tablet (300 mg total) by mouth once daily. 90 tablet 4 Past Week    cholecalciferol, vitamin D3, (VITAMIN D3) 50 mcg  (2,000 unit) Cap capsule Take 1 capsule (2,000 Units total) by mouth once daily. 100 capsule 1 Past Week    ergocalciferol (ERGOCALCIFEROL) 50,000 unit Cap Take 1 capsule (50,000 Units total) by mouth every 7 days. 12 capsule 4 Past Week    fluticasone propionate (FLONASE) 50 mcg/actuation nasal spray 2 sprays (100 mcg total) by Each Nostril route once daily. 48 g 4 Past Week    metFORMIN (GLUCOPHAGE-XR) 500 MG ER 24hr tablet Take 1 tablet (500 mg total) by mouth 2 (two) times daily with meals. 180 tablet 4 Past Week    nicotine (NICODERM CQ) 14 mg/24 hr Place 1 patch onto the skin once daily. 28 patch 0 Past Month    nicotine polacrilex 2 MG Lozg Take 1 lozenge (2 mg total) by mouth as needed (1-2 per hour in the place of cigarette (5-10 daily max) (mini). 144 lozenge 0 Past Week    ondansetron (ZOFRAN-ODT) 8 MG TbDL Dissolve 1 tablet (8 mg total) by mouth every 12 (twelve) hours as needed (nausea). 10 tablet 0 Past Week    sertraline (ZOLOFT) 100 MG tablet Take 1 tablet (100 mg total) by mouth once daily. 90 tablet 3 Past Week    sildenafiL (VIAGRA) 100 MG tablet Take 1 tablet (100 mg total) by mouth daily as needed for Erectile Dysfunction. 9 tablet 11 Past Week    sod sulf-pot chloride-mag sulf (SUTAB) 1.479-0.188- 0.225 gram tablet Take 12 tablets by mouth once daily. Take according to package instructions with indicated amount of water. 24 tablet 0 3/5/2024    tamsulosin (FLOMAX) 0.4 mg Cap TAKE TWO CAPSULES BY MOUTH DAILY IN THE EVENING. Strength: 0.4 mg 180 capsule 3 Past Week    aspirin 81 MG Chew Take 1 tablet (81 mg total) by mouth once daily.       HYDROcodone-acetaminophen (NORCO) 5-325 mg per tablet Take 1 tablet by mouth every 6 (six) hours as needed for Pain. 20 tablet 0 More than a month         Physical Exam:    Vital Signs:   Vitals:    03/05/24 1007   BP: 113/77   Pulse: 61   Resp: 18   Temp: 97.9 °F (36.6 °C)       General Appearance: Well appearing in no acute distress  Eyes:    No scleral  icterus  ENT: Neck supple, Lips, mucosa, and tongue normal; teeth and gums normal  Abdomen: Soft, non tender, non distended with positive bowel sounds. No hepatosplenomegaly, ascites, or mass.  Extremities: 2+ pulses, no clubbing, cyanosis or edema  Skin: No rash      Labs:  Lab Results   Component Value Date    WBC 9.59 09/12/2023    HGB 14.3 09/12/2023    HCT 44.6 09/12/2023     09/12/2023    CHOL 166 10/20/2023    TRIG 211 (H) 10/20/2023    HDL 31 (L) 10/20/2023    ALT 25 10/20/2023    AST 23 10/20/2023     10/20/2023    K 4.2 10/20/2023     10/20/2023    CREATININE 1.1 10/20/2023    BUN 17 10/20/2023    CO2 25 10/20/2023    TSH 3.275 04/24/2023    PSA 3.6 04/24/2023    HGBA1C 5.9 (H) 10/20/2023       I have explained the risks and benefits of endoscopy procedures to the patient including but not limited to bleeding, perforation, infection, and death.  The patient was asked if they understand and allowed to ask any further questions to their satisfaction.    Uli Prado MD

## 2024-03-05 NOTE — LETTER
January 11, 2024    Stef Francotressa Pool  [address] [place] [address]                [address]  [place] [address]  Phone: [phone]  Fax: [phone] Dear Mr. Pool:    We have attempted to contact you to schedule a colonoscopy that was ordered by your doctor. Please contact the office to schedule at [phone].      If you have any questions or concerns, please don't hesitate to call.    Sincerely,        Dolores Grady MA

## 2024-03-05 NOTE — ANESTHESIA PREPROCEDURE EVALUATION
03/05/2024  Stef Pool is a 68 y.o., male   for screening colonoscopy    Hx of anesthetics in past without complications  NPO>8 hours  No food or drug allergies  Amenable to proceed with scheduled procedure    Patient Active Problem List   Diagnosis    Carpal tunnel syndrome on both sides    Combined hyperlipidemia    Major depressive disorder, recurrent, moderate    Medication management    Long-term use of aspirin therapy    BARAK on CPAP    Class 1 obesity due to excess calories with serious comorbidity and body mass index (BMI) of 30.0 to 30.9 in adult    Anxiety    Prediabetes    Tobacco abuse counseling    Nuclear sclerosis, bilateral    Vitamin D deficiency    History of colon polyps    Varicose veins of leg with pain, bilateral    Venous insufficiency of lower extremity    Tobacco abuse    [place]ine cardiac risk >20% in next 10 years    H/O endoscopic sinus surgery       Past Medical History:   Diagnosis Date    Anxiety 11/20/2015    Combined hyperlipidemia 11/23/2012    HEARING LOSS     Major depressive disorder, recurrent, moderate 11/23/2012    Obesity (BMI 30.0-34.9) 11/20/2015    diet and exercise changes discussed     BARAK on CPAP 11/20/2015    uses at least 4 hours nightly     Prediabetes 11/23/2016    making dietary and exercise changes and staring Metformin 500 mg XR daily.  recheck in 3 months    Smoker 05/02/2013       ECHO: Results for orders placed during the hospital encounter of 11/29/21    Echo    Interpretation Summary  · The left ventricle is normal in size with normal systolic function.  · The estimated ejection fraction is 60%.  · Normal left ventricular diastolic function.  · The estimated PA systolic pressure is 26 mmHg.  · With normal right ventricular systolic function.  · Intermediate central venous pressure (8 mmHg).      Body mass index is 31.38 kg/m².    Tobacco Use:  High Risk (3/5/2024)    Patient History     Smoking Tobacco Use: Every Day     Smokeless Tobacco Use: Never     Passive Exposure: Not on file       Social History     Substance and Sexual Activity   Drug Use No        Alcohol Use: Not on file       Review of patient's allergies indicates:  No Known Allergies      Airway:  No value filed.         Pre-op Assessment    I have reviewed the Patient Summary Reports.     I have reviewed the Nursing Notes. I have reviewed the NPO Status.   I have reviewed the Medications.     Review of Systems  Anesthesia Hx:  No problems with previous Anesthesia   History of prior surgery of interest to airway management or planning:          Denies Family Hx of Anesthesia complications.    Denies Personal Hx of Anesthesia complications.                    Social:  Smoker       EENT/Dental:  chronic allergic rhinitis  Eyes: Visual Impairment   Has S/P Extraction - Bilateral Catarract                  Cardiovascular:     Hypertension          PVD hyperlipidemia            Chronic Venous Insufficiency, Varicose Veins                  Pulmonary:   COPD     Sleep Apnea, CPAP                Hepatic/GI:  Bowel Prep.                Psych:  Psychiatric History anxiety depression                   Anesthesia Plan  Type of Anesthesia, risks & benefits discussed:    Anesthesia Type: Gen Natural Airway, MAC  Intra-op Monitoring Plan: Standard ASA Monitors  Post Op Pain Control Plan: multimodal analgesia and IV/PO Opioids PRN  Induction:  IV  Informed Consent: Informed consent signed with the Patient and all parties understand the risks and agree with anesthesia plan.  All questions answered. Patient consented to blood products? No  ASA Score: 3    Ready For Surgery From Anesthesia Perspective.     .

## 2024-03-05 NOTE — TRANSFER OF CARE
"Anesthesia Transfer of Care Note    Patient: Stef Pool    Procedure(s) Performed: Procedure(s) (LRB):  COLONOSCOPY (N/A)    Patient location: GI    Anesthesia Type: general    Transport from OR: Transported from OR on room air with adequate spontaneous ventilation    Post pain: adequate analgesia    Post assessment: no apparent anesthetic complications and tolerated procedure well    Post vital signs: stable    Level of consciousness: responds to stimulation and awake    Nausea/Vomiting: no nausea/vomiting    Complications: none    Transfer of care protocol was followed      Last vitals: Visit Vitals  BP (!) 101/55   Pulse (!) 50   Temp 36.5 °C (97.7 °F)   Resp 16   Ht 5' 11" (1.803 m)   Wt 102.1 kg (225 lb)   SpO2 96%   BMI 31.38 kg/m²     "

## 2024-03-06 LAB
FINAL PATHOLOGIC DIAGNOSIS: NORMAL
GROSS: NORMAL
Lab: NORMAL

## 2024-04-19 ENCOUNTER — LAB VISIT (OUTPATIENT)
Dept: LAB | Facility: HOSPITAL | Age: 69
End: 2024-04-19
Attending: FAMILY MEDICINE
Payer: MEDICARE

## 2024-04-19 DIAGNOSIS — Z12.5 SCREENING FOR MALIGNANT NEOPLASM OF PROSTATE: ICD-10-CM

## 2024-04-19 DIAGNOSIS — Z79.899 MEDICATION MANAGEMENT: ICD-10-CM

## 2024-04-19 DIAGNOSIS — R73.03 PREDIABETES: ICD-10-CM

## 2024-04-19 DIAGNOSIS — E55.9 VITAMIN D DEFICIENCY: ICD-10-CM

## 2024-04-19 LAB
25(OH)D3+25(OH)D2 SERPL-MCNC: 66 NG/ML (ref 30–96)
ALBUMIN SERPL BCP-MCNC: 3.8 G/DL (ref 3.5–5.2)
ALP SERPL-CCNC: 128 U/L (ref 55–135)
ALT SERPL W/O P-5'-P-CCNC: 21 U/L (ref 10–44)
ANION GAP SERPL CALC-SCNC: 8 MMOL/L (ref 8–16)
AST SERPL-CCNC: 20 U/L (ref 10–40)
BASOPHILS # BLD AUTO: 0.05 K/UL (ref 0–0.2)
BASOPHILS NFR BLD: 0.5 % (ref 0–1.9)
BILIRUB SERPL-MCNC: 0.5 MG/DL (ref 0.1–1)
BUN SERPL-MCNC: 14 MG/DL (ref 8–23)
CALCIUM SERPL-MCNC: 9.7 MG/DL (ref 8.7–10.5)
CHLORIDE SERPL-SCNC: 109 MMOL/L (ref 95–110)
CHOLEST SERPL-MCNC: 156 MG/DL (ref 120–199)
CHOLEST/HDLC SERPL: 5.2 {RATIO} (ref 2–5)
CO2 SERPL-SCNC: 23 MMOL/L (ref 23–29)
COMPLEXED PSA SERPL-MCNC: 3.2 NG/ML (ref 0–4)
CREAT SERPL-MCNC: 1 MG/DL (ref 0.5–1.4)
DIFFERENTIAL METHOD BLD: NORMAL
EOSINOPHIL # BLD AUTO: 0.5 K/UL (ref 0–0.5)
EOSINOPHIL NFR BLD: 5.1 % (ref 0–8)
ERYTHROCYTE [DISTWIDTH] IN BLOOD BY AUTOMATED COUNT: 14.5 % (ref 11.5–14.5)
EST. GFR  (NO RACE VARIABLE): >60 ML/MIN/1.73 M^2
ESTIMATED AVG GLUCOSE: 117 MG/DL (ref 68–131)
GLUCOSE SERPL-MCNC: 94 MG/DL (ref 70–110)
HBA1C MFR BLD: 5.7 % (ref 4–5.6)
HCT VFR BLD AUTO: 46.1 % (ref 40–54)
HDLC SERPL-MCNC: 30 MG/DL (ref 40–75)
HDLC SERPL: 19.2 % (ref 20–50)
HGB BLD-MCNC: 14.8 G/DL (ref 14–18)
IMM GRANULOCYTES # BLD AUTO: 0.04 K/UL (ref 0–0.04)
IMM GRANULOCYTES NFR BLD AUTO: 0.4 % (ref 0–0.5)
LDLC SERPL CALC-MCNC: 70.2 MG/DL (ref 63–159)
LYMPHOCYTES # BLD AUTO: 1.8 K/UL (ref 1–4.8)
LYMPHOCYTES NFR BLD: 19.5 % (ref 18–48)
MCH RBC QN AUTO: 28.5 PG (ref 27–31)
MCHC RBC AUTO-ENTMCNC: 32.1 G/DL (ref 32–36)
MCV RBC AUTO: 89 FL (ref 82–98)
MONOCYTES # BLD AUTO: 0.7 K/UL (ref 0.3–1)
MONOCYTES NFR BLD: 6.9 % (ref 4–15)
NEUTROPHILS # BLD AUTO: 6.4 K/UL (ref 1.8–7.7)
NEUTROPHILS NFR BLD: 67.6 % (ref 38–73)
NONHDLC SERPL-MCNC: 126 MG/DL
NRBC BLD-RTO: 0 /100 WBC
PLATELET # BLD AUTO: 193 K/UL (ref 150–450)
PMV BLD AUTO: 11.6 FL (ref 9.2–12.9)
POTASSIUM SERPL-SCNC: 3.9 MMOL/L (ref 3.5–5.1)
PROT SERPL-MCNC: 6.7 G/DL (ref 6–8.4)
RBC # BLD AUTO: 5.19 M/UL (ref 4.6–6.2)
SODIUM SERPL-SCNC: 140 MMOL/L (ref 136–145)
TRIGL SERPL-MCNC: 279 MG/DL (ref 30–150)
TSH SERPL DL<=0.005 MIU/L-ACNC: 2.3 UIU/ML (ref 0.4–4)
WBC # BLD AUTO: 9.44 K/UL (ref 3.9–12.7)

## 2024-04-19 PROCEDURE — 82306 VITAMIN D 25 HYDROXY: CPT | Performed by: FAMILY MEDICINE

## 2024-04-19 PROCEDURE — 84153 ASSAY OF PSA TOTAL: CPT | Performed by: FAMILY MEDICINE

## 2024-04-19 PROCEDURE — 36415 COLL VENOUS BLD VENIPUNCTURE: CPT | Performed by: FAMILY MEDICINE

## 2024-04-19 PROCEDURE — 80061 LIPID PANEL: CPT | Performed by: FAMILY MEDICINE

## 2024-04-19 PROCEDURE — 83036 HEMOGLOBIN GLYCOSYLATED A1C: CPT | Performed by: FAMILY MEDICINE

## 2024-04-19 PROCEDURE — 84443 ASSAY THYROID STIM HORMONE: CPT | Performed by: FAMILY MEDICINE

## 2024-04-19 PROCEDURE — 85025 COMPLETE CBC W/AUTO DIFF WBC: CPT | Performed by: FAMILY MEDICINE

## 2024-04-19 PROCEDURE — 80053 COMPREHEN METABOLIC PANEL: CPT | Performed by: FAMILY MEDICINE

## 2024-04-29 ENCOUNTER — OFFICE VISIT (OUTPATIENT)
Dept: FAMILY MEDICINE | Facility: CLINIC | Age: 69
End: 2024-04-29
Payer: MEDICARE

## 2024-04-29 VITALS
DIASTOLIC BLOOD PRESSURE: 56 MMHG | HEART RATE: 66 BPM | BODY MASS INDEX: 30.86 KG/M2 | HEIGHT: 71 IN | TEMPERATURE: 98 F | WEIGHT: 220.44 LBS | SYSTOLIC BLOOD PRESSURE: 110 MMHG | OXYGEN SATURATION: 95 %

## 2024-04-29 DIAGNOSIS — Z86.010 HISTORY OF COLON POLYPS: ICD-10-CM

## 2024-04-29 DIAGNOSIS — F33.1 MAJOR DEPRESSIVE DISORDER, RECURRENT, MODERATE: ICD-10-CM

## 2024-04-29 DIAGNOSIS — I87.2 VENOUS INSUFFICIENCY OF LOWER EXTREMITY: ICD-10-CM

## 2024-04-29 DIAGNOSIS — Z72.0 TOBACCO ABUSE: ICD-10-CM

## 2024-04-29 DIAGNOSIS — Z23 NEED FOR TDAP VACCINATION: ICD-10-CM

## 2024-04-29 DIAGNOSIS — G47.33 OSA ON CPAP: ICD-10-CM

## 2024-04-29 DIAGNOSIS — R73.03 PREDIABETES: ICD-10-CM

## 2024-04-29 DIAGNOSIS — Z79.899 MEDICATION MANAGEMENT: ICD-10-CM

## 2024-04-29 DIAGNOSIS — E55.9 VITAMIN D DEFICIENCY: ICD-10-CM

## 2024-04-29 DIAGNOSIS — Z98.890 H/O ENDOSCOPIC SINUS SURGERY: ICD-10-CM

## 2024-04-29 DIAGNOSIS — E66.09 CLASS 1 OBESITY DUE TO EXCESS CALORIES WITH SERIOUS COMORBIDITY AND BODY MASS INDEX (BMI) OF 30.0 TO 30.9 IN ADULT: ICD-10-CM

## 2024-04-29 DIAGNOSIS — Z91.89 FRAMINGHAM CARDIAC RISK >20% IN NEXT 10 YEARS: ICD-10-CM

## 2024-04-29 DIAGNOSIS — F41.9 ANXIETY: ICD-10-CM

## 2024-04-29 DIAGNOSIS — Z79.82 LONG-TERM USE OF ASPIRIN THERAPY: ICD-10-CM

## 2024-04-29 DIAGNOSIS — Z00.00 ROUTINE GENERAL MEDICAL EXAMINATION AT A HEALTH CARE FACILITY: Primary | ICD-10-CM

## 2024-04-29 DIAGNOSIS — E78.2 COMBINED HYPERLIPIDEMIA: ICD-10-CM

## 2024-04-29 PROCEDURE — 1101F PT FALLS ASSESS-DOCD LE1/YR: CPT | Mod: CPTII,S$GLB,, | Performed by: FAMILY MEDICINE

## 2024-04-29 PROCEDURE — 1160F RVW MEDS BY RX/DR IN RCRD: CPT | Mod: CPTII,S$GLB,, | Performed by: FAMILY MEDICINE

## 2024-04-29 PROCEDURE — 3008F BODY MASS INDEX DOCD: CPT | Mod: CPTII,S$GLB,, | Performed by: FAMILY MEDICINE

## 2024-04-29 PROCEDURE — 3044F HG A1C LEVEL LT 7.0%: CPT | Mod: CPTII,S$GLB,, | Performed by: FAMILY MEDICINE

## 2024-04-29 PROCEDURE — 3078F DIAST BP <80 MM HG: CPT | Mod: CPTII,S$GLB,, | Performed by: FAMILY MEDICINE

## 2024-04-29 PROCEDURE — 3074F SYST BP LT 130 MM HG: CPT | Mod: CPTII,S$GLB,, | Performed by: FAMILY MEDICINE

## 2024-04-29 PROCEDURE — 3288F FALL RISK ASSESSMENT DOCD: CPT | Mod: CPTII,S$GLB,, | Performed by: FAMILY MEDICINE

## 2024-04-29 PROCEDURE — 99397 PER PM REEVAL EST PAT 65+ YR: CPT | Mod: S$GLB,,, | Performed by: FAMILY MEDICINE

## 2024-04-29 PROCEDURE — 1159F MED LIST DOCD IN RCRD: CPT | Mod: CPTII,S$GLB,, | Performed by: FAMILY MEDICINE

## 2024-04-29 PROCEDURE — 99999 PR PBB SHADOW E&M-EST. PATIENT-LVL V: CPT | Mod: PBBFAC,,, | Performed by: FAMILY MEDICINE

## 2024-04-29 PROCEDURE — 1126F AMNT PAIN NOTED NONE PRSNT: CPT | Mod: CPTII,S$GLB,, | Performed by: FAMILY MEDICINE

## 2024-04-29 NOTE — PROGRESS NOTES
Office Visit    Patient Name: Stef Pool    : 1955  MRN: [id number]    Subjective:  Stef is a 68 y.o. male who presents today for:    Annual visit:    Last seen by me for follow up of chronic confditions  10/27/23    ENT 24 -BARAK consult: .he did do better with mask after septo regarding pressure so hoping a different mask that can stay on his face better can work but if not would recommend inspire   He reports today he is still waiting for his new mask. He reports that there was some issues with changing insurance, but he should be receiving it soon. He is currently sleeping well and has no new complaints regarding his BARAK    Skin Check 23     ENT 23: s/p septoplasty and turbinate reduction on .   Doing well postop. All questions answered.   Not sure if has autopap or if pressure would need adjudstment  Still with some congestion in nose so may have issues   No flonse for now can start in 2 weeks if still a lot of congestion   Consider inspire if fails capp again   Follow up 4-6 weeks recheck nose         67 yo male here for his annual visit, lab review, and monitoring of chronic conditions including obesity, prediabetes, BARAK on CPAP, venous insufficiency, hyperlipidemia (with ongoing triglyceride elevation and low HDL), history of tobacco abuse, anxiety and depression (Zoloft 100, Wellbutrin 300XL, and Xanax 0.5 prn).     Seen by Dr Morris 21 re: venous insufficiency/varicose veins:   Compression stockings 20-30 mmHg  Exercise as tolerated    US BLE  22:   Impression:   1. Hemodynamically significant venous reflux within the left GSV and popliteal veins as well as involving bilateral accessory calf veins.   2. There is no evidence of bilateral lower extremity deep venous thrombosis.     He retired from [employer] 2021.    His daughter had a baby boy 2021-- he and his wife have helped with childcare.   He enjoyed an "[employer]uise 2022 and enjoys traveling  to [place].      Labs 4/19/24: normal CMP/B12/Magnesium, LDL 70.2 and triglycerides worsened to 279 (from 211), A1c decreased from 5.9--> 5.7.   Taking Metformin 500 mg XR BID. Vitamin D 58 4/24/23 on 50,000 IU D2 Q7days PLUS 2,000 IU D3 DAILY.       He has been feeling over all well.   General mood outlook is good-- helped with daily zoloft +Wellbutrin. Takes prn xanax for anxiety (generally once a day before going to bed).   Bladder emptying stable on Flomax.   Smoking Cessation Update: was smoke free since 7/14/19, BUT HE STARTED SMOKING AGAIN IN October 2021. Working extensively with smoking cessation-- currently smoking about 3/4 PPD, quit temporarily after sinus surgery. Not currently on nicotine patches or medication for smoking cessation. Having difficulty quitting because his wife also smokes which he states makes it difficult to quit.     GENERAL LIFESTYLE:   Diet: FAIR-- drinking fair amount of water and trying to cut out soft drinks (drinks one a day), a milk daily, limiting sugar in coffee, but he could do better meal prepping to incorporate more vegetables, though better about eating at home.   Exercise: walking about 3 days per week, trying to be more active  Sleep: good-- at least 7 hours nightly-- gets up to go to the bathroom once on average nightly, sleep is much better since sinus surgery  Weight: GENERALLY STABLE & DOWN ABOUT 5 LB OVER THE LAST FEW MONTHS TO BMI OF 30.7     Screening tests: colonoscopy 3/5/24 repeat in one year, PSA 3.2 4/19/24, hep C neg 11/20/2015, HIV (-) 8/7/20, Abdominal ultrasound for AAA shows no evidence of aneurysm 10/27/2023     Immunizations: TDaP 5/2/2013 due for booster, FLU shot 10/27/23, SHINGRIX Completed 10/16/20, Pneumovax 23 1/31/2012 (when he was smoking), COVID-19 VACCINE COMPLETED W/ MODERNA BOOSTER 12/22/21- UPDATED FALL 2023 COVID-19 VACCINE Advised, Receieved RSV VACCINE 10/27/23, PREVNAR 20 4/26/23     Eye/Dental: eye [facility] 1/6/23- RTC one year  (planning on scheduling appointment for this year soon), dental UTD    PAST MEDICAL HISTORY, SURGICAL/SOCIAL/FAMILY HISTORY REVIEWED AS PER CHART, WITH PERTINENT FINDINGS INCLUDED IN HISTORY SECTION OF NOTE.     Current Medications    Medication List with Changes/Refills   Current Medications    ALPRAZOLAM (XANAX) 0.5 MG TABLET    Take 1 tablet (0.5 mg total) by mouth every evening.    ASPIRIN 81 MG CHEW    Take 1 tablet (81 mg total) by mouth once daily.    ATORVASTATIN (LIPITOR) 40 MG TABLET    Take 1 tablet (40 mg total) by mouth once daily.    BUPROPION (WELLBUTRIN XL) 300 MG 24 HR TABLET    Take 1 tablet (300 mg total) by mouth once daily.    CHOLECALCIFEROL, VITAMIN D3, (VITAMIN D3) 50 MCG (2,000 UNIT) CAP CAPSULE    Take 1 capsule (2,000 Units total) by mouth once daily.    ERGOCALCIFEROL (ERGOCALCIFEROL) 50,000 UNIT CAP    Take 1 capsule (50,000 Units total) by mouth every 7 days.    FLUTICASONE PROPIONATE (FLONASE) 50 MCG/ACTUATION NASAL SPRAY    2 sprays (100 mcg total) by Each Nostril route once daily.    HYDROCODONE-ACETAMINOPHEN (NORCO) 5-325 MG PER TABLET    Take 1 tablet by mouth every 6 (six) hours as needed for Pain.    METFORMIN (GLUCOPHAGE-XR) 500 MG ER 24HR TABLET    Take 1 tablet (500 mg total) by mouth 2 (two) times daily with meals.    NICOTINE (NICODERM CQ) 14 MG/24 HR    Place 1 patch onto the skin once daily.    NICOTINE POLACRILEX 2 MG LOZG    Take 1 lozenge (2 mg total) by mouth as needed (1-2 per hour in the place of cigarette (5-10 daily max) (mini).    ONDANSETRON (ZOFRAN-ODT) 8 MG TBDL    Dissolve 1 tablet (8 mg total) by mouth every 12 (twelve) hours as needed (nausea).    SERTRALINE (ZOLOFT) 100 MG TABLET    Take 1 tablet (100 mg total) by mouth once daily.    SILDENAFIL (VIAGRA) 100 MG TABLET    Take 1 tablet (100 mg total) by mouth daily as needed for Erectile Dysfunction.    TAMSULOSIN (FLOMAX) 0.4 MG CAP    TAKE TWO CAPSULES BY MOUTH DAILY IN THE EVENING. Strength: 0.4 mg  "  Discontinued Medications    SOD SULF-POT CHLORIDE-MAG SULF (SUTAB) 1.479-0.188- 0.225 GRAM TABLET    Take 12 tablets by mouth once daily. Take according to package instructions with indicated amount of water.       Allergies   Review of patient's allergies indicates:  No Known Allergies      Review of Systems (Pertinent positives)  Review of Systems   Constitutional:  Negative for chills and fever.   HENT:  Negative for congestion and sore throat.    Eyes:  Negative for redness.   Respiratory:  Negative for chest tightness and shortness of breath.    Cardiovascular:  Negative for chest pain and palpitations.   Gastrointestinal:  Negative for abdominal pain, constipation and diarrhea.   Genitourinary:  Negative for dysuria and hematuria.   Musculoskeletal:  Negative for arthralgias and back pain.   Skin:  Negative for rash.   Neurological:  Negative for dizziness and light-headedness.   Psychiatric/Behavioral:  Negative for agitation and confusion.        BP (!) 110/56 (BP Location: Left arm, Patient Position: Sitting, BP Method: Large (Manual))   Pulse 66   Temp 98.4 °F (36.9 °C)   Ht 5' 11" (1.803 m)   Wt 100 kg (220 lb 7.4 oz)   SpO2 95%   BMI 30.75 kg/m²     Physical Exam  Constitutional:       Appearance: Normal appearance.   HENT:      Head: Normocephalic and atraumatic.      Mouth/Throat:      Mouth: Mucous membranes are moist.      Pharynx: Oropharynx is clear.   Eyes:      Conjunctiva/sclera: Conjunctivae normal.   Cardiovascular:      Rate and Rhythm: Normal rate and regular rhythm.   Pulmonary:      Effort: Pulmonary effort is normal.      Breath sounds: Normal breath sounds.   Abdominal:      General: Abdomen is flat.      Palpations: Abdomen is soft.   Musculoskeletal:         General: Normal range of motion.      Comments: Palpable varicose veins in both lower extremities, but no associated edema or pain   Skin:     General: Skin is warm and dry.   Neurological:      General: No focal deficit " present.      Mental Status: He is alert and oriented to person, place, and time.   Psychiatric:         Mood and Affect: Mood normal.         Behavior: Behavior normal.         Assessment/Plan:  Stef Pool is a 68 y.o. male who presents today for :        ICD-10-CM ICD-9-CM    1. Routine general medical examination at a health care facility  Z00.00 V70.0       2. Class 1 obesity due to excess calories with serious comorbidity and body mass index (BMI) of 30.0 to 30.9 in adult  E66.09 278.00     Z68.30 V85.30       3. BARAK on CPAP  G47.33 327.23       4. H/O endoscopic sinus surgery  Z98.890 V15.29       5. Tobacco abuse  Z72.0 305.1 Ambulatory referral/consult to Smoking Cessation Program      6. Prediabetes  R73.03 790.29 Hemoglobin A1C      Comprehensive Metabolic Panel      Lipid Panel      7. Combined hyperlipidemia  E78.2 272.2 Hemoglobin A1C      Comprehensive Metabolic Panel      Lipid Panel      8. [place] cardiac risk >20% in next 10 years  Z91.89 V49.89       9. Long-term use of aspirin therapy  Z79.82 V58.66       10. Major depressive disorder, recurrent, moderate  F33.1 296.32       11. Anxiety  F41.9 300.00       12. Venous insufficiency of lower extremity  I87.2 459.81       13. History of colon polyps  Z86.010 V12.72     Colonosocpy 3/5/24: Repeat colonoscopy in 1 year for surveillance due to multiple colon polyps      14. Vitamin D deficiency  E55.9 268.9       15. Medication management  Z79.899 V58.69 Hemoglobin A1C      Comprehensive Metabolic Panel      Lipid Panel      Vitamin B12      16. Need for Tdap vaccination  Z23 V06.1         ADVISED ON DIET/EXERCISE/SLEEP, ROUTINE EYE/DENTAL EXAMS, AND THE IMPORTANCE OF KEEPING UP WITH APPROPRIATE SCREENING TESTS BASED ON AGE AND RISK FACTORS.  COVID-19 VACCINE COMPLETED W/ MODERNA BOOSTER 12/22/21-- UPDATED FALL 2023 COVID-19 VACCINE ADVISED, RSV VACCINE RECEIVED 10/27/23, TDaP booster Advised, COLONOSCOPY Due next year (Received one 3/25/24,  follow up one year), PSA stable    H/O COLONIC POLYPS:  Due for repeat colonoscopy in one year     OBESITY/OVERWEIGHT BMI:  Counseled on diet/exercise habits and the importance of continued focus on weight loss. Plans to continue walking/bike riding. Doing a bit better with cutting back on sweets, but struggling with soft drinks and advised to cut back (Triglycerides elevated to 279 from 211). Recheck In 6 months     OBSTRUCTIVE SLEEP APNEA, HISTORY OF ENDOSCOPIC SINUS SURGERY:  Doing well following sinus surgery September 2023. Much better and sleep has improved.  Following up with ENT.  Has resumed his CPAP treatment and is currently waiting to receive a better fitting mask.  Inspire considered, but currently doing well with CPAP     PREDIABETES: A1c controlled on metformin  mg b.i.d and with lifestyle improvements. Currently 5.7 (improved from 5.9), Continue current regimen and recheck 6 months.      HLD:  LDL at goal of less than 100 on Lipitor 40, Continue diet/exercise/weight loss efforts especially given triglyceride elevation, and Recheck in 6 months     DEPRESSION/ANXIETY: Anxiety stable on Zoloft 100 mg daily with 1 low-dose Xanax daily.  Notes mood improvement with Adding Wellbutrin 300 XL daily but this has not really helped with smoking cessation.     SMOKER:  Unfortunately after quitting smoking for about 18 months he resumed smoking again around October 2020.  Working diligently with smoking cessation, continue Wellbutrin 300 XL, prescription for nicotine replacement patches.Lozenges,--did quit temporarily after his sinus surgery but started back again.  Currently smoking about 3/4 pack per day.    URGED TO CONTINUE SMOKING CESSATION EFFORTS and placed referral to smoking cessation- they can work with him on receiving an updated Chantix prescription which has previously helped him to quit     VITAMIN-D DEFICIENCY:  Level now normal on combination of weekly 73583 IU D2 with daily 2000 IU D3.   Yearly monitoring     BILATERAL DISTENDED PAINFUL VARICOSE VEINS: following with Vascular Dr Arturo russell. Currently well managed with no pain or edema. Wearing compression stockings occasionally, ultrasound showed venous reflux of the left lower extremity, doing okay with conservative management for now but can follow-up with vascular as needed. Advised on importance of walking and continuing to manage his weight      BPH:  symptoms controlled on Flomax 0.8 mg nightly, consider urology referral if worsening.  PSA yearly-- stable (mild decrease)--- will monitor     Follow up in about 6 months (around 10/29/2024) for to follow up on lab results, return as needed for new concerns.     Joaquim Palomo MS4  [facility]    I hereby acknowledge that I am relying upon documentation authored by a medical student working under my supervision and further I hereby attest that I have verified the student documentation or findings by personally re-performing the physical exam and medical decision making activities of the Evaluation and Management service to be billed.  Caitlyn Fong

## 2024-06-24 DIAGNOSIS — F41.9 ANXIETY: ICD-10-CM

## 2024-06-24 NOTE — TELEPHONE ENCOUNTER
No care due was identified.  [facility] Embedded Care Due Messages. Reference number: [id number].   6/24/2024 3:38:37 PM CDT

## 2024-06-25 RX ORDER — ALPRAZOLAM 0.5 MG/1
0.5 TABLET ORAL NIGHTLY
Qty: 90 TABLET | Refills: 1 | Status: SHIPPED | OUTPATIENT
Start: 2024-06-25

## 2024-10-23 ENCOUNTER — LAB VISIT (OUTPATIENT)
Dept: LAB | Facility: HOSPITAL | Age: 69
End: 2024-10-23
Attending: FAMILY MEDICINE
Payer: MEDICARE

## 2024-10-23 DIAGNOSIS — E78.2 COMBINED HYPERLIPIDEMIA: ICD-10-CM

## 2024-10-23 DIAGNOSIS — R73.03 PREDIABETES: ICD-10-CM

## 2024-10-23 DIAGNOSIS — Z79.899 MEDICATION MANAGEMENT: ICD-10-CM

## 2024-10-23 LAB
ALBUMIN SERPL BCP-MCNC: 3.8 G/DL (ref 3.5–5.2)
ALP SERPL-CCNC: 105 U/L (ref 40–150)
ALT SERPL W/O P-5'-P-CCNC: 18 U/L (ref 10–44)
ANION GAP SERPL CALC-SCNC: 9 MMOL/L (ref 8–16)
AST SERPL-CCNC: 20 U/L (ref 10–40)
BILIRUB SERPL-MCNC: 0.3 MG/DL (ref 0.1–1)
BUN SERPL-MCNC: 14 MG/DL (ref 8–23)
CALCIUM SERPL-MCNC: 9.4 MG/DL (ref 8.7–10.5)
CHLORIDE SERPL-SCNC: 108 MMOL/L (ref 95–110)
CHOLEST SERPL-MCNC: 139 MG/DL (ref 120–199)
CHOLEST/HDLC SERPL: 4.6 {RATIO} (ref 2–5)
CO2 SERPL-SCNC: 24 MMOL/L (ref 23–29)
CREAT SERPL-MCNC: 1 MG/DL (ref 0.5–1.4)
EST. GFR  (NO RACE VARIABLE): >60 ML/MIN/1.73 M^2
ESTIMATED AVG GLUCOSE: 120 MG/DL (ref 68–131)
GLUCOSE SERPL-MCNC: 100 MG/DL (ref 70–110)
HBA1C MFR BLD: 5.8 % (ref 4–5.6)
HDLC SERPL-MCNC: 30 MG/DL (ref 40–75)
HDLC SERPL: 21.6 % (ref 20–50)
LDLC SERPL CALC-MCNC: 67 MG/DL (ref 63–159)
NONHDLC SERPL-MCNC: 109 MG/DL
POTASSIUM SERPL-SCNC: 4 MMOL/L (ref 3.5–5.1)
PROT SERPL-MCNC: 6.4 G/DL (ref 6–8.4)
SODIUM SERPL-SCNC: 141 MMOL/L (ref 136–145)
TRIGL SERPL-MCNC: 210 MG/DL (ref 30–150)
VIT B12 SERPL-MCNC: 726 PG/ML (ref 210–950)

## 2024-10-23 PROCEDURE — 83036 HEMOGLOBIN GLYCOSYLATED A1C: CPT | Performed by: FAMILY MEDICINE

## 2024-10-23 PROCEDURE — 80053 COMPREHEN METABOLIC PANEL: CPT | Performed by: FAMILY MEDICINE

## 2024-10-23 PROCEDURE — 82607 VITAMIN B-12: CPT | Performed by: FAMILY MEDICINE

## 2024-10-23 PROCEDURE — 80061 LIPID PANEL: CPT | Performed by: FAMILY MEDICINE

## 2024-10-30 ENCOUNTER — OFFICE VISIT (OUTPATIENT)
Dept: FAMILY MEDICINE | Facility: CLINIC | Age: 69
End: 2024-10-30
Payer: MEDICARE

## 2024-10-30 VITALS
DIASTOLIC BLOOD PRESSURE: 66 MMHG | WEIGHT: 212.75 LBS | HEART RATE: 62 BPM | HEIGHT: 70 IN | TEMPERATURE: 98 F | BODY MASS INDEX: 30.46 KG/M2 | OXYGEN SATURATION: 96 % | SYSTOLIC BLOOD PRESSURE: 114 MMHG

## 2024-10-30 DIAGNOSIS — Z23 NEEDS FLU SHOT: ICD-10-CM

## 2024-10-30 DIAGNOSIS — E66.811 CLASS 1 OBESITY DUE TO EXCESS CALORIES WITH SERIOUS COMORBIDITY AND BODY MASS INDEX (BMI) OF 30.0 TO 30.9 IN ADULT: ICD-10-CM

## 2024-10-30 DIAGNOSIS — Z79.82 LONG-TERM USE OF ASPIRIN THERAPY: ICD-10-CM

## 2024-10-30 DIAGNOSIS — Z91.89 FRAMINGHAM CARDIAC RISK >20% IN NEXT 10 YEARS: ICD-10-CM

## 2024-10-30 DIAGNOSIS — I87.2 VENOUS INSUFFICIENCY OF LOWER EXTREMITY: ICD-10-CM

## 2024-10-30 DIAGNOSIS — Z79.899 MEDICATION MANAGEMENT: ICD-10-CM

## 2024-10-30 DIAGNOSIS — Z87.891 PERSONAL HISTORY OF NICOTINE DEPENDENCE: ICD-10-CM

## 2024-10-30 DIAGNOSIS — E78.2 COMBINED HYPERLIPIDEMIA: Primary | ICD-10-CM

## 2024-10-30 DIAGNOSIS — Z12.2 SCREENING FOR LUNG CANCER: ICD-10-CM

## 2024-10-30 DIAGNOSIS — Z86.0100 HISTORY OF COLON POLYPS: ICD-10-CM

## 2024-10-30 DIAGNOSIS — R73.03 PREDIABETES: ICD-10-CM

## 2024-10-30 DIAGNOSIS — H25.13 NUCLEAR SCLEROSIS, BILATERAL: ICD-10-CM

## 2024-10-30 DIAGNOSIS — Z12.5 SCREENING FOR MALIGNANT NEOPLASM OF PROSTATE: ICD-10-CM

## 2024-10-30 DIAGNOSIS — Z80.8 FAMILY HISTORY OF MALIGNANT MELANOMA: ICD-10-CM

## 2024-10-30 DIAGNOSIS — Z98.890 H/O ENDOSCOPIC SINUS SURGERY: ICD-10-CM

## 2024-10-30 DIAGNOSIS — E66.09 CLASS 1 OBESITY DUE TO EXCESS CALORIES WITH SERIOUS COMORBIDITY AND BODY MASS INDEX (BMI) OF 30.0 TO 30.9 IN ADULT: ICD-10-CM

## 2024-10-30 DIAGNOSIS — Z72.0 TOBACCO ABUSE: ICD-10-CM

## 2024-10-30 DIAGNOSIS — J30.2 SEASONAL ALLERGIC RHINITIS, UNSPECIFIED TRIGGER: ICD-10-CM

## 2024-10-30 DIAGNOSIS — F33.1 MAJOR DEPRESSIVE DISORDER, RECURRENT, MODERATE: ICD-10-CM

## 2024-10-30 DIAGNOSIS — Z23 NEED FOR TDAP VACCINATION: ICD-10-CM

## 2024-10-30 DIAGNOSIS — G47.33 OSA ON CPAP: ICD-10-CM

## 2024-10-30 DIAGNOSIS — F41.9 ANXIETY: ICD-10-CM

## 2024-10-30 PROCEDURE — G0008 ADMIN INFLUENZA VIRUS VAC: HCPCS | Mod: S$GLB,,, | Performed by: FAMILY MEDICINE

## 2024-10-30 PROCEDURE — 3008F BODY MASS INDEX DOCD: CPT | Mod: CPTII,S$GLB,, | Performed by: FAMILY MEDICINE

## 2024-10-30 PROCEDURE — 3074F SYST BP LT 130 MM HG: CPT | Mod: CPTII,S$GLB,, | Performed by: FAMILY MEDICINE

## 2024-10-30 PROCEDURE — 1160F RVW MEDS BY RX/DR IN RCRD: CPT | Mod: CPTII,S$GLB,, | Performed by: FAMILY MEDICINE

## 2024-10-30 PROCEDURE — 1101F PT FALLS ASSESS-DOCD LE1/YR: CPT | Mod: CPTII,S$GLB,, | Performed by: FAMILY MEDICINE

## 2024-10-30 PROCEDURE — 3044F HG A1C LEVEL LT 7.0%: CPT | Mod: CPTII,S$GLB,, | Performed by: FAMILY MEDICINE

## 2024-10-30 PROCEDURE — 3288F FALL RISK ASSESSMENT DOCD: CPT | Mod: CPTII,S$GLB,, | Performed by: FAMILY MEDICINE

## 2024-10-30 PROCEDURE — 1126F AMNT PAIN NOTED NONE PRSNT: CPT | Mod: CPTII,S$GLB,, | Performed by: FAMILY MEDICINE

## 2024-10-30 PROCEDURE — 90653 IIV ADJUVANT VACCINE IM: CPT | Mod: S$GLB,,, | Performed by: FAMILY MEDICINE

## 2024-10-30 PROCEDURE — 99999 PR PBB SHADOW E&M-EST. PATIENT-LVL IV: CPT | Mod: PBBFAC,,, | Performed by: FAMILY MEDICINE

## 2024-10-30 PROCEDURE — 3078F DIAST BP <80 MM HG: CPT | Mod: CPTII,S$GLB,, | Performed by: FAMILY MEDICINE

## 2024-10-30 PROCEDURE — 99214 OFFICE O/P EST MOD 30 MIN: CPT | Mod: S$GLB,,, | Performed by: FAMILY MEDICINE

## 2024-10-30 PROCEDURE — 1159F MED LIST DOCD IN RCRD: CPT | Mod: CPTII,S$GLB,, | Performed by: FAMILY MEDICINE

## 2024-10-30 RX ORDER — AZELASTINE 1 MG/ML
1 SPRAY, METERED NASAL 2 TIMES DAILY
COMMUNITY
Start: 2024-06-17 | End: 2024-10-30 | Stop reason: SDUPTHER

## 2024-10-30 RX ORDER — AZELASTINE 1 MG/ML
1 SPRAY, METERED NASAL 2 TIMES DAILY
Qty: 30 ML | Refills: 11 | Status: SHIPPED | OUTPATIENT
Start: 2024-10-30

## 2024-11-01 ENCOUNTER — TELEPHONE (OUTPATIENT)
Dept: OPTOMETRY | Facility: CLINIC | Age: 69
End: 2024-11-01
Payer: MEDICARE

## 2024-12-22 DIAGNOSIS — R73.03 PREDIABETES: ICD-10-CM

## 2024-12-22 DIAGNOSIS — Z71.6 TOBACCO ABUSE COUNSELING: ICD-10-CM

## 2024-12-22 DIAGNOSIS — F33.1 MAJOR DEPRESSIVE DISORDER, RECURRENT, MODERATE: ICD-10-CM

## 2024-12-22 DIAGNOSIS — E78.2 COMBINED HYPERLIPIDEMIA: ICD-10-CM

## 2024-12-22 RX ORDER — METFORMIN HYDROCHLORIDE 500 MG/1
500 TABLET, EXTENDED RELEASE ORAL 2 TIMES DAILY WITH MEALS
Qty: 180 TABLET | Refills: 1 | Status: SHIPPED | OUTPATIENT
Start: 2024-12-22

## 2024-12-22 RX ORDER — ATORVASTATIN CALCIUM 40 MG/1
40 TABLET, FILM COATED ORAL
Qty: 90 TABLET | Refills: 3 | Status: SHIPPED | OUTPATIENT
Start: 2024-12-22

## 2024-12-22 RX ORDER — BUPROPION HYDROCHLORIDE 300 MG/1
300 TABLET ORAL
Qty: 90 TABLET | Refills: 3 | Status: SHIPPED | OUTPATIENT
Start: 2024-12-22

## 2024-12-22 NOTE — TELEPHONE ENCOUNTER
No care due was identified.  [facility] Embedded Care Due Messages. Reference number: [id number].   12/22/2024 8:11:29 AM CST

## 2024-12-24 DIAGNOSIS — F41.9 ANXIETY: ICD-10-CM

## 2024-12-24 DIAGNOSIS — N40.1 BENIGN PROSTATIC HYPERPLASIA WITH URINARY HESITANCY: ICD-10-CM

## 2024-12-24 DIAGNOSIS — R39.11 BENIGN PROSTATIC HYPERPLASIA WITH URINARY HESITANCY: ICD-10-CM

## 2024-12-24 DIAGNOSIS — E55.9 VITAMIN D DEFICIENCY: ICD-10-CM

## 2024-12-24 DIAGNOSIS — F17.200 CURRENT EVERY DAY SMOKER: ICD-10-CM

## 2024-12-24 DIAGNOSIS — J30.2 SEASONAL ALLERGIC RHINITIS, UNSPECIFIED TRIGGER: ICD-10-CM

## 2024-12-24 DIAGNOSIS — F33.1 MAJOR DEPRESSIVE DISORDER, RECURRENT, MODERATE: ICD-10-CM

## 2024-12-24 DIAGNOSIS — Z98.890 H/O ENDOSCOPIC SINUS SURGERY: ICD-10-CM

## 2024-12-24 RX ORDER — TAMSULOSIN HYDROCHLORIDE 0.4 MG/1
CAPSULE ORAL
Qty: 180 CAPSULE | Refills: 3 | Status: SHIPPED | OUTPATIENT
Start: 2024-12-24

## 2024-12-24 RX ORDER — SERTRALINE HYDROCHLORIDE 100 MG/1
100 TABLET, FILM COATED ORAL DAILY
Qty: 90 TABLET | Refills: 3 | Status: SHIPPED | OUTPATIENT
Start: 2024-12-24

## 2024-12-24 RX ORDER — ACETAMINOPHEN 500 MG
2000 TABLET ORAL DAILY
Qty: 100 CAPSULE | Refills: 1 | Status: SHIPPED | OUTPATIENT
Start: 2024-12-24

## 2024-12-24 RX ORDER — ALPRAZOLAM 0.5 MG/1
0.5 TABLET ORAL NIGHTLY
Qty: 90 TABLET | Refills: 1 | Status: SHIPPED | OUTPATIENT
Start: 2024-12-24

## 2024-12-24 RX ORDER — FLUTICASONE PROPIONATE 50 MCG
2 SPRAY, SUSPENSION (ML) NASAL DAILY
Qty: 48 G | Refills: 4 | Status: SHIPPED | OUTPATIENT
Start: 2024-12-24

## 2024-12-24 RX ORDER — SILDENAFIL 100 MG/1
100 TABLET, FILM COATED ORAL DAILY PRN
Qty: 9 TABLET | Refills: 11 | Status: SHIPPED | OUTPATIENT
Start: 2024-12-24 | End: 2025-12-24

## 2024-12-24 RX ORDER — AZELASTINE 1 MG/ML
1 SPRAY, METERED NASAL 2 TIMES DAILY
Qty: 30 ML | Refills: 11 | Status: SHIPPED | OUTPATIENT
Start: 2024-12-24

## 2024-12-24 RX ORDER — ERGOCALCIFEROL 1.25 MG/1
50000 CAPSULE ORAL
Qty: 12 CAPSULE | Refills: 4 | Status: SHIPPED | OUTPATIENT
Start: 2024-12-24

## 2024-12-24 NOTE — TELEPHONE ENCOUNTER
No care due was identified.  Health [facility] Embedded Care Due Messages. Reference number: [id number].   12/24/2024 7:20:21 AM CST

## 2025-02-04 ENCOUNTER — OFFICE VISIT (OUTPATIENT)
Dept: DERMATOLOGY | Facility: CLINIC | Age: 70
End: 2025-02-04
Payer: MEDICARE

## 2025-02-04 DIAGNOSIS — L72.0 EIC (EPIDERMAL INCLUSION CYST): ICD-10-CM

## 2025-02-04 DIAGNOSIS — D22.9 BENIGN NEVUS: ICD-10-CM

## 2025-02-04 DIAGNOSIS — L82.1 SK (SEBORRHEIC KERATOSIS): Primary | ICD-10-CM

## 2025-02-04 DIAGNOSIS — L81.4 LENTIGO: ICD-10-CM

## 2025-02-04 DIAGNOSIS — Z12.83 SCREENING EXAM FOR SKIN CANCER: ICD-10-CM

## 2025-02-04 PROCEDURE — 1126F AMNT PAIN NOTED NONE PRSNT: CPT | Mod: CPTII,S$GLB,, | Performed by: DERMATOLOGY

## 2025-02-04 PROCEDURE — 99213 OFFICE O/P EST LOW 20 MIN: CPT | Mod: S$GLB,,, | Performed by: DERMATOLOGY

## 2025-02-04 PROCEDURE — 1160F RVW MEDS BY RX/DR IN RCRD: CPT | Mod: CPTII,S$GLB,, | Performed by: DERMATOLOGY

## 2025-02-04 PROCEDURE — 99999 PR PBB SHADOW E&M-EST. PATIENT-LVL III: CPT | Mod: PBBFAC,,, | Performed by: DERMATOLOGY

## 2025-02-04 PROCEDURE — 1159F MED LIST DOCD IN RCRD: CPT | Mod: CPTII,S$GLB,, | Performed by: DERMATOLOGY

## 2025-02-04 PROCEDURE — 1101F PT FALLS ASSESS-DOCD LE1/YR: CPT | Mod: CPTII,S$GLB,, | Performed by: DERMATOLOGY

## 2025-02-04 PROCEDURE — 3288F FALL RISK ASSESSMENT DOCD: CPT | Mod: CPTII,S$GLB,, | Performed by: DERMATOLOGY

## 2025-02-04 NOTE — PROGRESS NOTES
Subjective:      Patient ID:  Stef Pool is a 69 y.o. male who presents for   Chief Complaint   Patient presents with    Skin Check     Ubse      History of Present Illness: The patient presents for follow up of skin check.    The patient was last seen on: 7/31/23 for TBSE.  No hx skin cancer    Other skin complaints: no new concerns          Review of Systems   Skin:  Positive for wears hat (rarely). Negative for daily sunscreen use, activity-related sunscreen use and recent sunburn.   Hematologic/Lymphatic: Bruises/bleeds easily (baby aspirin).       Objective:   Physical Exam   Constitutional: He appears well-developed and well-nourished. No distress.   Neurological: He is alert and oriented to person, place, and time. He is not disoriented.   Psychiatric: He has a normal mood and affect.   Skin:   Areas Examined (abnormalities noted in diagram):   Head / Face Inspection Performed  Neck Inspection Performed  Chest / Axilla Inspection Performed  Back Inspection Performed  RUE Inspected  LUE Inspection Performed                 Diagram Legend     Erythematous scaling macule/papule c/w actinic keratosis       Vascular papule c/w angioma      Pigmented verrucoid papule/plaque c/w seborrheic keratosis      Yellow umbilicated papule c/w sebaceous hyperplasia      Irregularly shaped tan macule c/w lentigo     1-2 mm smooth white papules consistent with Milia      Movable subcutaneous cyst with punctum c/w epidermal inclusion cyst      Subcutaneous movable cyst c/w pilar cyst      Firm pink to brown papule c/w dermatofibroma      Pedunculated fleshy papule(s) c/w skin tag(s)      Evenly pigmented macule c/w junctional nevus     Mildly variegated pigmented, slightly irregular-bordered macule c/w mildly atypical nevus      Flesh colored to evenly pigmented papule c/w intradermal nevus       Pink pearly papule/plaque c/w basal cell carcinoma      Erythematous hyperkeratotic cursted plaque c/w SCC      Surgical scar  with no sign of skin cancer recurrence      Open and closed comedones      Inflammatory papules and pustules      Verrucoid papule consistent consistent with wart     Erythematous eczematous patches and plaques     Dystrophic onycholytic nail with subungual debris c/w onychomycosis     Umbilicated papule    Erythematous-base heme-crusted tan verrucoid plaque consistent with inflamed seborrheic keratosis     Erythematous Silvery Scaling Plaque c/w Psoriasis     See annotation      Assessment / Plan:        SK (seborrheic keratosis)  These are benign inherited growths without a malignant potential. Reassurance given to patient. No treatment is necessary.       Benign nevus   - minor problem and chronic.   Reassurance given to patient. No treatment necessary.     Lentigo   - minor problem and chronic.   Reassurance given to patient. No treatment necessary.     EIC (epidermal inclusion cyst)   - minor problem and chronic.   Reassurance given to patient. No treatment necessary.     Screening exam for skin cancer  Upper body skin examination performed today including at least 6 points as noted in physical examination. No lesions suspicious for malignancy noted.    Recommend daily sun protection/avoidance and use of at least SPF 30, broad spectrum sunscreen (OTC drug).              Follow up if symptoms worsen or fail to improve.

## 2025-02-12 ENCOUNTER — TELEPHONE (OUTPATIENT)
Dept: ENDOSCOPY | Facility: HOSPITAL | Age: 70
End: 2025-02-12
Payer: MEDICARE

## 2025-02-17 ENCOUNTER — TELEPHONE (OUTPATIENT)
Dept: ENDOSCOPY | Facility: HOSPITAL | Age: 70
End: 2025-02-17
Payer: MEDICARE

## 2025-02-17 VITALS — WEIGHT: 212 LBS | BODY MASS INDEX: 30.35 KG/M2 | HEIGHT: 70 IN

## 2025-02-17 DIAGNOSIS — Z12.11 SCREEN FOR COLON CANCER: Primary | ICD-10-CM

## 2025-02-17 RX ORDER — SOD SULF/POT CHLORIDE/MAG SULF 1.479 G
12 TABLET ORAL DAILY
Qty: 24 TABLET | Refills: 0 | Status: SHIPPED | OUTPATIENT
Start: 2025-02-17

## 2025-02-17 NOTE — TELEPHONE ENCOUNTER
Referral for procedure from  last procedure report - Pt due for follow up procedure    Spoke to Stef Pool to schedule Colonoscopy       Physician to perform procedure(s) Dr. MIGUEL ANGEL Prado  Date of Procedure (s) 3/6/25  Arrival Time 7:00 AM  Time of Procedure(s) 8:00 AM   Location of Procedure(s) [facility] [address] Floor  Type of Rx Prep sent to patient's pharmacy: [facility]  Instructions provided to patient via [facility]  Patient denies use of blood thinners, GLP-1 medications, and weight loss medications.  The following information was discussed with patient, and patient verbalized understanding:  Screening questionnaire reviewed with patient and complete. If procedure requires anesthesia, a responsible adult needs to be present to accompany the patient home. Appointment details are tentative, especially check-in time. Patient will receive a pre-op call 7 days prior to appointment to confirm check-in time for procedure. If applicable the patient should contact their pharmacy to verify Rx for procedure prep is ready for pick-up. Patient was instructed to call the scheduling department at [phone] if pharmacy states no Rx is available. Patient was also advised to call the endoscopy scheduling department if any questions or concerns arise.       Endoscopy Scheduling Department        
[phone]

## 2025-03-03 ENCOUNTER — TELEPHONE (OUTPATIENT)
Dept: ENDOSCOPY | Facility: HOSPITAL | Age: 70
End: 2025-03-03
Payer: MEDICARE

## 2025-03-03 NOTE — TELEPHONE ENCOUNTER
Left message instructing patient to call dept @ [phone] between 8am-4pm.    Arrival time to be given @ 0700  (Message sent via [facility] portal)

## 2025-03-03 NOTE — TELEPHONE ENCOUNTER
Left message instructing patient to call dept @ [phone] between 8am-4pm.    Arrival time to be given @ 0700 *  (Message sent via [facility] portal)

## 2025-03-05 ENCOUNTER — TELEPHONE (OUTPATIENT)
Dept: ENDOSCOPY | Facility: HOSPITAL | Age: 70
End: 2025-03-05
Payer: MEDICARE

## 2025-03-05 ENCOUNTER — ANESTHESIA EVENT (OUTPATIENT)
Dept: ENDOSCOPY | Facility: HOSPITAL | Age: 70
End: 2025-03-05
Payer: MEDICARE

## 2025-03-05 NOTE — TELEPHONE ENCOUNTER
Left message instructing patient to call dept @ [phone] between 8am-4pm.    Arrival time to be given @ 071  (Message sent via [facility] portal)

## 2025-03-06 ENCOUNTER — HOSPITAL ENCOUNTER (OUTPATIENT)
Facility: HOSPITAL | Age: 70
Discharge: HOME | End: 2025-03-06
Attending: INTERNAL MEDICINE | Admitting: INTERNAL MEDICINE
Payer: MEDICARE

## 2025-03-06 ENCOUNTER — ANESTHESIA (OUTPATIENT)
Dept: ENDOSCOPY | Facility: HOSPITAL | Age: 70
End: 2025-03-06
Payer: MEDICARE

## 2025-03-06 VITALS
SYSTOLIC BLOOD PRESSURE: 113 MMHG | TEMPERATURE: 98 F | BODY MASS INDEX: 30.78 KG/M2 | WEIGHT: 215 LBS | RESPIRATION RATE: 18 BRPM | HEART RATE: 50 BPM | DIASTOLIC BLOOD PRESSURE: 74 MMHG | OXYGEN SATURATION: 97 % | HEIGHT: 70 IN

## 2025-03-06 DIAGNOSIS — Z86.0100 PERSONAL HISTORY OF COLONIC POLYPS: ICD-10-CM

## 2025-03-06 LAB
GLUCOSE SERPL-MCNC: 95 MG/DL (ref 70–110)
POCT GLUCOSE: 95 MG/DL (ref 70–110)

## 2025-03-06 PROCEDURE — 45385 COLONOSCOPY W/LESION REMOVAL: CPT | Mod: PT,,, | Performed by: INTERNAL MEDICINE

## 2025-03-06 PROCEDURE — 63600175 PHARM REV CODE 636 W HCPCS

## 2025-03-06 PROCEDURE — 88305 TISSUE EXAM BY PATHOLOGIST: CPT | Mod: 26,,, | Performed by: PATHOLOGY

## 2025-03-06 PROCEDURE — 27201089 HC SNARE, DISP (ANY): Performed by: INTERNAL MEDICINE

## 2025-03-06 PROCEDURE — 25000003 PHARM REV CODE 250

## 2025-03-06 PROCEDURE — 88305 TISSUE EXAM BY PATHOLOGIST: CPT | Mod: 59 | Performed by: PATHOLOGY

## 2025-03-06 PROCEDURE — 37000009 HC ANESTHESIA EA ADD 15 MINS: Performed by: INTERNAL MEDICINE

## 2025-03-06 PROCEDURE — 45385 COLONOSCOPY W/LESION REMOVAL: CPT | Mod: PT | Performed by: INTERNAL MEDICINE

## 2025-03-06 PROCEDURE — 25000003 PHARM REV CODE 250: Performed by: INTERNAL MEDICINE

## 2025-03-06 PROCEDURE — 37000008 HC ANESTHESIA 1ST 15 MINUTES: Performed by: INTERNAL MEDICINE

## 2025-03-06 PROCEDURE — 82962 GLUCOSE BLOOD TEST: CPT | Performed by: INTERNAL MEDICINE

## 2025-03-06 RX ORDER — DEXTROMETHORPHAN/PSEUDOEPHED 2.5-7.5/.8
DROPS ORAL
Status: COMPLETED | OUTPATIENT
Start: 2025-03-06 | End: 2025-03-06

## 2025-03-06 RX ORDER — LIDOCAINE HYDROCHLORIDE 20 MG/ML
INJECTION INTRAVENOUS
Status: DISCONTINUED | OUTPATIENT
Start: 2025-03-06 | End: 2025-03-06

## 2025-03-06 RX ORDER — PROPOFOL 10 MG/ML
VIAL (ML) INTRAVENOUS CONTINUOUS PRN
Status: DISCONTINUED | OUTPATIENT
Start: 2025-03-06 | End: 2025-03-06

## 2025-03-06 RX ORDER — SODIUM CHLORIDE 9 MG/ML
INJECTION, SOLUTION INTRAVENOUS CONTINUOUS
Status: DISCONTINUED | OUTPATIENT
Start: 2025-03-06 | End: 2025-03-06 | Stop reason: HOSPADM

## 2025-03-06 RX ORDER — SODIUM CHLORIDE 0.9 % (FLUSH) 0.9 %
10 SYRINGE (ML) INJECTION
Status: DISCONTINUED | OUTPATIENT
Start: 2025-03-06 | End: 2025-03-06 | Stop reason: HOSPADM

## 2025-03-06 RX ORDER — PHENYLEPHRINE HYDROCHLORIDE 10 MG/ML
INJECTION INTRAVENOUS
Status: DISCONTINUED | OUTPATIENT
Start: 2025-03-06 | End: 2025-03-06

## 2025-03-06 RX ADMIN — PROPOFOL 50 MG: 10 INJECTION, EMULSION INTRAVENOUS at 08:03

## 2025-03-06 RX ADMIN — LIDOCAINE HYDROCHLORIDE 50 MG: 20 INJECTION, SOLUTION INTRAVENOUS at 08:03

## 2025-03-06 RX ADMIN — PROPOFOL 150 MCG/KG/MIN: 10 INJECTION, EMULSION INTRAVENOUS at 08:03

## 2025-03-06 RX ADMIN — SODIUM CHLORIDE: 0.9 INJECTION, SOLUTION INTRAVENOUS at 08:03

## 2025-03-06 RX ADMIN — PHENYLEPHRINE HYDROCHLORIDE 150 MCG: 10 INJECTION INTRAVENOUS at 08:03

## 2025-03-06 NOTE — H&P
Short Stay Endoscopy History and Physical    PCP - Caitlyn Fong MD    Procedure - Colonoscopy  ASA - per anesthesia  Mallampati - per anesthesia  History of Anesthesia problems - no  Family history Anesthesia problems - no   Plan of anesthesia - General    HPI:  This is a 69 y.o. male here for evaluation of : personal history of colon polyps      ROS:  Constitutional: No fevers, chills, No weight loss  CV: No chest pain  Pulm: No cough, No shortness of breath  GI: see HPI  Derm: No rash    Medical History:  has a past medical history of Anxiety (11/20/2015), Combined hyperlipidemia (11/23/2012), HEARING LOSS, Major depressive disorder, recurrent, moderate (11/23/2012), Obesity (BMI 30.0-34.9) (11/20/2015), BARAK on CPAP (11/20/2015), Prediabetes (11/23/2016), and Smoker (05/02/2013).    Surgical History:  has a past surgical history that includes Colonoscopy (N/A, 7/12/2019); sleep endoscopy, drug-induced (N/A, 6/13/2023); Nasal septoplasty (N/A, 9/19/2023); Nasal turbinate reduction (Bilateral, 9/19/2023); and Colonoscopy (N/A, 3/5/2024).    Family History: family history includes Heart disease in his mother.. Otherwise no colon cancer, inflammatory bowel disease, or GI malignancies.    Social History:  reports that he has been smoking cigarettes. He has a 0.5 pack-year smoking history. He has never used smokeless tobacco. He reports current alcohol use. He reports that he does not use drugs.    Review of patient's allergies indicates:  No Known Allergies    Medications:   Prescriptions Prior to Admission[1]      Physical Exam:    Vital Signs:   Vitals:    03/06/25 0716   BP: 138/71   Pulse: (!) 55   Resp: 18   Temp: 97.5 °F (36.4 °C)       General Appearance: Well appearing in no acute distress  Eyes:    No scleral icterus  ENT: Neck supple, Lips, mucosa, and tongue normal; teeth and gums normal  Abdomen: Soft, non tender, non distended with positive bowel sounds. No hepatosplenomegaly, ascites, or  mass.  Extremities: 2+ pulses, no clubbing, cyanosis or edema  Skin: No rash      Labs:  Lab Results   Component Value Date    WBC 9.44 04/19/2024    HGB 14.8 04/19/2024    HCT 46.1 04/19/2024     04/19/2024    CHOL 139 10/23/2024    TRIG 210 (H) 10/23/2024    HDL 30 (L) 10/23/2024    ALT 18 10/23/2024    AST 20 10/23/2024     10/23/2024    K 4.0 10/23/2024     10/23/2024    CREATININE 1.0 10/23/2024    BUN 14 10/23/2024    CO2 24 10/23/2024    TSH 2.304 04/19/2024    PSA 3.2 04/19/2024    HGBA1C 5.8 (H) 10/23/2024       I have explained the risks and benefits of endoscopy procedures to the patient including but not limited to bleeding, perforation, infection, and death.  The patient was asked if they understand and allowed to ask any further questions to their satisfaction.    Uli Prado MD         [1]   Medications Prior to Admission   Medication Sig Dispense Refill Last Dose/Taking    aspirin 81 MG Chew Take 1 tablet (81 mg total) by mouth once daily.   3/4/2025    atorvastatin (LIPITOR) 40 MG tablet Take 1 tablet by mouth once daily 90 tablet 3 3/4/2025    ALPRAZolam (XANAX) 0.5 MG tablet Take 1 tablet (0.5 mg total) by mouth every evening. 90 tablet 1 3/4/2025 Bedtime    azelastine (ASTELIN) 137 mcg (0.1 %) nasal spray 1 spray (137 mcg total) by Nasal route 2 (two) times daily. 30 mL 11     buPROPion (WELLBUTRIN XL) 300 MG 24 hr tablet Take 1 tablet by mouth once daily 90 tablet 3 3/4/2025    cholecalciferol, vitamin D3, (VITAMIN D3) 50 mcg (2,000 unit) Cap capsule Take 1 capsule (2,000 Units total) by mouth once daily. 100 capsule 1 3/4/2025    ergocalciferol (ERGOCALCIFEROL) 50,000 unit Cap Take 1 capsule (50,000 Units total) by mouth every 7 days. 12 capsule 4 3/3/2025    fluticasone propionate (FLONASE) 50 mcg/actuation nasal spray 2 sprays (100 mcg total) by Each Nostril route once daily. 48 g 4     metFORMIN (GLUCOPHAGE-XR) 500 MG ER 24hr tablet TAKE 1 TABLET BY MOUTH TWICE DAILY  WITH MEALS 180 tablet 1 3/4/2025    sertraline (ZOLOFT) 100 MG tablet Take 1 tablet (100 mg total) by mouth once daily. 90 tablet 3 3/4/2025    sildenafiL (VIAGRA) 100 MG tablet Take 1 tablet (100 mg total) by mouth daily as needed for Erectile Dysfunction. 9 tablet 11     sod sulf-pot chloride-mag sulf (SUTAB) 1.479-0.188- 0.225 gram tablet Take 12 tablets by mouth once daily. Take according to instructions provided by Endoscopy Nurse. 24 tablet 0     tamsulosin (FLOMAX) 0.4 mg Cap TAKE TWO CAPSULES BY MOUTH DAILY IN THE EVENING. Strength: 0.4 mg 180 capsule 3 3/4/2025

## 2025-03-06 NOTE — TRANSFER OF CARE
"Anesthesia Transfer of Care Note    Patient: Stef Pool    Procedure(s) Performed: Procedure(s) (LRB):  COLONOSCOPY (N/A)    Patient location: [facility]    Anesthesia Type: MAC    Transport from OR: Transported from OR on 6-10 L/min O2 by face mask with adequate spontaneous ventilation    Post pain: adequate analgesia    Post assessment: no apparent anesthetic complications    Post vital signs: stable    Nausea/Vomiting: no nausea/vomiting    Complications: none    Transfer of care protocol was followed      Last vitals: Visit Vitals  /71   Pulse (!) 55   Temp 36.4 °C (97.5 °F)   Resp 18   Ht 5' 10" (1.778 m)   Wt 97.5 kg (215 lb)   SpO2 97%   BMI 30.85 kg/m²     "

## 2025-03-06 NOTE — ANESTHESIA POSTPROCEDURE EVALUATION
Anesthesia Post Evaluation    Patient: Stef Pool    Procedure(s) Performed: Procedure(s) (LRB):  COLONOSCOPY (N/A)    Final Anesthesia Type: general      Patient location during evaluation: PACU  Patient participation: Yes- Able to Participate  Level of consciousness: awake and alert  Post-procedure vital signs: reviewed and stable  Pain management: adequate  Airway patency: patent    PONV status at discharge: No PONV  Anesthetic complications: no      Cardiovascular status: stable  Respiratory status: room air  Hydration status: euvolemic  Follow-up not needed.              Vitals Value Taken Time   /74 03/06/25 09:04   Temp 36 03/06/25 09:22   Pulse 50 03/06/25 09:04   Resp 18 03/06/25 09:04   SpO2 97 % 03/06/25 09:04         Event Time   Out of Recovery 09:14:17         Pain/Jonathan Score: Jonathan Score: 10 (3/6/2025  9:04 AM)

## 2025-03-06 NOTE — ANESTHESIA PREPROCEDURE EVALUATION
[facility]  Anesthesia Pre-Operative Evaluation         Patient Name: Stef Pool  YOB: 1955  MRN: [id number]    SUBJECTIVE:     Pre-operative evaluation for Procedure(s) (LRB):  COLONOSCOPY (N/A)     03/05/2025    Stef Pool is a 69 y.o. male w/ a significant PMHx of BARAK s/p septoplasty and turbinate reduction presents with 1 year screening colonoscopy.     Patient now presents for the above procedure(s).      LDA: None documented.    Prev airway:     Intubation     Date/Time: 9/19/2023 7:25 AM     Performed by: Stella Alejandro CRNA  Authorized by: Raman Park MD    Intubation:     Induction:  Intravenous    Intubated:  Postinduction    Mask Ventilation:  Moderately difficult with oral airway    Attempts:  1    Attempted By:  Student    Method of Intubation:  Video laryngoscopy    Blade:  Burrell 3    Laryngeal View Grade: Grade I - full view of cords      Difficult Airway Encountered?: No      Complications:  None    Airway Device:  Oral endotracheal tube    Airway Device Size:  7.5    Style/Cuff Inflation:  Cuffed (inflated to minimal occlusive pressure)    Inflation Amount (mL):  7    Tube secured:  23    Secured at:  The lips    Placement Verified By:  Capnometry    Complicating Factors:  None    Findings Post-Intubation:  Atraumatic/condition of teeth unchanged and BS equal bilateral          Drips: None documented.      Problem List[1]    Review of patient's allergies indicates:  No Known Allergies    Current Inpatient Medications:      Medications Ordered Prior to Encounter[2]    Past Surgical History:   Procedure Laterality Date    COLONOSCOPY N/A 7/12/2019    Procedure: COLONOSCOPY;  Surgeon: Amrit Reyes MD;  Location: [facility] ENDO;  Service: Endoscopy;  Laterality: N/A;    COLONOSCOPY N/A 3/5/2024    Procedure: COLONOSCOPY;  Surgeon: Uli Prado MD;  Location: [facility] ENDO;  Service: Endoscopy;  Laterality: N/A;    NASAL SEPTOPLASTY N/A 9/19/2023     Procedure: SEPTOPLASTY, NOSE;  Surgeon: Ann Gross MD;  Location: [facility] OR;  Service: ENT;  Laterality: N/A;  RN PREOP 9/12/2023----NEED H/P    NASAL TURBINATE REDUCTION Bilateral 9/19/2023    Procedure: REDUCTION, NASAL TURBINATE;  Surgeon: Ann Gross MD;  Location: [facility] OR;  Service: ENT;  Laterality: Bilateral;    SLEEP ENDOSCOPY, DRUG-INDUCED N/A 6/13/2023    Procedure: SLEEP ENDOSCOPY,DRUG-INDUCED;  Surgeon: Ann Gross MD;  Location: [facility] OR;  Service: ENT;  Laterality: N/A;  RN PREOP 06/09/2023 --JM----NEED H/P       Social History:  Tobacco Use: High Risk (2/17/2025)    Patient History     Smoking Tobacco Use: Every Day     Smokeless Tobacco Use: Never     Passive Exposure: Not on file      Alcohol Use: Unknown (10/30/2024)    AUDIT-C     Frequency of Alcohol Consumption: Monthly or less     Average Number of Drinks: Patient declined     Frequency of Binge Drinking: Less than monthly        OBJECTIVE:     Vital Signs Range (Last 24H):         Significant Labs:  Lab Results   Component Value Date    WBC 9.44 04/19/2024    HGB 14.8 04/19/2024    HCT 46.1 04/19/2024     04/19/2024    CHOL 139 10/23/2024    TRIG 210 (H) 10/23/2024    HDL 30 (L) 10/23/2024    ALT 18 10/23/2024    AST 20 10/23/2024     10/23/2024    K 4.0 10/23/2024     10/23/2024    CREATININE 1.0 10/23/2024    BUN 14 10/23/2024    CO2 24 10/23/2024    TSH 2.304 04/19/2024    PSA 3.2 04/19/2024    HGBA1C 5.8 (H) 10/23/2024       Diagnostic Studies: No relevant studies.    EKG:   Results for orders placed or performed during the hospital encounter of 06/09/23   EKG 12-lead    Collection Time: 06/09/23  9:34 AM    Narrative    Test Reason : Z01.818,    Vent. Rate : 051 BPM     Atrial Rate : 051 BPM     P-R Int : 200 ms          QRS Dur : 100 ms      QT Int : 424 ms       P-R-T Axes : 072 028 086 degrees     QTc Int : 390 ms    Sinus bradycardia  Otherwise normal ECG  No previous ECGs available  Confirmed  "by Yousif Newell MD ([id number]) on 6/9/2023 10:27:50 AM    Referred By:             Confirmed By:Yousif Newell MD       2D ECHO:  TTE:  Results for orders placed or performed during the hospital encounter of 11/29/21   Echo   Result Value Ref Range    BSA 2.16 m2    TDI SEPTAL 0.07 m/s    LV LATERAL E/E' RATIO 6.45 m/s    LV SEPTAL E/E' RATIO 10.14 m/s    LA WIDTH 3.57 cm    AORTIC VALVE CUSP SEPERATION 2.19 cm    TDI LATERAL 0.11 m/s    PV PEAK VELOCITY 1.12 cm/s    LVIDd 5.50 3.5 - 6.0 cm    IVS 1.10 0.6 - 1.1 cm    PW 0.85 0.6 - 1.1 cm    Ao root annulus 3.17 cm    LVIDs 3.40 2.1 - 4.0 cm    FS 38 28 - 44 %    LA Vol 59.01 cm3    LV mass 206.20 g    LA size 3.63 cm    RVDD 2.96 cm    Left Ventricle Relative Wall Thickness 0.31 cm    AV mean gradient 4 mmHg    AV valve area 2.19 cm2    AV Velocity Ratio 0.66     AV index (prosthetic) 0.67     MV mean gradient 0 mmHg    MV valve area p 1/2 method 3.09 cm2    MV valve area by continuity eq 2.15 cm2    E/A ratio 0.99     Mean e' 0.09 m/s    E wave deceleration time 245.85 msec    IVRT 91.34 msec    MV "A" wave duration 14.46 msec    Pulm vein S/D ratio 1.00     LVOT diameter 2.04 cm    LVOT area 3.3 cm2    LVOT peak elfego 0.91 m/s    LVOT peak VTI 20.09 cm    Ao peak elfego 1.37 m/s    Ao VTI 29.95 cm    LVOT stroke volume 65.63 cm3    AV peak gradient 8 mmHg    MV peak gradient 3 mmHg    E/E' ratio 7.89 m/s    MV Peak E Elfego 0.71 m/s    TR Max Elfego 2.11 m/s    MV VTI 30.48 cm    MV stenosis pressure 1/2 time 71.30 ms    MV Peak A Elfego 0.72 m/s    PV Peak S Elfego 0.34 m/s    PV Peak D Elfego 0.34 m/s    LV Systolic Volume 64.64 mL    LV Systolic Volume Index 30.2 mL/m2    LV Diastolic Volume 165.50 mL    LV Diastolic Volume Index 77.34 mL/m2    ROBIN 27.6 mL/m2    LV Mass Index 96 g/m2    RA Major Axis 5.10 cm    Left Atrium Minor Axis 5.49 cm    Left Atrium Major Axis 5.23 cm    Triscuspid Valve Regurgitation Peak Gradient 18 mmHg    ROBIN (MOD) 13.0 mL/m2    LA Vol (MOD) 27.74 cm3    " RA Width 4.00 cm    Right Atrial Pressure (from IVC) 8 mmHg    EF 60 %    TV resting pulmonary artery pressure 26 mmHg    Narrative    · The left ventricle is normal in size with normal systolic function.  · The estimated ejection fraction is 60%.  · Normal left ventricular diastolic function.  · The estimated PA systolic pressure is 26 mmHg.  · With normal right ventricular systolic function.  · Intermediate central venous pressure (8 mmHg).          BERNARDINO:  No results found for this or any previous visit.    ASSESSMENT/PLAN:         Pre-op Assessment    I have reviewed the Patient Summary Reports.     I have reviewed the Nursing Notes. I have reviewed the NPO Status.   I have reviewed the Medications.     Review of Systems  Anesthesia Hx:  No problems with previous Anesthesia               Denies Personal Hx of Anesthesia complications.                    Cardiovascular:  Cardiovascular Normal                                              Pulmonary:  Pulmonary Normal                       Renal/:  Renal/ Normal                 Hepatic/GI:  Hepatic/GI Normal                    Neurological:    Neuromuscular Disease,                                   Psych:  Psychiatric History                  Physical Exam  General: Well nourished, Cooperative, Alert and Oriented    Airway:  Mallampati: III / II  Mouth Opening: Normal  Tongue: Normal  Neck ROM: Normal ROM    Dental:  Intact    Chest/Lungs:  Normal Respiratory Rate    Heart:  Rate: Normal        Anesthesia Plan  Type of Anesthesia, risks & benefits discussed:    Anesthesia Type: Gen Natural Airway, MAC  Intra-op Monitoring Plan: Standard ASA Monitors  Post Op Pain Control Plan: multimodal analgesia and IV/PO Opioids PRN  Induction:  IV  Airway Plan: Direct, Post-Induction  Informed Consent: Informed consent signed with the Patient and all parties understand the risks and agree with anesthesia plan.  All questions answered.   ASA Score: 2  Day of Surgery Review of  History & Physical: H&P Update referred to the surgeon/provider.    Ready For Surgery From Anesthesia Perspective.     .           [1]   Patient Active Problem List  Diagnosis    Carpal tunnel syndrome on both sides    Combined hyperlipidemia    Major depressive disorder, recurrent, moderate    Medication management    Long-term use of aspirin therapy    BARAK on CPAP    Class 1 obesity due to excess calories with serious comorbidity and body mass index (BMI) of 30.0 to 30.9 in adult    Anxiety    Prediabetes    Tobacco abuse counseling    Nuclear sclerosis, bilateral    Vitamin D deficiency    History of colon polyps    Varicose veins of leg with pain, bilateral    Venous insufficiency of lower extremity    Tobacco abuse    [place] cardiac risk >20% in next 10 years    H/O endoscopic sinus surgery   [2]   No current facility-administered medications on file prior to encounter.     Current Outpatient Medications on File Prior to Encounter   Medication Sig Dispense Refill    ALPRAZolam (XANAX) 0.5 MG tablet Take 1 tablet (0.5 mg total) by mouth every evening. 90 tablet 1    aspirin 81 MG Chew Take 1 tablet (81 mg total) by mouth once daily.      atorvastatin (LIPITOR) 40 MG tablet Take 1 tablet by mouth once daily 90 tablet 3    azelastine (ASTELIN) 137 mcg (0.1 %) nasal spray 1 spray (137 mcg total) by Nasal route 2 (two) times daily. 30 mL 11    buPROPion (WELLBUTRIN XL) 300 MG 24 hr tablet Take 1 tablet by mouth once daily 90 tablet 3    cholecalciferol, vitamin D3, (VITAMIN D3) 50 mcg (2,000 unit) Cap capsule Take 1 capsule (2,000 Units total) by mouth once daily. 100 capsule 1    ergocalciferol (ERGOCALCIFEROL) 50,000 unit Cap Take 1 capsule (50,000 Units total) by mouth every 7 days. 12 capsule 4    fluticasone propionate (FLONASE) 50 mcg/actuation nasal spray 2 sprays (100 mcg total) by Each Nostril route once daily. 48 g 4    metFORMIN (GLUCOPHAGE-XR) 500 MG ER 24hr tablet TAKE 1 TABLET BY MOUTH TWICE DAILY  WITH MEALS 180 tablet 1    sertraline (ZOLOFT) 100 MG tablet Take 1 tablet (100 mg total) by mouth once daily. 90 tablet 3    sildenafiL (VIAGRA) 100 MG tablet Take 1 tablet (100 mg total) by mouth daily as needed for Erectile Dysfunction. 9 tablet 11    tamsulosin (FLOMAX) 0.4 mg Cap TAKE TWO CAPSULES BY MOUTH DAILY IN THE EVENING. Strength: 0.4 mg 180 capsule 3

## 2025-03-06 NOTE — PROVATION PATIENT INSTRUCTIONS
Discharge Summary/Instructions after an Endoscopic Procedure  Patient Name: Stef Pool  Patient MRN: [id number]  Patient YOB: 1955 Thursday, March 6, 2025  Uli Prado MD  Dear patient,  As a result of recent federal legislation (The Federal Cures Act), you may   receive lab or pathology results from your procedure in your [facility]   account before your physician is able to contact you. Your physician or   their representative will relay the results to you with their   recommendations at their soonest availability.  Thank you,  Your health is very important to us during the Covid Crisis. Following your   procedure today, you will receive a daily text for 2 weeks asking about   signs or symptoms of Covid 19.  Please respond to this text when you   receive it so we can follow up and keep you as safe as possible.   RESTRICTIONS:  During your procedure today, you received medications for sedation.  These   medications may affect your judgment, balance and coordination.  Therefore,   for 24 hours, you have the following restrictions:   - DO NOT drive a car, operate machinery, make legal/financial decisions,   sign important papers or drink alcohol.    ACTIVITY:  Today: no heavy lifting, straining or running due to procedural   sedation/anesthesia.  The following day: return to full activity including work.  DIET:  Eat and drink normally unless instructed otherwise.     TREATMENT FOR COMMON SIDE EFFECTS:  - Mild abdominal pain, nausea, belching, bloating or excessive gas:  rest,   eat lightly and use a heating pad.  - Sore Throat: treat with throat lozenges and/or gargle with warm salt   water.  - Because air was used during the procedure, expelling large amounts of air   from your rectum or belching is normal.  - If a bowel prep was taken, you may not have a bowel movement for 1-3 days.    This is normal.  SYMPTOMS TO WATCH FOR AND REPORT TO YOUR PHYSICIAN:  1. Abdominal pain or bloating, other than  gas cramps.  2. Chest pain.  3. Back pain.  4. Signs of infection such as: chills or fever occurring within 24 hours   after the procedure.  5. Rectal bleeding, which would show as bright red, maroon, or black stools.   (A tablespoon of blood from the rectum is not serious, especially if   hemorrhoids are present.)  6. Vomiting.  7. Weakness or dizziness.  GO DIRECTLY TO THE NEAREST EMERGENCY ROOM IF YOU HAVE ANY OF THE FOLLOWING:      Difficulty breathing              Chills and/or fever over 101 F   Persistent vomiting and/or vomiting blood   Severe abdominal pain   Severe chest pain   Black, tarry stools   Bleeding- more than one tablespoon   Any other symptom or condition that you feel may need urgent attention  Your doctor recommends these additional instructions:  If any biopsies were taken, your doctors clinic will contact you in 1 to 2   weeks with any results.  - Discharge patient to home.   - Patient has a contact number available for emergencies.  The signs and   symptoms of potential delayed complications were discussed with the   patient.  Return to normal activities tomorrow.  Written discharge   instructions were provided to the patient.   - Resume previous diet.   - Continue present medications.   - Await pathology results.   - Repeat colonoscopy in 2 years for surveillance.   - Avoid NSAIDs (ibuprofen, aleve, naproxen, BC / Goodys, aspirin etc) for 10   days; Aspirin is OK to continue if indicated for cardiovascular   protection.  For questions, problems or results please call your physician - Uli Prado MD.  EMERGENCY PHONE NUMBER: [phone],  LAB RESULTS: [phone]  IF A COMPLICATION OR EMERGENCY SITUATION ARISES AND YOU ARE UNABLE TO REACH   YOUR PHYSICIAN - GO DIRECTLY TO THE EMERGENCY ROOM.  Uli Prado MD  3/6/2025 8:31:59 AM  This report has been verified and signed electronically.  Dear patient,  As a result of recent federal legislation (The Federal Cures Act), you  may   receive lab or pathology results from your procedure in your [facility]   account before your physician is able to contact you. Your physician or   their representative will relay the results to you with their   recommendations at their soonest availability.  Thank you,  PROVATION

## 2025-03-10 LAB
FINAL PATHOLOGIC DIAGNOSIS: NORMAL
GROSS: NORMAL
Lab: NORMAL

## 2025-04-21 ENCOUNTER — LAB VISIT (OUTPATIENT)
Dept: LAB | Facility: HOSPITAL | Age: 70
End: 2025-04-21
Attending: FAMILY MEDICINE
Payer: MEDICARE

## 2025-04-21 DIAGNOSIS — Z79.899 MEDICATION MANAGEMENT: ICD-10-CM

## 2025-04-21 DIAGNOSIS — Z12.5 SCREENING FOR MALIGNANT NEOPLASM OF PROSTATE: ICD-10-CM

## 2025-04-21 DIAGNOSIS — R73.03 PREDIABETES: ICD-10-CM

## 2025-04-21 DIAGNOSIS — E78.2 COMBINED HYPERLIPIDEMIA: ICD-10-CM

## 2025-04-21 LAB
25(OH)D3+25(OH)D2 SERPL-MCNC: 57 NG/ML (ref 30–96)
ABSOLUTE EOSINOPHIL (OHS): 0.36 K/UL
ABSOLUTE MONOCYTE (OHS): 0.57 K/UL (ref 0.3–1)
ABSOLUTE NEUTROPHIL COUNT (OHS): 4.69 K/UL (ref 1.8–7.7)
ALBUMIN SERPL BCP-MCNC: 4 G/DL (ref 3.5–5.2)
ALP SERPL-CCNC: 120 UNIT/L (ref 40–150)
ALT SERPL W/O P-5'-P-CCNC: 18 UNIT/L (ref 10–44)
ANION GAP (OHS): 12 MMOL/L (ref 8–16)
AST SERPL-CCNC: 23 UNIT/L (ref 11–45)
BASOPHILS # BLD AUTO: 0.05 K/UL
BASOPHILS NFR BLD AUTO: 0.7 %
BILIRUB SERPL-MCNC: 0.5 MG/DL (ref 0.1–1)
BUN SERPL-MCNC: 16 MG/DL (ref 8–23)
CALCIUM SERPL-MCNC: 9.6 MG/DL (ref 8.7–10.5)
CHLORIDE SERPL-SCNC: 107 MMOL/L (ref 95–110)
CHOLEST SERPL-MCNC: 137 MG/DL (ref 120–199)
CHOLEST/HDLC SERPL: 4.4 {RATIO} (ref 2–5)
CO2 SERPL-SCNC: 22 MMOL/L (ref 23–29)
CREAT SERPL-MCNC: 1.1 MG/DL (ref 0.5–1.4)
EAG (OHS): 117 MG/DL (ref 68–131)
ERYTHROCYTE [DISTWIDTH] IN BLOOD BY AUTOMATED COUNT: 14.2 % (ref 11.5–14.5)
GFR SERPLBLD CREATININE-BSD FMLA CKD-EPI: >60 ML/MIN/1.73/M2
GLUCOSE SERPL-MCNC: 98 MG/DL (ref 70–110)
HBA1C MFR BLD: 5.7 % (ref 4–5.6)
HCT VFR BLD AUTO: 45.8 % (ref 40–54)
HDLC SERPL-MCNC: 31 MG/DL (ref 40–75)
HDLC SERPL: 22.6 % (ref 20–50)
HGB BLD-MCNC: 14.9 GM/DL (ref 14–18)
IMM GRANULOCYTES # BLD AUTO: 0.03 K/UL (ref 0–0.04)
IMM GRANULOCYTES NFR BLD AUTO: 0.4 % (ref 0–0.5)
LDLC SERPL CALC-MCNC: 79 MG/DL (ref 63–159)
LYMPHOCYTES # BLD AUTO: 1.85 K/UL (ref 1–4.8)
MAGNESIUM SERPL-MCNC: 2.2 MG/DL (ref 1.6–2.6)
MCH RBC QN AUTO: 29.2 PG (ref 27–31)
MCHC RBC AUTO-ENTMCNC: 32.5 G/DL (ref 32–36)
MCV RBC AUTO: 90 FL (ref 82–98)
NONHDLC SERPL-MCNC: 106 MG/DL
NUCLEATED RBC (/100WBC) (OHS): 0 /100 WBC
PLATELET # BLD AUTO: 206 K/UL (ref 150–450)
PMV BLD AUTO: 11.5 FL (ref 9.2–12.9)
POTASSIUM SERPL-SCNC: 4.1 MMOL/L (ref 3.5–5.1)
PROT SERPL-MCNC: 7 GM/DL (ref 6–8.4)
PSA SERPL-MCNC: 10.02 NG/ML
RBC # BLD AUTO: 5.11 M/UL (ref 4.6–6.2)
RELATIVE EOSINOPHIL (OHS): 4.8 %
RELATIVE LYMPHOCYTE (OHS): 24.5 % (ref 18–48)
RELATIVE MONOCYTE (OHS): 7.5 % (ref 4–15)
RELATIVE NEUTROPHIL (OHS): 62.1 % (ref 38–73)
SODIUM SERPL-SCNC: 141 MMOL/L (ref 136–145)
TRIGL SERPL-MCNC: 135 MG/DL (ref 30–150)
TSH SERPL-ACNC: 2.02 UIU/ML (ref 0.4–4)
WBC # BLD AUTO: 7.55 K/UL (ref 3.9–12.7)

## 2025-04-21 PROCEDURE — 84443 ASSAY THYROID STIM HORMONE: CPT

## 2025-04-21 PROCEDURE — 80053 COMPREHEN METABOLIC PANEL: CPT

## 2025-04-21 PROCEDURE — 80061 LIPID PANEL: CPT

## 2025-04-21 PROCEDURE — 85025 COMPLETE CBC W/AUTO DIFF WBC: CPT

## 2025-04-21 PROCEDURE — 83735 ASSAY OF MAGNESIUM: CPT

## 2025-04-21 PROCEDURE — 83036 HEMOGLOBIN GLYCOSYLATED A1C: CPT

## 2025-04-21 PROCEDURE — 82306 VITAMIN D 25 HYDROXY: CPT

## 2025-04-21 PROCEDURE — 84153 ASSAY OF PSA TOTAL: CPT

## 2025-04-21 PROCEDURE — 36415 COLL VENOUS BLD VENIPUNCTURE: CPT

## 2025-04-23 ENCOUNTER — OFFICE VISIT (OUTPATIENT)
Dept: FAMILY MEDICINE | Facility: CLINIC | Age: 70
End: 2025-04-23
Payer: MEDICARE

## 2025-04-23 ENCOUNTER — PATIENT MESSAGE (OUTPATIENT)
Dept: FAMILY MEDICINE | Facility: CLINIC | Age: 70
End: 2025-04-23

## 2025-04-23 VITALS
WEIGHT: 216.06 LBS | DIASTOLIC BLOOD PRESSURE: 60 MMHG | OXYGEN SATURATION: 99 % | BODY MASS INDEX: 30.93 KG/M2 | HEIGHT: 70 IN | SYSTOLIC BLOOD PRESSURE: 130 MMHG | HEART RATE: 64 BPM

## 2025-04-23 DIAGNOSIS — E66.09 CLASS 1 OBESITY DUE TO EXCESS CALORIES WITH SERIOUS COMORBIDITY AND BODY MASS INDEX (BMI) OF 31.0 TO 31.9 IN ADULT: ICD-10-CM

## 2025-04-23 DIAGNOSIS — R97.20 ELEVATED PSA: ICD-10-CM

## 2025-04-23 DIAGNOSIS — R73.03 PREDIABETES: ICD-10-CM

## 2025-04-23 DIAGNOSIS — E66.811 CLASS 1 OBESITY DUE TO EXCESS CALORIES WITH SERIOUS COMORBIDITY AND BODY MASS INDEX (BMI) OF 31.0 TO 31.9 IN ADULT: ICD-10-CM

## 2025-04-23 DIAGNOSIS — E78.2 COMBINED HYPERLIPIDEMIA: ICD-10-CM

## 2025-04-23 DIAGNOSIS — Z86.69 HISTORY OF CATARACT: Primary | ICD-10-CM

## 2025-04-23 DIAGNOSIS — Z87.891 PERSONAL HISTORY OF NICOTINE DEPENDENCE: ICD-10-CM

## 2025-04-23 DIAGNOSIS — F33.1 MAJOR DEPRESSIVE DISORDER, RECURRENT, MODERATE: ICD-10-CM

## 2025-04-23 DIAGNOSIS — Z01.00 ROUTINE EYE EXAM: ICD-10-CM

## 2025-04-23 DIAGNOSIS — F41.9 ANXIETY: ICD-10-CM

## 2025-04-23 DIAGNOSIS — Z00.00 ROUTINE GENERAL MEDICAL EXAMINATION AT A HEALTH CARE FACILITY: Primary | ICD-10-CM

## 2025-04-23 DIAGNOSIS — Z12.2 SCREENING FOR LUNG CANCER: ICD-10-CM

## 2025-04-23 DIAGNOSIS — E55.9 VITAMIN D DEFICIENCY: ICD-10-CM

## 2025-04-23 DIAGNOSIS — R25.1 TREMOR OF BOTH HANDS: ICD-10-CM

## 2025-04-23 PROCEDURE — 99999 PR PBB SHADOW E&M-EST. PATIENT-LVL V: CPT | Mod: PBBFAC,,,

## 2025-04-23 NOTE — PROGRESS NOTES
Subjective     Patient ID: Stef Pool is a 69 y.o. male.    Chief Complaint: Annual Exam      Patient is a 69 year old white male with anxiety/depression, prediabetes, obesity, HLD that presents to clinic alone today as a new patient to me, established with Dr. Fong, for his annual physical exam with review of labs. Fasting labs done 4/21/25. Doing OK overall. Does have concern of new tremor that started in last couple months. Denies any pain, weakness, numbness. Tremors is in both hands and is at rest and when he is doing daily activities. Denies needing any medication refills at this time.     UTD with all health maintenance at this time.  Declines Covid vaccine today.  Never had CT chest done for lung CA screening- will reorder and help schedule.    Needs referral to new eye dr due to insurance changes.      Review of Systems   Constitutional:  Negative for fatigue and unexpected weight change.   HENT:  Negative for ear pain and trouble swallowing.    Eyes:  Negative for visual disturbance.   Respiratory:  Negative for cough and shortness of breath.    Cardiovascular:  Negative for chest pain.   Gastrointestinal:  Negative for abdominal pain and change in bowel habit.   Genitourinary:  Negative for difficulty urinating and dysuria.   Neurological:  Positive for tremors. Negative for dizziness, weakness, numbness and headaches.   Psychiatric/Behavioral:  Negative for confusion.      Past Medical History:   Diagnosis Date    Anxiety 11/20/2015    Combined hyperlipidemia 11/23/2012    HEARING LOSS     Major depressive disorder, recurrent, moderate 11/23/2012    Obesity (BMI 30.0-34.9) 11/20/2015    diet and exercise changes discussed     BARAK on CPAP 11/20/2015    uses at least 4 hours nightly     Prediabetes 11/23/2016    making dietary and exercise changes and staring Metformin 500 mg XR daily.  recheck in 3 months    Smoker 05/02/2013       Past Surgical History:   Procedure Laterality Date     COLONOSCOPY N/A 7/12/2019    Procedure: COLONOSCOPY;  Surgeon: Amrit Reyes MD;  Location: [facility] ENDO;  Service: Endoscopy;  Laterality: N/A;    COLONOSCOPY N/A 3/5/2024    Procedure: COLONOSCOPY;  Surgeon: Uli Prado MD;  Location: [facility] ENDO;  Service: Endoscopy;  Laterality: N/A;    COLONOSCOPY N/A 3/6/2025    Procedure: COLONOSCOPY;  Surgeon: lUi Prado MD;  Location: [facility] ENDO;  Service: Endoscopy;  Laterality: N/A;  sutab, instr to portal. DBM    NASAL SEPTOPLASTY N/A 9/19/2023    Procedure: SEPTOPLASTY, NOSE;  Surgeon: Ann Gross MD;  Location: [facility] OR;  Service: ENT;  Laterality: N/A;  RN PREOP 9/12/2023----NEED H/P    NASAL TURBINATE REDUCTION Bilateral 9/19/2023    Procedure: REDUCTION, NASAL TURBINATE;  Surgeon: Ann Gross MD;  Location: [facility] OR;  Service: ENT;  Laterality: Bilateral;    SLEEP ENDOSCOPY, DRUG-INDUCED N/A 6/13/2023    Procedure: SLEEP ENDOSCOPY,DRUG-INDUCED;  Surgeon: Ann Gross MD;  Location: [facility] OR;  Service: ENT;  Laterality: N/A;  RN PREOP 06/09/2023 --JM----NEED H/P       Family History   Problem Relation Name Age of Onset    Heart disease Mother         Social History     Socioeconomic History    Marital status:    Tobacco Use    Smoking status: Every Day     Current packs/day: 1.00     Average packs/day: 1 pack/day for 45.3 years (45.3 ttl pk-yrs)     Types: Cigarettes     Start date: 1980    Smokeless tobacco: Never    Tobacco comments:     Pt currently in the program   Substance and Sexual Activity    Alcohol use: Yes    Drug use: No    Sexual activity: Yes     Partners: Female     Social Drivers of Health     Financial Resource Strain: Low Risk  (4/20/2025)    Overall Financial Resource Strain (CARDIA)     Difficulty of Paying Living Expenses: Not very hard   Food Insecurity: Food Insecurity Present (4/20/2025)    Hunger Vital Sign     Worried About Running Out of Food in the Last Year: Sometimes true     Ran Out of Food in the  "Last Year: Sometimes true   Transportation Needs: No Transportation Needs (4/20/2025)    PRAPARE - Transportation     Lack of Transportation (Medical): No     Lack of Transportation (Non-Medical): No   Physical Activity: Insufficiently Active (4/20/2025)    Exercise Vital Sign     Days of Exercise per Week: 1 day     Minutes of Exercise per Session: 60 min   Stress: No Stress Concern Present (4/20/2025)    [employer] [place] [employer] - Occupational Stress Questionnaire     Feeling of Stress : Only a little   Housing Stability: Low Risk  (4/20/2025)    Housing Stability Vital Sign     Unable to Pay for Housing in the Last Year: No     Number of Times Moved in the Last Year: 0     Homeless in the Last Year: No       Current Medications[1]    Review of patient's allergies indicates:  No Known Allergies      Objective     Vitals:    04/23/25 0745   BP: 130/60   Pulse: 64   SpO2: 99%   Weight: 98 kg (216 lb 0.8 oz)   Height: 5' 10" (1.778 m)   PainSc: 0-No pain      Physical Exam  Vitals and nursing note reviewed.   Constitutional:       General: He is not in acute distress.     Appearance: Normal appearance. He is obese. He is not ill-appearing.   HENT:      Head: Normocephalic and atraumatic.      Right Ear: Tympanic membrane normal.      Left Ear: Tympanic membrane normal.      Nose: Nose normal.      Mouth/Throat:      Mouth: Mucous membranes are moist.      Pharynx: Oropharynx is clear.   Eyes:      General: No scleral icterus.     Extraocular Movements: Extraocular movements intact.      Conjunctiva/sclera: Conjunctivae normal.   Cardiovascular:      Rate and Rhythm: Normal rate and regular rhythm.      Pulses: Normal pulses.      Heart sounds: Normal heart sounds.   Pulmonary:      Effort: Pulmonary effort is normal. No respiratory distress.      Breath sounds: Normal breath sounds.   Abdominal:      General: Bowel sounds are normal.      Palpations: Abdomen is soft.   Musculoskeletal:         " General: Normal range of motion.      Cervical back: Normal range of motion.      Right lower leg: No edema.      Left lower leg: No edema.   Skin:     General: Skin is warm and dry.      Findings: No rash.   Neurological:      Mental Status: He is alert and oriented to person, place, and time.      GCS: GCS eye subscore is 4. GCS verbal subscore is 5. GCS motor subscore is 6.      Sensory: Sensation is intact.      Motor: Motor function is intact.      Coordination: Coordination is intact.      Gait: Gait is intact.   Psychiatric:         Mood and Affect: Mood normal.         Speech: Speech normal.         Behavior: Behavior normal. Behavior is cooperative.         Cognition and Memory: Cognition normal.            Assessment and Plan     1. Routine general medical examination at a health care facility  Annual physical exam- labs done on 4/21/25; reviewed labs with patient. All questions and concerns answered and addressed.     2. Major depressive disorder, recurrent, moderate  Chronic; stable on current treatment plan; follow up with PCP  Taking Wellbutrin 300 mg and Zoloft 100 mg.     3. Tremor of both hands  - New problem; bilateral hand tremors at rest and with activity. Denies any pain, numbness, weakness. Labs done 4/21/24 WNL. Referral placed to neurology for further evaluation.   -     Ambulatory referral/consult to Neurology; Future; Expected date: 04/30/2025    4. Elevated PSA  - New problem; taking Flomax. New elevated PSA done 4/21/25. Denies any pain, difficulty urinating, hematuria, or discharge. Referral urology placed.   -     Ambulatory referral/consult to Urology; Future; Expected date: 04/30/2025    5. Class 1 obesity due to excess calories with serious comorbidity and body mass index (BMI) of 31.0 to 31.9 in adult  - Recommendation for healthy diet and increasing exercise as tolerated with goal of 150min/week . Recommend weight loss     6. Anxiety  Chronic; stable on current treatment plan;  follow up with PCP  Taking Zoloft, Wellbutrin, and Xanax nightly as needed  .   7. Prediabetes  Chronic; stable on current treatment plan; follow up with PCP  A1C was 5.7 on 4/21/25. Continue dietary changes. Continue taking metformin as prescribed.     8. Vitamin D deficiency  Chronic; stable on current treatment plan; follow up with PCP  Taking Vit D every 7 days as prescribed.     9. Routine eye exam  - Needs referral to new optometrist/ophthalmologist due to insurance changes.  -     Ambulatory referral/consult to Optometry; Future; Expected date: 04/30/2025    10. Screening for lung cancer  11. Personal history of nicotine dependence  -     CT Chest Lung Screening Low Dose; Future; Expected date: 04/23/2025  > 30 year pack history; currently still smoking; declines counseling.  - 0.5 to 1 ppd    12. Combined hyperlipidemia   Chronic; stable on current treatment plan; follow up with PCP  Taking statin. Labs stable and UTD.        Follow up if symptoms worsen or fail to improve.    45 minutes of total time spent on the encounter, which includes face to face time and non-face to face time preparing to see the patient (eg, review of tests), Obtaining and/or reviewing separately obtained history, Documenting clinical information in the electronic or other health record, Independently interpreting results (not separately reported) and communicating results to the patient/family/caregiver, or Care coordination (not separately reported).      Rayne Lauren, MSN, APRN, FNP-C  Family Medicine  Office #[phone]          [1]   Current Outpatient Medications   Medication Sig Dispense Refill    ALPRAZolam (XANAX) 0.5 MG tablet Take 1 tablet (0.5 mg total) by mouth every evening. 90 tablet 1    aspirin 81 MG Chew Take 1 tablet (81 mg total) by mouth once daily.      atorvastatin (LIPITOR) 40 MG tablet Take 1 tablet by mouth once daily 90 tablet 3    azelastine (ASTELIN) 137 mcg (0.1 %) nasal spray 1 spray (137 mcg  total) by Nasal route 2 (two) times daily. 30 mL 11    buPROPion (WELLBUTRIN XL) 300 MG 24 hr tablet Take 1 tablet by mouth once daily 90 tablet 3    cholecalciferol, vitamin D3, (VITAMIN D3) 50 mcg (2,000 unit) Cap capsule Take 1 capsule (2,000 Units total) by mouth once daily. 100 capsule 1    ergocalciferol (ERGOCALCIFEROL) 50,000 unit Cap Take 1 capsule (50,000 Units total) by mouth every 7 days. 12 capsule 4    fluticasone propionate (FLONASE) 50 mcg/actuation nasal spray 2 sprays (100 mcg total) by Each Nostril route once daily. 48 g 4    metFORMIN (GLUCOPHAGE-XR) 500 MG ER 24hr tablet TAKE 1 TABLET BY MOUTH TWICE DAILY WITH MEALS 180 tablet 1    sertraline (ZOLOFT) 100 MG tablet Take 1 tablet (100 mg total) by mouth once daily. 90 tablet 3    sildenafiL (VIAGRA) 100 MG tablet Take 1 tablet (100 mg total) by mouth daily as needed for Erectile Dysfunction. 9 tablet 11    tamsulosin (FLOMAX) 0.4 mg Cap TAKE TWO CAPSULES BY MOUTH DAILY IN THE EVENING. Strength: 0.4 mg 180 capsule 3     No current facility-administered medications for this visit.

## 2025-04-24 ENCOUNTER — TELEPHONE (OUTPATIENT)
Dept: FAMILY MEDICINE | Facility: CLINIC | Age: 70
End: 2025-04-24
Payer: MEDICARE

## 2025-04-24 NOTE — TELEPHONE ENCOUNTER
----- Message from Jackie Dhaliwal MD sent at 4/23/2025  5:26 PM CDT -----  Regarding: FW: establish care  Okay to schedule with me in 6 months to establish care thanks  ----- Message -----  From: Rayne Lauren FNP-C  Sent: 4/23/2025   4:19 PM CDT  To: Jackie Dhaliwal MD  Subject: RE: establish care                               He is usually seen every 6 months or more per chart. He does not require anything sooner than that.  ----- Message -----  From: Jackie Dhaliwal MD  Sent: 4/23/2025   3:39 PM CDT  To: SHARRON Campbell  Subject: RE: establish care                               Yes, I could probably see him but it will not be for another 3-4 months due to some scheduling issues.  If he needs something sooner, he should probably choose a different PCP, thank you  ----- Message -----  From: Rayne Lauren FNP-C  Sent: 4/23/2025   8:44 AM CDT  To: Jackie Dhaliwal MD  Subject: establish care                                   Dr. Dhaliwal, This patient that I saw today wants to establish care with you as Dr. Fong's location is a dilemma for him as he is coming from [place]. I know your panel is full, but I wanted to reach out to you about it first. Let me know if you would accept him as a patient.

## 2025-04-26 ENCOUNTER — RESULTS FOLLOW-UP (OUTPATIENT)
Dept: INTERNAL MEDICINE | Facility: CLINIC | Age: 70
End: 2025-04-26

## 2025-05-02 ENCOUNTER — RESULTS FOLLOW-UP (OUTPATIENT)
Dept: INTERNAL MEDICINE | Facility: CLINIC | Age: 70
End: 2025-05-02
Payer: MEDICARE

## 2025-05-02 ENCOUNTER — TELEPHONE (OUTPATIENT)
Dept: INTERNAL MEDICINE | Facility: CLINIC | Age: 70
End: 2025-05-02
Payer: MEDICARE

## 2025-05-02 DIAGNOSIS — K76.9 LIVER LESION: Primary | ICD-10-CM

## 2025-05-02 DIAGNOSIS — K76.9 LIVER DISEASE, UNSPECIFIED: ICD-10-CM

## 2025-05-06 ENCOUNTER — TELEPHONE (OUTPATIENT)
Dept: FAMILY MEDICINE | Facility: CLINIC | Age: 70
End: 2025-05-06
Payer: MEDICARE

## 2025-05-06 NOTE — TELEPHONE ENCOUNTER
----- Message from SHARRON Campbell sent at 5/6/2025  9:26 AM CDT -----  Regarding: FW: CT lung results  There is a new order for a different CT of his abdomen to further evaluate findings with his liver.   ----- Message -----  From: Caitlyn Fong MD  Sent: 5/2/2025   2:29 PM CDT  To: SHARRON Campbell  Subject: RE: CT lung results                              Hi Rayne- I do think we should get the additional CT liver protocol for evaluation of the hepatic lesions.  I will go ahead and place that order.  Follow-up lung CT in 1 year, Dr. Fong  ----- Message -----  From: Rayne Lauren FNP-C  Sent: 4/29/2025  10:12 AM CDT  To: Caitlyn Fong MD  Subject: CT lung results                                  Dr. Fong,  Mr. Pool did the low dose CT of his chest. Will you review the results and let me know if you think any further imaging is needed at this time?    Thanks! Hope all is well!    LEILA Campbell  ----- Message -----  From: Interface, Rad Results In  Sent: 4/29/2025   8:28 AM CDT  To: SHARRON Campbell

## 2025-05-08 ENCOUNTER — OFFICE VISIT (OUTPATIENT)
Dept: UROLOGY | Facility: CLINIC | Age: 70
End: 2025-05-08
Payer: MEDICARE

## 2025-05-08 VITALS
OXYGEN SATURATION: 97 % | WEIGHT: 222.25 LBS | BODY MASS INDEX: 31.82 KG/M2 | DIASTOLIC BLOOD PRESSURE: 69 MMHG | HEART RATE: 66 BPM | SYSTOLIC BLOOD PRESSURE: 115 MMHG | HEIGHT: 70 IN

## 2025-05-08 DIAGNOSIS — N40.1 BENIGN PROSTATIC HYPERPLASIA WITH WEAK URINARY STREAM: ICD-10-CM

## 2025-05-08 DIAGNOSIS — R39.13 SPLIT URINARY STREAM: ICD-10-CM

## 2025-05-08 DIAGNOSIS — R39.12 WEAK URINE STREAM: ICD-10-CM

## 2025-05-08 DIAGNOSIS — R39.11 URINARY HESITANCY: ICD-10-CM

## 2025-05-08 DIAGNOSIS — R35.1 NOCTURIA: ICD-10-CM

## 2025-05-08 DIAGNOSIS — R39.12 BENIGN PROSTATIC HYPERPLASIA WITH WEAK URINARY STREAM: ICD-10-CM

## 2025-05-08 DIAGNOSIS — R97.20 ELEVATED PSA: Primary | ICD-10-CM

## 2025-05-08 LAB
BILIRUB UR QL STRIP.AUTO: NEGATIVE
CLARITY UR: CLEAR
COLOR UR AUTO: YELLOW
GLUCOSE UR QL STRIP: NEGATIVE
HGB UR QL STRIP: NEGATIVE
KETONES UR QL STRIP: NEGATIVE
LEUKOCYTE ESTERASE UR QL STRIP: NEGATIVE
NITRITE UR QL STRIP: NEGATIVE
PH UR STRIP: 7 [PH]
PROT UR QL STRIP: NEGATIVE
SP GR UR STRIP: 1.02
UROBILINOGEN UR STRIP-ACNC: NEGATIVE EU/DL

## 2025-05-08 PROCEDURE — 99999 PR PBB SHADOW E&M-EST. PATIENT-LVL V: CPT | Mod: PBBFAC,,, | Performed by: NURSE PRACTITIONER

## 2025-05-08 PROCEDURE — 81003 URINALYSIS AUTO W/O SCOPE: CPT | Performed by: NURSE PRACTITIONER

## 2025-05-08 PROCEDURE — 3288F FALL RISK ASSESSMENT DOCD: CPT | Mod: CPTII,S$GLB,, | Performed by: NURSE PRACTITIONER

## 2025-05-08 PROCEDURE — 99204 OFFICE O/P NEW MOD 45 MIN: CPT | Mod: S$GLB,,, | Performed by: NURSE PRACTITIONER

## 2025-05-08 PROCEDURE — 1101F PT FALLS ASSESS-DOCD LE1/YR: CPT | Mod: CPTII,S$GLB,, | Performed by: NURSE PRACTITIONER

## 2025-05-08 PROCEDURE — 87086 URINE CULTURE/COLONY COUNT: CPT | Performed by: NURSE PRACTITIONER

## 2025-05-08 PROCEDURE — 1159F MED LIST DOCD IN RCRD: CPT | Mod: CPTII,S$GLB,, | Performed by: NURSE PRACTITIONER

## 2025-05-08 PROCEDURE — 3008F BODY MASS INDEX DOCD: CPT | Mod: CPTII,S$GLB,, | Performed by: NURSE PRACTITIONER

## 2025-05-08 PROCEDURE — 3074F SYST BP LT 130 MM HG: CPT | Mod: CPTII,S$GLB,, | Performed by: NURSE PRACTITIONER

## 2025-05-08 PROCEDURE — 3078F DIAST BP <80 MM HG: CPT | Mod: CPTII,S$GLB,, | Performed by: NURSE PRACTITIONER

## 2025-05-08 PROCEDURE — 3044F HG A1C LEVEL LT 7.0%: CPT | Mod: CPTII,S$GLB,, | Performed by: NURSE PRACTITIONER

## 2025-05-08 PROCEDURE — 1126F AMNT PAIN NOTED NONE PRSNT: CPT | Mod: CPTII,S$GLB,, | Performed by: NURSE PRACTITIONER

## 2025-05-08 PROCEDURE — 1160F RVW MEDS BY RX/DR IN RCRD: CPT | Mod: CPTII,S$GLB,, | Performed by: NURSE PRACTITIONER

## 2025-05-08 RX ORDER — DOXAZOSIN 8 MG/1
8 TABLET ORAL NIGHTLY
Qty: 30 TABLET | Refills: 11 | Status: SHIPPED | OUTPATIENT
Start: 2025-05-08 | End: 2026-05-08

## 2025-05-08 NOTE — PATIENT INSTRUCTIONS
ANA today  U/A and urine cx today  PSA, total and free today  Discontinue taking tamsulosin (Flomax) 0.8 mg daily at this time.   Start trial of doxazosin (Cardura) 8 mg nightly for BPH.   Follow-up pending urine cx and PSA results.

## 2025-05-08 NOTE — PROGRESS NOTES
Subjective:       Patient ID: Stef Pool is a 69 y.o. male.    Chief Complaint: Elevated PSA    History of Present Illness    CHIEF COMPLAINT:  Patient is new to me. He is a 68 yo WM who presents today for elevated PSA.    PSA HISTORY:  PSA increased from 3.2 one year ago to 10.02 ng/mL on April 21st.    URINARY SYMPTOMS:  He reports long-standing urinary symptoms including weak flow, split stream, hesitancy, and incomplete bladder emptying. He alternates between sitting and standing positions to improve urinary flow. He experiences frequent urination with nocturia once per night. These symptoms have remained unchanged over time.    FAMILY HISTORY:  He denies family history of prostate cancer. His brother underwent a urological procedure for a dilated urinary tract.    CURRENT MEDICATIONS:  He takes Flomax 2 capsules nightly, but not sure if medication is effective.    REVIEW OF SYSTEMS:  He denies blood in urine, testicular pain or swelling, back pain, abdominal pain, fever, chills, nausea, and vomiting.      ROS:  General: -fever, -chills, -fatigue, -weight gain, -weight loss  Eyes: -vision changes, -redness, -discharge  ENT: -ear pain, -nasal congestion, -sore throat  Cardiovascular: -chest pain, -palpitations, -lower extremity edema  Respiratory: -cough, -shortness of breath  Gastrointestinal: -abdominal pain, -nausea, -vomiting, -diarrhea, -constipation, -blood in stool  Genitourinary: -dysuria, -hematuria, +frequency  Musculoskeletal: -joint pain, -muscle pain  Skin: -rash, -lesion  Neurological: -headache, -dizziness, -numbness, -tingling  Psychiatric: -anxiety, -depression, -sleep difficulty  Male Genitourinary: +difficulty urinating, +hesitancy, +incomplete emptying, +nocturia           Objective:      Physical Exam  Vitals and nursing note reviewed.   Constitutional:       General: He is not in acute distress.     Appearance: He is well-developed. He is obese. He is not ill-appearing.   HENT:       Head: Normocephalic and atraumatic.   Eyes:      Pupils: Pupils are equal, round, and reactive to light.   Cardiovascular:      Rate and Rhythm: Normal rate.   Pulmonary:      Effort: Pulmonary effort is normal. No respiratory distress.   Abdominal:      Palpations: Abdomen is soft.      Tenderness: There is no abdominal tenderness.   Genitourinary:     Prostate: Enlarged. Not tender and no nodules present.   Musculoskeletal:         General: Normal range of motion.      Cervical back: Normal range of motion.   Skin:     General: Skin is warm and dry.   Neurological:      Mental Status: He is alert and oriented to person, place, and time.      Coordination: Coordination normal.   Psychiatric:         Mood and Affect: Mood normal.         Behavior: Behavior normal.         Thought Content: Thought content normal.         Judgment: Judgment normal.       Assessment:       Problem List Items Addressed This Visit    None  Visit Diagnoses         Elevated PSA    -  Primary    Relevant Orders    PSA, Total and Free    Urinalysis    Urine Culture High Risk      Benign prostatic hyperplasia with weak urinary stream        Relevant Medications    doxazosin (CARDURA) 8 MG Tab    Other Relevant Orders    PSA, Total and Free    Urinalysis    Urine Culture High Risk      Weak urine stream        Relevant Orders    Urinalysis    Urine Culture High Risk      Split urinary stream        Relevant Orders    Urinalysis    Urine Culture High Risk      Urinary hesitancy        Relevant Orders    Urinalysis    Urine Culture High Risk      Nocturia        Relevant Orders    Urinalysis    Urine Culture High Risk            Plan:           Stef was seen today for elevated psa.    Diagnoses and all orders for this visit:    Elevated PSA  -     PSA, Total and Free; Future  -     Urinalysis  -     Urine Culture High Risk    Benign prostatic hyperplasia with weak urinary stream  -     PSA, Total and Free; Future  -     Urinalysis  -     Urine  Culture High Risk  -     doxazosin (CARDURA) 8 MG Tab; Take 1 tablet (8 mg total) by mouth nightly.    Weak urine stream  -     Urinalysis  -     Urine Culture High Risk    Split urinary stream  -     Urinalysis  -     Urine Culture High Risk    Urinary hesitancy  -     Urinalysis  -     Urine Culture High Risk    Nocturia  -     Urinalysis  -     Urine Culture High Risk    Other orders  ANA today  Discontinue taking tamsulosin (Flomax) 0.8 mg daily at this time.   Start trial of doxazosin (Cardura) 8 mg nightly for BPH.     Follow-up pending urine cx and PSA results.     This note was generated with the assistance of ambient listening technology. Verbal consent was obtained by the patient and accompanying visitor(s) for the recording of patient appointment to facilitate this note. I attest to having reviewed and edited the generated note for accuracy, though some syntax or spelling errors may persist. Please contact the author of this note for any clarification.      Shari Martell, DNP

## 2025-05-09 ENCOUNTER — TELEPHONE (OUTPATIENT)
Dept: UROLOGY | Facility: CLINIC | Age: 70
End: 2025-05-09
Payer: MEDICARE

## 2025-05-09 ENCOUNTER — TELEPHONE (OUTPATIENT)
Dept: FAMILY MEDICINE | Facility: CLINIC | Age: 70
End: 2025-05-09
Payer: MEDICARE

## 2025-05-09 ENCOUNTER — RESULTS FOLLOW-UP (OUTPATIENT)
Dept: UROLOGY | Facility: CLINIC | Age: 70
End: 2025-05-09

## 2025-05-09 ENCOUNTER — PATIENT MESSAGE (OUTPATIENT)
Dept: UROLOGY | Facility: CLINIC | Age: 70
End: 2025-05-09
Payer: MEDICARE

## 2025-05-09 DIAGNOSIS — R97.20 ELEVATED PSA: Primary | ICD-10-CM

## 2025-05-09 DIAGNOSIS — R39.12 BENIGN PROSTATIC HYPERPLASIA WITH WEAK URINARY STREAM: ICD-10-CM

## 2025-05-09 DIAGNOSIS — N40.1 BENIGN PROSTATIC HYPERPLASIA WITH WEAK URINARY STREAM: ICD-10-CM

## 2025-05-09 NOTE — TELEPHONE ENCOUNTER
Called and left VM indicating we had results and provider recommendations ready at the office. Left my name and callback number.

## 2025-05-09 NOTE — TELEPHONE ENCOUNTER
----- Message from Shari Martell DNP sent at 5/9/2025  3:27 PM CDT -----  Please inform patient via telephone that his PSA went down from 10.02 ng/mL to 4.30 ng/mL. This is good. However, PSA is still slightly elevated at 4.30 ng/mL. MRI of prostate to evaluate for   suspicious prostate lesions is recommended at this time. Order placed. Please help patient schedule at [facility] or [facility]. Thanks.   ----- Message -----  From: Lab, Background User  Sent: 5/9/2025  12:30 AM CDT  To: Shari Martell DNP

## 2025-05-09 NOTE — TELEPHONE ENCOUNTER
I gave patient results of PSA and plan of care created by Dr. Martell. I scheduled him for prostate MRI and told him we would reach out with results.

## 2025-05-09 NOTE — TELEPHONE ENCOUNTER
----- Message from Elmo sent at 5/9/2025  3:56 PM CDT -----  .Type:  Needs Medical AdviceWho Called: ptWould the patient rather a call back or a response via [facility]? Call [facility] Call Back Number: [phone] Information: Pt stated he would like a call back regarding a message he received about instructions from a test result

## 2025-05-10 LAB — BACTERIA UR CULT: NO GROWTH

## 2025-05-12 ENCOUNTER — TELEPHONE (OUTPATIENT)
Dept: FAMILY MEDICINE | Facility: CLINIC | Age: 70
End: 2025-05-12
Payer: MEDICARE

## 2025-05-12 ENCOUNTER — TELEPHONE (OUTPATIENT)
Dept: UROLOGY | Facility: CLINIC | Age: 70
End: 2025-05-12
Payer: MEDICARE

## 2025-05-12 NOTE — TELEPHONE ENCOUNTER
----- Message from Shari Martell DNP sent at 5/9/2025  3:27 PM CDT -----  Please inform patient via telephone that his PSA went down from 10.02 ng/mL to 4.30 ng/mL. This is good. However, PSA is still slightly elevated at 4.30 ng/mL. MRI of prostate to evaluate for   suspicious prostate lesions is recommended at this time. Order placed. Please help patient schedule at [facility] or [facility]. Thanks.   ----- Message -----  From: Lab, Background User  Sent: 5/9/2025  12:30 AM CDT  To: Shari Martell DNP

## 2025-05-13 ENCOUNTER — TELEPHONE (OUTPATIENT)
Dept: FAMILY MEDICINE | Facility: CLINIC | Age: 70
End: 2025-05-13
Payer: MEDICARE

## 2025-05-13 NOTE — TELEPHONE ENCOUNTER
----- Message from Macarena sent at 5/9/2025  8:58 AM CDT -----  Caller is requesting to schedule their Lab appointment prior to annual appointment.Name of Caller:pt OSMEL ESPINO [[id number]]Preferred Date and Time of Labs:any time before sept 23 Where would they like the lab performed?[facility] Would the patient rather a call back or a response via [facility]? Call back Best Call Back Number:[phone] Additional Information:pt requesting a call back in regards to requesting labs for comprehensive panel ,lipids panel vitamin b12 and hemo global pt states has blood work done every 6 month and can contact  for further information

## 2025-05-14 ENCOUNTER — TELEPHONE (OUTPATIENT)
Dept: UROLOGY | Facility: CLINIC | Age: 70
End: 2025-05-14
Payer: MEDICARE

## 2025-05-14 NOTE — TELEPHONE ENCOUNTER
----- Message from Shari Martell DNP sent at 5/12/2025  1:35 PM CDT -----  Please inform patient via telephone that his urine cx was normal. He does not have a UTI. Continue to move forward with scheduled prostate MRI. Will f/u with patient after MRI has resulted and   reviewed.   ----- Message -----  From: Lab, Background User  Sent: 5/8/2025   9:16 PM CDT  To: Shari Martell DNP

## 2025-05-14 NOTE — TELEPHONE ENCOUNTER
Left vm letting patient know that urine cx was normal and he doesn't have UTI. Also informed to continue with MRI and we will call after reviewing those results.

## 2025-05-16 ENCOUNTER — RESULTS FOLLOW-UP (OUTPATIENT)
Dept: INTERNAL MEDICINE | Facility: CLINIC | Age: 70
End: 2025-05-16

## 2025-05-16 ENCOUNTER — TELEPHONE (OUTPATIENT)
Dept: INTERNAL MEDICINE | Facility: CLINIC | Age: 70
End: 2025-05-16
Payer: MEDICARE

## 2025-05-16 DIAGNOSIS — K76.9 LIVER DISEASE, UNSPECIFIED: Primary | ICD-10-CM

## 2025-05-16 DIAGNOSIS — R16.0 LIVER MASS: ICD-10-CM

## 2025-05-30 ENCOUNTER — PATIENT MESSAGE (OUTPATIENT)
Dept: FAMILY MEDICINE | Facility: CLINIC | Age: 70
End: 2025-05-30
Payer: MEDICARE

## 2025-06-02 ENCOUNTER — OFFICE VISIT (OUTPATIENT)
Dept: FAMILY MEDICINE | Facility: CLINIC | Age: 70
End: 2025-06-02
Payer: MEDICARE

## 2025-06-02 VITALS
SYSTOLIC BLOOD PRESSURE: 120 MMHG | HEART RATE: 62 BPM | BODY MASS INDEX: 31.78 KG/M2 | HEIGHT: 70 IN | TEMPERATURE: 98 F | WEIGHT: 222 LBS | DIASTOLIC BLOOD PRESSURE: 70 MMHG | OXYGEN SATURATION: 95 %

## 2025-06-02 DIAGNOSIS — F41.9 ANXIETY: ICD-10-CM

## 2025-06-02 DIAGNOSIS — E66.09 CLASS 1 OBESITY DUE TO EXCESS CALORIES WITH SERIOUS COMORBIDITY AND BODY MASS INDEX (BMI) OF 31.0 TO 31.9 IN ADULT: ICD-10-CM

## 2025-06-02 DIAGNOSIS — E66.811 CLASS 1 OBESITY DUE TO EXCESS CALORIES WITH SERIOUS COMORBIDITY AND BODY MASS INDEX (BMI) OF 31.0 TO 31.9 IN ADULT: ICD-10-CM

## 2025-06-02 DIAGNOSIS — Z01.818 PREOP EXAMINATION: Primary | ICD-10-CM

## 2025-06-02 DIAGNOSIS — Z86.69 HISTORY OF CATARACT: ICD-10-CM

## 2025-06-02 DIAGNOSIS — F33.1 MAJOR DEPRESSIVE DISORDER, RECURRENT, MODERATE: ICD-10-CM

## 2025-06-02 DIAGNOSIS — E78.2 COMBINED HYPERLIPIDEMIA: ICD-10-CM

## 2025-06-02 DIAGNOSIS — E55.9 VITAMIN D DEFICIENCY: ICD-10-CM

## 2025-06-02 PROCEDURE — 3288F FALL RISK ASSESSMENT DOCD: CPT | Mod: CPTII,S$GLB,,

## 2025-06-02 PROCEDURE — 1159F MED LIST DOCD IN RCRD: CPT | Mod: CPTII,S$GLB,,

## 2025-06-02 PROCEDURE — 99215 OFFICE O/P EST HI 40 MIN: CPT | Mod: S$GLB,,,

## 2025-06-02 PROCEDURE — 1160F RVW MEDS BY RX/DR IN RCRD: CPT | Mod: CPTII,S$GLB,,

## 2025-06-02 PROCEDURE — 1101F PT FALLS ASSESS-DOCD LE1/YR: CPT | Mod: CPTII,S$GLB,,

## 2025-06-02 PROCEDURE — 1126F AMNT PAIN NOTED NONE PRSNT: CPT | Mod: CPTII,S$GLB,,

## 2025-06-02 PROCEDURE — 3044F HG A1C LEVEL LT 7.0%: CPT | Mod: CPTII,S$GLB,,

## 2025-06-02 PROCEDURE — 3008F BODY MASS INDEX DOCD: CPT | Mod: CPTII,S$GLB,,

## 2025-06-02 PROCEDURE — 3078F DIAST BP <80 MM HG: CPT | Mod: CPTII,S$GLB,,

## 2025-06-02 PROCEDURE — 3074F SYST BP LT 130 MM HG: CPT | Mod: CPTII,S$GLB,,

## 2025-06-02 PROCEDURE — 99999 PR PBB SHADOW E&M-EST. PATIENT-LVL IV: CPT | Mod: PBBFAC,,,

## 2025-06-03 ENCOUNTER — HOSPITAL ENCOUNTER (OUTPATIENT)
Dept: RADIOLOGY | Facility: HOSPITAL | Age: 70
Discharge: HOME | End: 2025-06-03
Attending: NURSE PRACTITIONER
Payer: MEDICARE

## 2025-06-03 DIAGNOSIS — N40.1 BENIGN PROSTATIC HYPERPLASIA WITH WEAK URINARY STREAM: ICD-10-CM

## 2025-06-03 DIAGNOSIS — R39.12 BENIGN PROSTATIC HYPERPLASIA WITH WEAK URINARY STREAM: ICD-10-CM

## 2025-06-03 DIAGNOSIS — R97.20 ELEVATED PSA: ICD-10-CM

## 2025-06-03 PROCEDURE — 72197 MRI PELVIS W/O & W/DYE: CPT | Mod: TC

## 2025-06-03 PROCEDURE — A9585 GADOBUTROL INJECTION: HCPCS | Performed by: NURSE PRACTITIONER

## 2025-06-03 PROCEDURE — 25500020 PHARM REV CODE 255: Performed by: NURSE PRACTITIONER

## 2025-06-03 PROCEDURE — 72197 MRI PELVIS W/O & W/DYE: CPT | Mod: 26,,, | Performed by: RADIOLOGY

## 2025-06-03 RX ORDER — GADOBUTROL 604.72 MG/ML
10 INJECTION INTRAVENOUS
Status: COMPLETED | OUTPATIENT
Start: 2025-06-03 | End: 2025-06-03

## 2025-06-03 RX ADMIN — GADOBUTROL 10 ML: 604.72 INJECTION INTRAVENOUS at 09:06

## 2025-06-05 ENCOUNTER — RESULTS FOLLOW-UP (OUTPATIENT)
Dept: UROLOGY | Facility: CLINIC | Age: 70
End: 2025-06-05

## 2025-06-05 DIAGNOSIS — R97.20 ELEVATED PSA: Primary | ICD-10-CM

## 2025-06-05 DIAGNOSIS — N40.1 BENIGN PROSTATIC HYPERPLASIA WITH WEAK URINARY STREAM: ICD-10-CM

## 2025-06-05 DIAGNOSIS — R39.12 BENIGN PROSTATIC HYPERPLASIA WITH WEAK URINARY STREAM: ICD-10-CM

## 2025-06-06 ENCOUNTER — TELEPHONE (OUTPATIENT)
Dept: UROLOGY | Facility: CLINIC | Age: 70
End: 2025-06-06
Payer: MEDICARE

## 2025-06-12 ENCOUNTER — TELEPHONE (OUTPATIENT)
Dept: UROLOGY | Facility: CLINIC | Age: 70
End: 2025-06-12
Payer: MEDICARE

## 2025-06-20 DIAGNOSIS — R73.03 PREDIABETES: ICD-10-CM

## 2025-06-20 RX ORDER — METFORMIN HYDROCHLORIDE 500 MG/1
500 TABLET, EXTENDED RELEASE ORAL 2 TIMES DAILY WITH MEALS
Qty: 180 TABLET | Refills: 1 | Status: SHIPPED | OUTPATIENT
Start: 2025-06-20

## 2025-06-20 NOTE — TELEPHONE ENCOUNTER
No care due was identified.  Health [facility] Embedded Care Due Messages. Reference number: [id number].   6/20/2025 7:07:18 AM CDT

## 2025-06-20 NOTE — TELEPHONE ENCOUNTER
Refill Decision Note   Stef Pool  is requesting a refill authorization.  Brief Assessment and Rationale for Refill:  Approve     Medication Therapy Plan:         Comments:     Note composed:1:10 PM 06/20/2025

## 2025-07-01 NOTE — PROGRESS NOTES
Name: Stef Pool  MRN: [id number]   CSN: [id number]      Date: 07/02/2025    Referring physician:  Rayne Lauren FNP-C  [address]  [address]  [place] [address]    Chief Complaint: tremor     History of Present Illness (HPI): Stef Pool is a R handed 69 y.o. male with a medical issues significant for CTS, MDD, BARAK, HLD, venous insufficiency, BPH who presents for tremors. Here with spouse. Tremors started a few months ago, only lasted a week or two and then stopped. Constant at that time. Noticed it whenever he was holding something, posture, pouring a gallon of milk. Now he doesn't notice it at all. Both hands, worse in the R hand. No new meds or any other changes at that time. No other symptoms associated with the tremors. Denies imbalance or difficulty with walking. No falls. TSH was checked at that time and it was wnl. No confusion at the time. On metformin for prediabetes. Drinks 3 cups of coffee daily.     Takes alprazolam every evening. Did not run out of this medication at that time.   Rarely drinks etoh.     Worked in an oil refinery.     Family History: none     Neuroleptic Exposure: none     Nonmotor/Premotor ROS:  Anosmia: poor sense of smell   Dysarthria/Hypophonia: none   Dysphagia/Sialorrhea: none   Depression: on wellbutrin and zoloft, stable   Urinary changes: history of BPH -- improving with doxazosin   Constipation: none   Falls: none   Micrographia: none   Sleep issues:  -RBD: acts out dreams and talks in his sleep, fights in his sleep -- rare        Review of Systems:   Review of Systems   Constitutional:  Negative for chills, fever and malaise/fatigue.   HENT:  Negative for hearing loss.    Eyes:  Negative for blurred vision and double vision.   Respiratory:  Negative for cough, shortness of breath and stridor.    Cardiovascular:  Negative for chest pain and leg swelling.   Gastrointestinal:  Negative for constipation, diarrhea and nausea.   Genitourinary:  Negative for  "frequency and urgency.   Musculoskeletal:  Negative for falls.   Skin:  Negative for itching and rash.   Neurological:  Positive for tremors. Negative for dizziness, loss of consciousness and weakness.   Psychiatric/Behavioral:  Negative for hallucinations and memory loss.            Past Medical History: The patient  has a past medical history of Anxiety (11/20/2015), Combined hyperlipidemia (11/23/2012), HEARING LOSS, Major depressive disorder, recurrent, moderate (11/23/2012), Obesity (BMI 30.0-34.9) (11/20/2015), BARAK on CPAP (11/20/2015), Prediabetes (11/23/2016), and Smoker (05/02/2013).    Social History: The patient  reports that he has been smoking cigarettes. He started smoking about 45 years ago. He has a 45.5 pack-year smoking history. He has never used smokeless tobacco. He reports current alcohol use. He reports that he does not use drugs.    Family History: Their family history includes Heart disease in his mother.    Allergies: Patient has no known allergies.     Meds: Medications Ordered Prior to Encounter[1]    Exam:  /62 (BP Location: Left arm, Patient Position: Sitting)   Pulse (!) 55   Ht 5' 10" (1.778 m)   Wt 97.7 kg (215 lb 4.5 oz)   BMI 30.89 kg/m²     Constitutional  Well-developed, well-nourished, appears stated age   Ophthalmoscopic  No papilledema with no hemorrhages or exudates bilaterally   Cardiovascular  Radial pulses 2+ and symmetric, no LE edema bilaterally   Neurological    * Mental status  MOCA =      - Orientation  Oriented to person, place, time, and situation     - Memory   Intact recent and remote     - Attention/concentration  Attentive, vigilant during exam     - Language  Naming & repetition intact, +2-step commands     - Fund of knowledge  Aware of current events     - Executive  Well-organized thoughts     - Other     * Cranial nerves       - CN II  PERRL, visual fields full to confrontation     - CN III, IV, VI  Extraocular movements full, normal pursuits and " saccades     - CN V  Sensation V1 - V3 intact     - CN VII  Face strong and symmetric bilaterally     - CN VIII  Hearing intact bilaterally     - CN IX, X  Palate raises midline and symmetric     - CN XI  SCM and trapezius 5/5 bilaterally     - CN XII  Tongue midline   * Motor  Muscle bulk normal, strength 5/5 throughout   * Sensory   Intact to light touch    * Coordination  No dysmetria with finger-to-nose or heel-to-shin   * Gait  See below.   * Deep tendon reflexes  3+ and symmetric throughout   Agrawal absent    Babinski downgoing bilaterally   * Specialized movement exam  No hypophonic speech.    No facial masking.   No cogwheel rigidity.     No bradykinesia.   Very minimal postural and kinetic tremor, bilaterally    No other dystonia, chorea, athetosis, myoclonus, or tics.   No motor impersistence.   Normal-based gait.   No shortened stride length.   No abnormal arm swing.     No postural instability.      Laboratory/Radiological:  - Results:  Lab Visit on 05/08/2025   Component Date Value Ref Range Status    Creatinine 05/08/2025 0.9  0.5 - 1.4 mg/dL Final    eGFR 05/08/2025 >60  >60 mL/min/1.73/m2 Final    Prostate Specific Antigen 05/08/2025 4.30 (H)  <=4.00 ng/mL Final    Prostate Specific Antigen Free 05/08/2025 1.00  <=1.50 ng/mL Final    PSA % Free 05/08/2025 23.26  Not established % Final   Office Visit on 05/08/2025   Component Date Value Ref Range Status    Color, UA 05/08/2025 Yellow  Straw, Marnie, Yellow, Light-Orange Final    Appearance, UA 05/08/2025 Clear  Clear Final    pH, UA 05/08/2025 7.0  5.0 - 8.0 Final    Spec Grav UA 05/08/2025 1.020  1.005 - 1.030 Final    Protein, UA 05/08/2025 Negative  Negative Final    Glucose, UA 05/08/2025 Negative  Negative Final    Ketones, UA 05/08/2025 Negative  Negative Final    Bilirubin, UA 05/08/2025 Negative  Negative Final    Blood, UA 05/08/2025 Negative  Negative Final    Nitrites, UA 05/08/2025 Negative  Negative Final    Urobilinogen, UA 05/08/2025  Negative  <2.0 EU/dL Final    Leukocyte Esterase, UA 05/08/2025 Negative  Negative Final    Urine Culture 05/08/2025 No Growth   Final   Lab Visit on 04/21/2025   Component Date Value Ref Range Status    Hemoglobin A1c 04/21/2025 5.7 (H)  4.0 - 5.6 % Final    Estimated Average Glucose 04/21/2025 117  68 - 131 mg/dL Final    Sodium 04/21/2025 141  136 - 145 mmol/L Final    Potassium 04/21/2025 4.1  3.5 - 5.1 mmol/L Final    Chloride 04/21/2025 107  95 - 110 mmol/L Final    CO2 04/21/2025 22 (L)  23 - 29 mmol/L Final    Glucose 04/21/2025 98  70 - 110 mg/dL Final    BUN 04/21/2025 16  8 - 23 mg/dL Final    Creatinine 04/21/2025 1.1  0.5 - 1.4 mg/dL Final    Calcium 04/21/2025 9.6  8.7 - 10.5 mg/dL Final    Protein Total 04/21/2025 7.0  6.0 - 8.4 gm/dL Final    Albumin 04/21/2025 4.0  3.5 - 5.2 g/dL Final    Bilirubin Total 04/21/2025 0.5  0.1 - 1.0 mg/dL Final    ALP 04/21/2025 120  40 - 150 unit/L Final    AST 04/21/2025 23  11 - 45 unit/L Final    ALT 04/21/2025 18  10 - 44 unit/L Final    Anion Gap 04/21/2025 12  8 - 16 mmol/L Final    eGFR 04/21/2025 >60  >60 mL/min/1.73/m2 Final    Cholesterol Total 04/21/2025 137  120 - 199 mg/dL Final    Triglyceride 04/21/2025 135  30 - 150 mg/dL Final    HDL Cholesterol 04/21/2025 31 (L)  40 - 75 mg/dL Final    LDL Cholesterol 04/21/2025 79.0  63.0 - 159.0 mg/dL Final    HDL/Cholesterol Ratio 04/21/2025 22.6  20.0 - 50.0 % Final    Cholesterol/HDL Ratio 04/21/2025 4.4  2.0 - 5.0 Final    Non HDL Cholesterol 04/21/2025 106  mg/dL Final    TSH 04/21/2025 2.016  0.400 - 4.000 uIU/mL Final    Vitamin D 04/21/2025 57  30 - 96 ng/mL Final    Magnesium  04/21/2025 2.2  1.6 - 2.6 mg/dL Final    Prostate Specific Antigen 04/21/2025 10.02 (H)  <=4.00 ng/mL Final    WBC 04/21/2025 7.55  3.90 - 12.70 K/uL Final    RBC 04/21/2025 5.11  4.60 - 6.20 M/uL Final    HGB 04/21/2025 14.9  14.0 - 18.0 gm/dL Final    HCT 04/21/2025 45.8  40.0 - 54.0 % Final    MCV 04/21/2025 90  82 - 98 fL Final     MCH 04/21/2025 29.2  27.0 - 31.0 pg Final    MCHC 04/21/2025 32.5  32.0 - 36.0 g/dL Final    RDW 04/21/2025 14.2  11.5 - 14.5 % Final    Platelet Count 04/21/2025 206  150 - 450 K/uL Final    MPV 04/21/2025 11.5  9.2 - 12.9 fL Final    Nucleated RBC 04/21/2025 0  <=0 /100 WBC Final    Neut % 04/21/2025 62.1  38 - 73 % Final    Lymph % 04/21/2025 24.5  18 - 48 % Final    Mono % 04/21/2025 7.5  4 - 15 % Final    Eos % 04/21/2025 4.8  <=8 % Final    Basophil % 04/21/2025 0.7  <=1.9 % Final    Imm Grans % 04/21/2025 0.4  0.0 - 0.5 % Final    Neut # 04/21/2025 4.69  1.8 - 7.7 K/uL Final    Lymph # 04/21/2025 1.85  1 - 4.8 K/uL Final    Mono # 04/21/2025 0.57  0.3 - 1 K/uL Final    Eos # 04/21/2025 0.36  <=0.5 K/uL Final    Baso # 04/21/2025 0.05  <=0.2 K/uL Final    Imm Grans # 04/21/2025 0.03  0.00 - 0.04 K/uL Final       Tsh wnl   B12 wnl in 2024     - Independent review of images:      - Independent review of consultant's notes: Ajit Lauren     ASSESSMENT/PLAN:  Transient tremor episode - resolved  -  Patient reports a brief 1-2 week episode of tremor without recurrence. No associated symptoms (e.g., weakness, sensory changes, fatigue, anxiety).  - No new medications or exposures at the time. Takes alprazolam qhs. No lapse in medication at the onset.   - drinks 3 cups of coffee daily, no increase in caffeine at the time of the tremors   - TSH within normal limits, ruling out a common metabolic cause.  - history of prediabetes, on metformin. Stable.   - No signs on current exam to suggest underlying movement disorder.   - Will monitor for recurrence or progression of symptoms.  - Reassurance provided; no additional workup indicated at this time.  - Patient advised to return if symptoms return, worsen, or if new neurologic symptoms develop. If symptoms reoccur, rec'd taking a video and sending via [employer].   - no parkinsonism on exam today. Will continue to monitor       2. MDD   - stable on sertraline and  wellbutrin  - no known neuroleptic exposure. No new meds started at the onset of tremors.              Follow up: 6 months    This is a patient with a neurologic diagnosis whose overall, ongoing care is being managed and monitored by me and our Neurology clinic.   As such, since 2024,  is the appropriate add-on code to accompany the other E/M billing for this visit.      This visit supports CPT code 73641 based on moderate complexity MDM. The patient presented with a new neurologic symptom (transient tremor) of uncertain etiology. Evaluation included review and interpretation of lab results (TSH, B12, A1c) and review of two external provider notes. No new medications were initiated, but clinical decision-making included risk assessment, discussion of potential causes, reassurance, and return precautions. The complexity of problem evaluation and data review supports a level 4 new patient visit.    Collaborating Physician, Dr. Horton, was available during today's encounter. Any change to plan along with cosign to appear in the EMR.          Brenda Cartwright PA-C   [facility]  Department of Neurology  Movement Disorders             [1]   Current Outpatient Medications on File Prior to Visit   Medication Sig Dispense Refill    ALPRAZolam (XANAX) 0.5 MG tablet Take 1 tablet (0.5 mg total) by mouth every evening. 90 tablet 1    aspirin 81 MG Chew Take 1 tablet (81 mg total) by mouth once daily.      atorvastatin (LIPITOR) 40 MG tablet Take 1 tablet by mouth once daily 90 tablet 3    azelastine (ASTELIN) 137 mcg (0.1 %) nasal spray 1 spray (137 mcg total) by Nasal route 2 (two) times daily. 30 mL 11    buPROPion (WELLBUTRIN XL) 300 MG 24 hr tablet Take 1 tablet by mouth once daily 90 tablet 3    doxazosin (CARDURA) 8 MG Tab Take 1 tablet (8 mg total) by mouth nightly. 30 tablet 11    ergocalciferol (ERGOCALCIFEROL) 50,000 unit Cap Take 1 capsule (50,000 Units total) by mouth every 7 days. 12 capsule 4     fluticasone propionate (FLONASE) 50 mcg/actuation nasal spray 2 sprays (100 mcg total) by Each Nostril route once daily. 48 g 4    metFORMIN (GLUCOPHAGE-XR) 500 MG ER 24hr tablet TAKE 1 TABLET BY MOUTH TWICE DAILY WITH MEALS 180 tablet 1    sertraline (ZOLOFT) 100 MG tablet Take 1 tablet (100 mg total) by mouth once daily. 90 tablet 3    sildenafiL (VIAGRA) 100 MG tablet Take 1 tablet (100 mg total) by mouth daily as needed for Erectile Dysfunction. 9 tablet 11     No current facility-administered medications on file prior to visit.

## 2025-07-02 ENCOUNTER — OFFICE VISIT (OUTPATIENT)
Facility: CLINIC | Age: 70
End: 2025-07-02
Payer: MEDICARE

## 2025-07-02 VITALS
DIASTOLIC BLOOD PRESSURE: 62 MMHG | HEART RATE: 55 BPM | BODY MASS INDEX: 30.82 KG/M2 | HEIGHT: 70 IN | WEIGHT: 215.25 LBS | SYSTOLIC BLOOD PRESSURE: 111 MMHG

## 2025-07-02 DIAGNOSIS — E78.2 COMBINED HYPERLIPIDEMIA: ICD-10-CM

## 2025-07-02 DIAGNOSIS — R73.03 PREDIABETES: ICD-10-CM

## 2025-07-02 DIAGNOSIS — Z71.89 COUNSELING REGARDING GOALS OF CARE: ICD-10-CM

## 2025-07-02 DIAGNOSIS — R25.1 TREMOR OF BOTH HANDS: Primary | ICD-10-CM

## 2025-07-02 DIAGNOSIS — F33.1 MAJOR DEPRESSIVE DISORDER, RECURRENT, MODERATE: ICD-10-CM

## 2025-07-02 DIAGNOSIS — F41.9 ANXIETY: ICD-10-CM

## 2025-07-02 DIAGNOSIS — Z71.6 TOBACCO ABUSE COUNSELING: ICD-10-CM

## 2025-07-02 PROCEDURE — 99999 PR PBB SHADOW E&M-EST. PATIENT-LVL IV: CPT | Mod: PBBFAC,,, | Performed by: PHYSICIAN ASSISTANT

## 2025-07-03 RX ORDER — BUPROPION HYDROCHLORIDE 300 MG/1
300 TABLET ORAL DAILY
Qty: 90 TABLET | Refills: 1 | Status: SHIPPED | OUTPATIENT
Start: 2025-07-03

## 2025-07-03 RX ORDER — METFORMIN HYDROCHLORIDE 500 MG/1
500 TABLET, EXTENDED RELEASE ORAL 2 TIMES DAILY WITH MEALS
Qty: 180 TABLET | Refills: 1 | OUTPATIENT
Start: 2025-07-03

## 2025-07-03 RX ORDER — ATORVASTATIN CALCIUM 40 MG/1
40 TABLET, FILM COATED ORAL DAILY
Qty: 90 TABLET | Refills: 1 | Status: SHIPPED | OUTPATIENT
Start: 2025-07-03

## 2025-07-03 NOTE — TELEPHONE ENCOUNTER
Provider Staff:  Action required for this patient       Please see care gap opportunities below in Care Due Message.    Thanks!  [facility]     Appointments      Date Provider   Last Visit   10/30/2024 Caitlyn Fong MD   Next Visit   Visit date not found Caitlyn Fong MD     Refill Decision Note   Stef Pool  is requesting a refill authorization.  Brief Assessment and Rationale for Refill:  Approve  Quick Discontinue     Medication Therapy Plan:         Comments:     Note composed:3:40 PM 07/03/2025

## 2025-07-03 NOTE — TELEPHONE ENCOUNTER
No care due was identified.  [facility] Embedded Care Due Messages. Reference number: [id number].   7/02/2025 9:53:40 PM CDT

## 2025-07-03 NOTE — TELEPHONE ENCOUNTER
No care due was identified.  Health [facility] Embedded Care Due Messages. Reference number: [id number].   7/02/2025 10:01:47 PM CDT

## 2025-07-05 RX ORDER — ALPRAZOLAM 0.5 MG/1
0.5 TABLET ORAL NIGHTLY
Qty: 90 TABLET | Refills: 0 | Status: SHIPPED | OUTPATIENT
Start: 2025-07-05

## 2025-07-15 NOTE — TELEPHONE ENCOUNTER
Received below message.  Telephoned pt and pt's wife regarding scheduling colonoscopy, both with no answer.  Voicemail messages left with direct contact number for pt to return call.  Portal message also sent.  
Uli Prado MD2 days ago       Reminder set for   Colonoscopy  [place]     1 year from last     Patient is due , can we schedule        
5

## (undated) DEVICE — TUBING SUC UNIV W/CONN 12FT

## (undated) DEVICE — BLANKET LOWER BODY 55.9X40.2IN

## (undated) DEVICE — DRESSING TELFA N ADH 3X8

## (undated) DEVICE — SOL IRR SOD CHL .9% POUR

## (undated) DEVICE — SUPPORT ULNA NERVE PROTECTOR

## (undated) DEVICE — SUT 4-0 CHROMIC GUT / SH

## (undated) DEVICE — SPONGE PATTY SURGICAL .5X3IN

## (undated) DEVICE — SOL NS 1000CC

## (undated) DEVICE — DRAPE STERI INSTRUMENT 1018

## (undated) DEVICE — NDL HYPO REG 25G X 1 1/2

## (undated) DEVICE — TOWEL OR DISP STRL BLUE 4/PK

## (undated) DEVICE — TUBING XPS IRRIG TO STRAIGHTSH

## (undated) DEVICE — SYS LABLNG CORECT MED 4 FLG

## (undated) DEVICE — COVER OVERHEAD SURG LT BLUE

## (undated) DEVICE — PACK HEAD & NECK

## (undated) DEVICE — SEE MEDLINE ITEM 157110

## (undated) DEVICE — SYR 12CC CNTRL L-L NO NDL

## (undated) DEVICE — Device

## (undated) DEVICE — POSITIONER HEAD DONUT 9IN FOAM

## (undated) DEVICE — SYR 10CC LUER LOCK

## (undated) DEVICE — GLOVE SURGICAL LATEX SZ 6.5

## (undated) DEVICE — BLADE INFERIOR TURBINATE 2MM

## (undated) DEVICE — KIT ANTIFOG

## (undated) DEVICE — SYR IRRIGATION BULB STER 60ML

## (undated) DEVICE — NDL HYPO 27G X 1 1/2

## (undated) DEVICE — SEE MEDLINE ITEM 157194

## (undated) DEVICE — CONTAINER SPECIMEN STRL 4OZ

## (undated) DEVICE — SOL NACL IRR 1000ML BTL

## (undated) DEVICE — NDL 18GA X1 1/2 REG BEVEL

## (undated) DEVICE — NASAL AIRWAY SPLINT

## (undated) DEVICE — BLADE OTOLARYNGOLY TRI-CUT 4.0

## (undated) DEVICE — ELECTRODE REM PLYHSV RETURN 9